# Patient Record
Sex: FEMALE | Race: ASIAN | NOT HISPANIC OR LATINO | Employment: FULL TIME | URBAN - METROPOLITAN AREA
[De-identification: names, ages, dates, MRNs, and addresses within clinical notes are randomized per-mention and may not be internally consistent; named-entity substitution may affect disease eponyms.]

---

## 2017-01-06 ENCOUNTER — PRENATAL OFFICE VISIT (OUTPATIENT)
Dept: OBGYN | Facility: CLINIC | Age: 32
End: 2017-01-06
Payer: COMMERCIAL

## 2017-01-06 ENCOUNTER — RADIANT APPOINTMENT (OUTPATIENT)
Dept: ULTRASOUND IMAGING | Facility: CLINIC | Age: 32
End: 2017-01-06
Attending: OBSTETRICS & GYNECOLOGY
Payer: COMMERCIAL

## 2017-01-06 VITALS
BODY MASS INDEX: 33.58 KG/M2 | RESPIRATION RATE: 16 BRPM | SYSTOLIC BLOOD PRESSURE: 101 MMHG | DIASTOLIC BLOOD PRESSURE: 61 MMHG | TEMPERATURE: 98.7 F | WEIGHT: 158 LBS | HEART RATE: 86 BPM

## 2017-01-06 DIAGNOSIS — O09.899 RUBELLA NON-IMMUNE STATUS, ANTEPARTUM: Primary | ICD-10-CM

## 2017-01-06 DIAGNOSIS — Z34.80 SUPERVISION OF OTHER NORMAL PREGNANCY, ANTEPARTUM: ICD-10-CM

## 2017-01-06 DIAGNOSIS — Z28.39 RUBELLA NON-IMMUNE STATUS, ANTEPARTUM: Primary | ICD-10-CM

## 2017-01-06 PROCEDURE — 76805 OB US >/= 14 WKS SNGL FETUS: CPT | Performed by: RADIOLOGY

## 2017-01-06 PROCEDURE — 99207 ZZC PRENATAL VISIT: CPT | Performed by: OBSTETRICS & GYNECOLOGY

## 2017-01-06 NOTE — NURSING NOTE
"Chief Complaint   Patient presents with     Prenatal Care     OBV 21w5d       Initial /61 mmHg  Pulse 86  Temp(Src) 98.7  F (37.1  C) (Oral)  Resp 16  Wt 158 lb (71.668 kg)  LMP 07/28/2016  Breastfeeding? No Estimated body mass index is 33.58 kg/(m^2) as calculated from the following:    Height as of 10/7/16: 4' 9.5\" (1.461 m).    Weight as of this encounter: 158 lb (71.668 kg).  BP completed using cuff size: regular Left arm  Catherinei MIKAL Watson      "

## 2017-01-06 NOTE — PATIENT INSTRUCTIONS
If you have any questions regarding your visit, Please contact your care team.     [facility] Access Services: [phone]    [facility] CLINIC HOURS TELEPHONE NUMBER   DEMOND Fontaine-    Bharti Sparks-TERESITA Rivero-Medical Assistant   Monday-[place]  8:00a.m-4:45 p.m  Wednesday-[place] 8:00a.m-4:45 p.m.  Thursday-[place]  8:00a.m-4:45 p.m.  Friday-[place]  8:00a.m-4:45 p.m. [facility]  [address]  [place], [place] [address] [phone] ask [facility]  [phone] Fax  Imaging [phone]    [facility] Labor and Delivery  [address] [place] [address] Dr.  [place], [place] [address] [phone]    [facility]  [address] [place] [address]  [phone] ask [facility]  [phone] Fax  Imaging [phone]     Urgent Care locations:    [place]        [place] Monday-Friday  5 pm - 9 pm  Saturday and Sunday   9 am - 5 pm    Monday-Friday   11 am - 9 pm  Saturday and Sunday   9 am - 5 pm   [phone]       If you need a medication refill, please contact your pharmacy. Please allow 3 business days for your refill to be completed.  As always, Thank you for trusting us with your healthcare needs!

## 2017-01-06 NOTE — MR AVS SNAPSHOT
After Visit Summary   1/6/2017    Becky Hernandez    MRN: [id number]           Patient Information     Date Of Birth          1985        Visit Information        Provider Department      1/6/2017 2:00 PM Rasheed Matias MD [facility]        Today's Diagnoses     Rubella non-immune status, antepartum    -  1     Supervision of other normal pregnancy, antepartum           Care Instructions                                                        If you have any questions regarding your visit, Please contact your care team.     [facility] Access Services: [phone]    [facility] CLINIC HOURS TELEPHONE NUMBER   DEMOND Fontaine-    Bharti Sparks-RN    Catherinei-Medical Assistant   Monday-[place]  8:00a.m-4:45 p.m  Wednesday-[place] 8:00a.m-4:45 p.m.  Thursday-[place]  8:00a.m-4:45 p.m.  Friday-[place]  8:00a.m-4:45 p.m. [facility]  [address] [place] [address]  [phone] ask for [facility]  [phone] Fax  Imaging [phone]    [facility] Labor and Delivery  [address] [place] [address] [place] [address]  [phone]    [facility]  [address] [facility] [place] [address]  [phone] ask for [facility]  [phone] Fax  Imaging [phone]     Urgent Care locations:    [facility] Monday-Friday  5 pm - 9 pm  Saturday and Sunday   9 am - 5 pm    Monday-Friday   11 am - 9 pm  Saturday and Sunday   9 am - 5 pm   [phone]       If you need a medication refill, please contact your pharmacy. Please allow 3 business days for your refill to be completed.  As always, Thank you for trusting us with your healthcare needs!          Follow-ups after your visit        Who to contact     If you have questions or need follow up information about today's clinic visit or your schedule please contact [place]  [facility] directly at [phone].  Normal or non-critical lab and imaging results will be communicated to you by MyChart, letter or phone within 4 business days after the clinic has received the results. If you do not hear from us within 7 days, please contact the clinic through [employer] or phone. If you have a critical or abnormal lab result, we will notify you by phone as soon as possible.  Submit refill requests through [employer] or call your pharmacy and they will forward the refill request to us. Please allow 3 business days for your refill to be completed.          Additional Information About Your Visit        [employer] Information     [employer] lets you send messages to your doctor, view your test results, renew your prescriptions, schedule appointments and more. To sign up, go to [email or web address] . Click on \"Log in\" on the left side of the screen, which will take you to the Welcome page. Then click on \"Sign up Now\" on the right side of the page.     You will be asked to enter the access code listed below, as well as some personal information. Please follow the directions to create your username and password.     Your access code is: [email or web address]  Expires: 2017  2:10 PM     Your access code will  in 90 days. If you need help or a new code, please call your [place] clinic or [phone].        Care EveryWhere ID     This is your Care EveryWhere ID. This could be used by other organizations to access your [place] medical records  [id number]        Your Vitals Were     Pulse Temperature Respirations Last Period Breastfeeding?       86 98.7  F (37.1  C) (Oral) 16 2016 No        Blood Pressure from Last 3 Encounters:   17 101/61   16 111/72   16 109/74    Weight from Last 3 Encounters:   17 158 lb (71.668 kg)   16 153 lb (69.4 kg)   16 152 lb (68.947 kg)              Today, you had the following     No orders found for display       Primary " Care Provider Office Phone # Fax #    Hazel Mendoza [phone]       [address] [address]        Thank you!     Thank you for choosing [facility]  for your care. Our goal is always to provide you with excellent care. Hearing back from our patients is one way we can continue to improve our services. Please take a few minutes to complete the written survey that you may receive in the mail after your visit with us. Thank you!             Your Updated Medication List - Protect others around you: Learn how to safely use, store and throw away your medicines at www.disposemymeds.org.          This list is accurate as of: 1/6/17  2:10 PM.  Always use your most recent med list.                   Brand Name Dispense Instructions for use    prenatal multivitamin  plus iron 27-0.8 MG Tabs per tablet     90 tablet    Take 1 tablet by mouth daily

## 2017-01-07 NOTE — PROGRESS NOTES
Doing well. No signif signs, symptoms or concerns. Advice as per Anticipatory Guidance/Checklist update. Discussed condition, tests and care plan including RBA. Problem list updated. 1h GTT next.  Rasheed Matias MD

## 2017-02-03 ENCOUNTER — PRENATAL OFFICE VISIT (OUTPATIENT)
Dept: OBGYN | Facility: CLINIC | Age: 32
End: 2017-02-03
Payer: COMMERCIAL

## 2017-02-03 VITALS
WEIGHT: 160 LBS | DIASTOLIC BLOOD PRESSURE: 63 MMHG | BODY MASS INDEX: 34 KG/M2 | HEART RATE: 94 BPM | SYSTOLIC BLOOD PRESSURE: 103 MMHG | TEMPERATURE: 98.2 F | RESPIRATION RATE: 16 BRPM

## 2017-02-03 DIAGNOSIS — Z34.80 SUPERVISION OF OTHER NORMAL PREGNANCY, ANTEPARTUM: Primary | ICD-10-CM

## 2017-02-03 DIAGNOSIS — Z28.39 RUBELLA NON-IMMUNE STATUS, ANTEPARTUM: ICD-10-CM

## 2017-02-03 DIAGNOSIS — O09.899 RUBELLA NON-IMMUNE STATUS, ANTEPARTUM: ICD-10-CM

## 2017-02-03 LAB
GLUCOSE 1H P 50 G GLC PO SERPL-MCNC: 154 MG/DL (ref 60–129)
HGB BLD-MCNC: 11 G/DL (ref 11.7–15.7)

## 2017-02-03 PROCEDURE — 99207 ZZC PRENATAL VISIT: CPT | Performed by: OBSTETRICS & GYNECOLOGY

## 2017-02-03 PROCEDURE — 82950 GLUCOSE TEST: CPT | Performed by: OBSTETRICS & GYNECOLOGY

## 2017-02-03 PROCEDURE — 36415 COLL VENOUS BLD VENIPUNCTURE: CPT | Performed by: OBSTETRICS & GYNECOLOGY

## 2017-02-03 PROCEDURE — 00000218 ZZHCL STATISTIC OBHBG - HEMOGLOBIN: Performed by: OBSTETRICS & GYNECOLOGY

## 2017-02-03 NOTE — NURSING NOTE
"Chief Complaint   Patient presents with     Prenatal Care     OBV 25w5d       Initial /63 mmHg  Pulse 94  Temp(Src) 98.2  F (36.8  C) (Oral)  Resp 16  Wt 160 lb (72.576 kg)  LMP 07/28/2016  Breastfeeding? No Estimated body mass index is 34 kg/(m^2) as calculated from the following:    Height as of 10/7/16: 4' 9.5\" (1.461 m).    Weight as of this encounter: 160 lb (72.576 kg).  BP completed using cuff size: regular Left arm  Catherinei Walter, MIKAL      "

## 2017-02-03 NOTE — MR AVS SNAPSHOT
After Visit Summary   2/3/2017    Becky Hernandez    MRN: [id number]           Patient Information     Date Of Birth          1985        Visit Information        Provider Department      2/3/2017 10:15 AM Rasheed Matias MD [facility]        Today's Diagnoses     Rubella non-immune status, antepartum    -  1     Supervision of other normal pregnancy, antepartum           Care Instructions                                                        If you have any questions regarding your visit, Please contact your care team.     [facility] Access Services: [phone]    [facility] CLINIC HOURS TELEPHONE NUMBER   DEMOND Fontaine-    Bharti Sparks-RN    Catherinei-Medical Assistant   Monday-[place]  8:00a.m-4:45 p.m  Wednesday-[place] 8:00a.m-4:45 p.m.  Thursday-[place]  8:00a.m-4:45 p.m.  Friday-[place]  8:00a.m-4:45 p.m. [facility]  [address] [place] [address]  [phone] ask for [facility]  [phone] Fax  Imaging [phone]    [facility] Labor and Delivery  [address] [place] [address] [place] [address]  [phone]    [facility]  [address] [facility] [place] [address]  [phone] ask for [facility]  [phone] Fax  Imaging [phone]     Urgent Care locations:    [facility] Monday-Friday  5 pm - 9 pm  Saturday and Sunday   9 am - 5 pm    Monday-Friday   11 am - 9 pm  Saturday and Sunday   9 am - 5 pm   [phone]       If you need a medication refill, please contact your pharmacy. Please allow 3 business days for your refill to be completed.  As always, Thank you for trusting us with your healthcare needs!          Follow-ups after your visit        Who to contact     If you have questions or need follow up information about today's clinic visit or your schedule please contact [place]  [facility] directly at [phone].  Normal or non-critical lab and imaging results will be communicated to you by MyChart, letter or phone within 4 business days after the clinic has received the results. If you do not hear from us within 7 days, please contact the clinic through [employer] or phone. If you have a critical or abnormal lab result, we will notify you by phone as soon as possible.  Submit refill requests through [employer] or call your pharmacy and they will forward the refill request to us. Please allow 3 business days for your refill to be completed.          Additional Information About Your Visit        [facility] Information     [employer] lets you send messages to your doctor, view your test results, renew your prescriptions, schedule appointments and more. To sign up, go to [email or web address] . Click on \"Log in\" on the left side of the screen, which will take you to the Welcome page. Then click on \"Sign up Now\" on the right side of the page.     You will be asked to enter the access code listed below, as well as some personal information. Please follow the directions to create your username and password.     Your access code is: [email or web address]  Expires: 2017  2:10 PM     Your access code will  in 90 days. If you need help or a new code, please call your [place] clinic or [phone].        Care EveryWhere ID     This is your Care EveryWhere ID. This could be used by other organizations to access your [place] medical records  [id number]        Your Vitals Were     Pulse Temperature Respirations Last Period Breastfeeding?       94 98.2  F (36.8  C) (Oral) 16 2016 No        Blood Pressure from Last 3 Encounters:   17 103/63   17 101/61   16 111/72    Weight from Last 3 Encounters:   17 160 lb (72.576 kg)   17 158 lb (71.668 kg)   16 153 lb (69.4 kg)              Today, you had the following     No orders found for display       Primary " Care Provider Office Phone # Fax #    Hazel Mendoza [phone]       [address] [address]        Thank you!     Thank you for choosing [facility]  for your care. Our goal is always to provide you with excellent care. Hearing back from our patients is one way we can continue to improve our services. Please take a few minutes to complete the written survey that you may receive in the mail after your visit with us. Thank you!             Your Updated Medication List - Protect others around you: Learn how to safely use, store and throw away your medicines at www.disposemymeds.org.          This list is accurate as of: 2/3/17 10:23 AM.  Always use your most recent med list.                   Brand Name Dispense Instructions for use    prenatal multivitamin  plus iron 27-0.8 MG Tabs per tablet     90 tablet    Take 1 tablet by mouth daily

## 2017-02-03 NOTE — PROGRESS NOTES
Doing well. No signif signs, symptoms or concerns. Advice as per Checklist update. Discussed condition, tests and care plan including RBA. Problem list updated.   Rasheed Matias MD

## 2017-02-18 DIAGNOSIS — Z34.80 SUPERVISION OF OTHER NORMAL PREGNANCY, ANTEPARTUM: ICD-10-CM

## 2017-02-18 PROCEDURE — 82952 GTT-ADDED SAMPLES: CPT | Performed by: OBSTETRICS & GYNECOLOGY

## 2017-02-18 PROCEDURE — 36415 COLL VENOUS BLD VENIPUNCTURE: CPT | Performed by: OBSTETRICS & GYNECOLOGY

## 2017-02-18 PROCEDURE — 82951 GLUCOSE TOLERANCE TEST (GTT): CPT | Performed by: OBSTETRICS & GYNECOLOGY

## 2017-02-20 LAB
GLUCOSE 1H P 100 G GLC PO SERPL-MCNC: 159 MG/DL (ref 60–179)
GLUCOSE 2H P 100 G GLC PO SERPL-MCNC: 145 MG/DL (ref 60–154)
GLUCOSE 3H P 100 G GLC PO SERPL-MCNC: 148 MG/DL (ref 60–139)
GLUCOSE P FAST SERPL-MCNC: 76 MG/DL (ref 60–94)

## 2017-03-01 ENCOUNTER — PRENATAL OFFICE VISIT (OUTPATIENT)
Dept: OBGYN | Facility: CLINIC | Age: 32
End: 2017-03-01
Payer: COMMERCIAL

## 2017-03-01 VITALS
TEMPERATURE: 97.8 F | HEART RATE: 94 BPM | DIASTOLIC BLOOD PRESSURE: 67 MMHG | WEIGHT: 165 LBS | BODY MASS INDEX: 35.09 KG/M2 | SYSTOLIC BLOOD PRESSURE: 106 MMHG

## 2017-03-01 DIAGNOSIS — Z28.39 RUBELLA NON-IMMUNE STATUS, ANTEPARTUM: ICD-10-CM

## 2017-03-01 DIAGNOSIS — Z34.80 SUPERVISION OF OTHER NORMAL PREGNANCY, ANTEPARTUM: ICD-10-CM

## 2017-03-01 DIAGNOSIS — O09.899 RUBELLA NON-IMMUNE STATUS, ANTEPARTUM: ICD-10-CM

## 2017-03-01 PROCEDURE — 99207 ZZC PRENATAL VISIT: CPT | Performed by: OBSTETRICS & GYNECOLOGY

## 2017-03-01 NOTE — NURSING NOTE
"Chief Complaint   Patient presents with     Prenatal Care     OBV 29w3d       Initial /67 (BP Location: Left arm, Patient Position: Chair, Cuff Size: Adult Regular)  Pulse 94  Temp 97.8  F (36.6  C) (Oral)  Wt 165 lb (74.8 kg)  LMP 07/28/2016  Breastfeeding? No  BMI 35.09 kg/m2 Estimated body mass index is 35.09 kg/(m^2) as calculated from the following:    Height as of 10/7/16: 4' 9.5\" (1.461 m).    Weight as of this encounter: 165 lb (74.8 kg).  Medication Reconciliation: complete   Josefa Watson CMA      "

## 2017-03-01 NOTE — MR AVS SNAPSHOT
After Visit Summary   3/1/2017    Becky Hernandez    MRN: [id number]           Patient Information     Date Of Birth          1985        Visit Information        Provider Department      3/1/2017 4:45 PM Rasheed Matias MD [facility]        Today's Diagnoses     Rubella non-immune status, antepartum        Supervision of other normal pregnancy, antepartum          Care Instructions                                                        If you have any questions regarding your visit, Please contact your care team.     [facility] Access Services: [phone]    [facility] CLINIC HOURS TELEPHONE NUMBER   Rasheed Matias M.D.      [place]-    Bharti Sparks-RN    Catherinei-Medical Assistant   Monday-[place]  8:00a.m-4:45 p.m  Wednesday-[place] 8:00a.m-4:45 p.m.  Thursday-[place]  8:00a.m-4:45 p.m.  Friday-[place]  8:00a.m-4:45 p.m. [facility]  [address] [place] [address]  [phone] ask [facility]  [phone] Fax  Imaging [phone]    [facility] Labor and Delivery  [address] [place] [address] [place] [address]  [phone]    [facility]  [address] [place] [address]  [phone] ask [facility]  [phone] Fax  Imaging [phone]     Urgent Care locations:    [facility] Monday-Friday  5 pm - 9 pm  Saturday and Sunday   9 am - 5 pm    Monday-Friday   11 am - 9 pm  Saturday and Sunday   9 am - 5 pm   [phone]       If you need a medication refill, please contact your pharmacy. Please allow 3 business days for your refill to be completed.  As always, Thank you for trusting us with your healthcare needs!          Follow-ups after your visit        Your next 10 appointments already scheduled     Mar 01, 2017  4:45 PM CST   ESTABLISHED PRENATAL with Rasheed Matias MD   [facility]  "Anastasiya Valadez ([facility])    [address] [place] [phone]              Who to contact     If you have questions or need follow up information about today's clinic visit or your schedule please contact [facility] directly at [phone].  Normal or non-critical lab and imaging results will be communicated to you by MyChart, letter or phone within 4 business days after the clinic has received the results. If you do not hear from us within 7 days, please contact the clinic through MyChart or phone. If you have a critical or abnormal lab result, we will notify you by phone as soon as possible.  Submit refill requests through [employer] or call your pharmacy and they will forward the refill request to us. Please allow 3 business days for your refill to be completed.          Additional Information About Your Visit        [employer] Information     [employer] lets you send messages to your doctor, view your test results, renew your prescriptions, schedule appointments and more. To sign up, go to [email or web address] . Click on \"Log in\" on the left side of the screen, which will take you to the Welcome page. Then click on \"Sign up Now\" on the right side of the page.     You will be asked to enter the access code listed below, as well as some personal information. Please follow the directions to create your username and password.     Your access code is: [email or web address]  Expires: 2017  2:10 PM     Your access code will  in 90 days. If you need help or a new code, please call your [facility] or [phone].        Care EveryWhere ID     This is your Care EveryWhere ID. This could be used by other organizations to access your [place] medical records  [id number]        Your Vitals Were     Pulse Temperature Last Period Breastfeeding? BMI (Body Mass Index)       94 97.8  F (36.6  C) (Oral) 2016 No 35.09 kg/m2        Blood Pressure " from Last 3 Encounters:   03/01/17 106/67   02/03/17 103/63   01/06/17 101/61    Weight from Last 3 Encounters:   03/01/17 165 lb (74.8 kg)   02/03/17 160 lb (72.6 kg)   01/06/17 158 lb (71.7 kg)              Today, you had the following     No orders found for display       Primary Care Provider Office Phone # Fax #    Hazel Mendoza [phone]       [address] [address]        Thank you!     Thank you for choosing [facility]  for your care. Our goal is always to provide you with excellent care. Hearing back from our patients is one way we can continue to improve our services. Please take a few minutes to complete the written survey that you may receive in the mail after your visit with us. Thank you!             Your Updated Medication List - Protect others around you: Learn how to safely use, store and throw away your medicines at www.disposemymeds.org.          This list is accurate as of: 3/1/17  4:33 PM.  Always use your most recent med list.                   Brand Name Dispense Instructions for use    prenatal multivitamin  plus iron 27-0.8 MG Tabs per tablet     90 tablet    Take 1 tablet by mouth daily

## 2017-03-01 NOTE — PATIENT INSTRUCTIONS
If you have any questions regarding your visit, Please contact your care team.     [facility] Access Services: [phone]    [facility] CLINIC HOURS TELEPHONE NUMBER   DEMOND Fontaine-    Bharti Sparks-TERESITA Rivero-Medical Assistant   Monday-[place]  8:00a.m-4:45 p.m  Wednesday-[place] 8:00a.m-4:45 p.m.  Thursday-[place]  8:00a.m-4:45 p.m.  Friday-[place]  8:00a.m-4:45 p.m. [facility]  [address]  [place], [place] [address] [phone] ask [facility]  [phone] Fax  Imaging [phone]    [facility] Labor and Delivery  [address] [place] [address] Dr.  [place], [place] [address] [phone]    [facility]  [address] [place] [address]  [phone] ask [facility]  [phone] Fax  Imaging [phone]     Urgent Care locations:    [place]        [place] Monday-Friday  5 pm - 9 pm  Saturday and Sunday   9 am - 5 pm    Monday-Friday   11 am - 9 pm  Saturday and Sunday   9 am - 5 pm   [phone]       If you need a medication refill, please contact your pharmacy. Please allow 3 business days for your refill to be completed.  As always, Thank you for trusting us with your healthcare needs!

## 2017-03-22 ENCOUNTER — PRENATAL OFFICE VISIT (OUTPATIENT)
Dept: OBGYN | Facility: CLINIC | Age: 32
End: 2017-03-22
Payer: COMMERCIAL

## 2017-03-22 VITALS
DIASTOLIC BLOOD PRESSURE: 68 MMHG | TEMPERATURE: 97.8 F | WEIGHT: 165 LBS | SYSTOLIC BLOOD PRESSURE: 100 MMHG | HEART RATE: 100 BPM | BODY MASS INDEX: 35.09 KG/M2

## 2017-03-22 DIAGNOSIS — Z34.80 SUPERVISION OF OTHER NORMAL PREGNANCY, ANTEPARTUM: ICD-10-CM

## 2017-03-22 DIAGNOSIS — O09.899 RUBELLA NON-IMMUNE STATUS, ANTEPARTUM: ICD-10-CM

## 2017-03-22 DIAGNOSIS — Z28.39 RUBELLA NON-IMMUNE STATUS, ANTEPARTUM: ICD-10-CM

## 2017-03-22 PROCEDURE — 99207 ZZC PRENATAL VISIT: CPT | Performed by: OBSTETRICS & GYNECOLOGY

## 2017-03-22 NOTE — PATIENT INSTRUCTIONS
If you have any questions regarding your visit, Please contact your care team.     [facility] Access Services: [phone]    [facility] CLINIC HOURS TELEPHONE NUMBER   DEMOND Fontaine-    Bharti Sparks-TERESITA Rivero-Medical Assistant   Monday-[place]  8:00a.m-4:45 p.m  Wednesday-[place] 8:00a.m-4:45 p.m.  Thursday-[place]  8:00a.m-4:45 p.m.  Friday-[place]  8:00a.m-4:45 p.m. [facility]  [address]  [place], [place] [address] [phone] ask [facility]  [phone] Fax  Imaging [id number]-[phone]    [facility] Labor and Delivery  [address] [place] [address] Dr.  [place], [place] [address] [phone]    [facility]  [address] [place] [address]  [phone] ask [facility]  [phone] Fax  Imaging [phone]     Urgent Care locations:    [place]        [place] Monday-Friday  5 pm - 9 pm  Saturday and Sunday   9 am - 5 pm    Monday-Friday   11 am - 9 pm  Saturday and Sunday   9 am - 5 pm   [phone]       If you need a medication refill, please contact your pharmacy. Please allow 3 business days for your refill to be completed.  As always, Thank you for trusting us with your healthcare needs!

## 2017-03-22 NOTE — NURSING NOTE
"Chief Complaint   Patient presents with     Prenatal Care     OBV 32w3d       Initial /68 (BP Location: Left arm, Patient Position: Chair, Cuff Size: Adult Regular)  Pulse 100  Temp 97.8  F (36.6  C) (Oral)  Wt 165 lb (74.8 kg)  LMP 07/28/2016  Breastfeeding? No  BMI 35.09 kg/m2 Estimated body mass index is 35.09 kg/(m^2) as calculated from the following:    Height as of 10/7/16: 4' 9.5\" (1.461 m).    Weight as of this encounter: 165 lb (74.8 kg).  Medication Reconciliation: complete   Josefa Watson CMA      "

## 2017-03-22 NOTE — MR AVS SNAPSHOT
After Visit Summary   3/22/2017    Becky Hernandez    MRN: [id number]           Patient Information     Date Of Birth          1985        Visit Information        Provider Department      3/22/2017 3:30 PM Rasheed Matias MD [facility]        Today's Diagnoses     Rubella non-immune status, antepartum        Supervision of other normal pregnancy, antepartum          Care Instructions                                                        If you have any questions regarding your visit, Please contact your care team.     [facility] Access Services: [phone]    [facility] CLINIC HOURS TELEPHONE NUMBER   Rasheed Matias M.D.      Terese-    Bharti Sparks-TERESITA Alexanderi-Medical Assistant   Monday-[place]  8:00a.m-4:45 p.m  Wednesday-[place] 8:00a.m-4:45 p.m.  Thursday-[place]  8:00a.m-4:45 p.m.  Friday-[place]  8:00a.m-4:45 p.m. [facility]  [address] [place], [place] [address] [address]  [phone] ask for [facility]  [phone] Fax  Imaging [phone]    [facility] Labor and Delivery  [address] [place] [address] [place] [address]  [phone]    [facility]  [address] [place] [address]  [phone] ask for [facility]  [phone] Fax  Imaging [phone]     Urgent Care locations:    [facility] Monday-Friday  5 pm - 9 pm  Saturday and Sunday   9 am - 5 pm    Monday-Friday   11 am - 9 pm  Saturday and Sunday   9 am - 5 pm   [phone]       If you need a medication refill, please contact your pharmacy. Please allow 3 business days for your refill to be completed.  As always, Thank you for trusting us with your healthcare needs!          Follow-ups after your visit        Who to contact     If you have questions or need follow up information about today's clinic visit or your schedule please contact [facility]  "[facility] directly at [phone].  Normal or non-critical lab and imaging results will be communicated to you by Absolute [employer], letter or phone within 4 business days after the clinic has received the results. If you do not hear from us within 7 days, please contact the clinic through [employer] or phone. If you have a critical or abnormal lab result, we will notify you by phone as soon as possible.  Submit refill requests through Wize or call your pharmacy and they will forward the refill request to us. Please allow 3 business days for your refill to be completed.          Additional Information About Your Visit        [employer] Information     Wize lets you send messages to your doctor, view your test results, renew your prescriptions, schedule appointments and more. To sign up, go to [email or web address] . Click on \"Log in\" on the left side of the screen, which will take you to the Welcome page. Then click on \"Sign up Now\" on the right side of the page.     You will be asked to enter the access code listed below, as well as some personal information. Please follow the directions to create your username and password.     Your access code is: [email or web address]  Expires: 2017  3:10 PM     Your access code will  in 90 days. If you need help or a new code, please call your [place] clinic or [phone].        Care EveryWhere ID     This is your Care EveryWhere ID. This could be used by other organizations to access your [place] medical records  [id number]        Your Vitals Were     Pulse Temperature Last Period Breastfeeding? BMI (Body Mass Index)       100 97.8  F (36.6  C) (Oral) 2016 No 35.09 kg/m2        Blood Pressure from Last 3 Encounters:   17 100/68   17 106/67   17 103/63    Weight from Last 3 Encounters:   17 165 lb (74.8 kg)   17 165 lb (74.8 kg)   17 160 lb (72.6 kg)              Today, you had the following     No orders found for display       " Primary Care Provider Office Phone # Fax #    Hazel Mendoza [phone]       [address]  [facility] [address]        Thank you!     Thank you for choosing [facility]  for your care. Our goal is always to provide you with excellent care. Hearing back from our patients is one way we can continue to improve our services. Please take a few minutes to complete the written survey that you may receive in the mail after your visit with us. Thank you!             Your Updated Medication List - Protect others around you: Learn how to safely use, store and throw away your medicines at www.disposemymeds.org.          This list is accurate as of: 3/22/17  3:48 PM.  Always use your most recent med list.                   Brand Name Dispense Instructions for use    prenatal multivitamin  plus iron 27-0.8 MG Tabs per tablet     90 tablet    Take 1 tablet by mouth daily

## 2017-03-23 NOTE — PROGRESS NOTES
Doing ok. No signif signs, symptoms or concerns except mild URI. Tdap declined. Advice as per Checklist update. Discussed condition, tests and care plan including RBA. Problem list updated.   Rasheed Matias MD

## 2017-04-06 ENCOUNTER — PRENATAL OFFICE VISIT (OUTPATIENT)
Dept: OBGYN | Facility: CLINIC | Age: 32
End: 2017-04-06
Payer: COMMERCIAL

## 2017-04-06 VITALS
WEIGHT: 168 LBS | DIASTOLIC BLOOD PRESSURE: 68 MMHG | TEMPERATURE: 98.6 F | HEART RATE: 101 BPM | BODY MASS INDEX: 35.73 KG/M2 | SYSTOLIC BLOOD PRESSURE: 108 MMHG

## 2017-04-06 DIAGNOSIS — Z28.39 RUBELLA NON-IMMUNE STATUS, ANTEPARTUM: ICD-10-CM

## 2017-04-06 DIAGNOSIS — Z34.81 ENCOUNTER FOR SUPERVISION OF OTHER NORMAL PREGNANCY IN FIRST TRIMESTER: ICD-10-CM

## 2017-04-06 DIAGNOSIS — O09.899 RUBELLA NON-IMMUNE STATUS, ANTEPARTUM: ICD-10-CM

## 2017-04-06 DIAGNOSIS — Z34.80 SUPERVISION OF OTHER NORMAL PREGNANCY, ANTEPARTUM: ICD-10-CM

## 2017-04-06 PROCEDURE — 99207 ZZC PRENATAL VISIT: CPT | Performed by: OBSTETRICS & GYNECOLOGY

## 2017-04-06 RX ORDER — PRENATAL VIT/IRON FUM/FOLIC AC 27MG-0.8MG
1 TABLET ORAL DAILY
Qty: 90 TABLET | Refills: 3 | Status: SHIPPED | OUTPATIENT
Start: 2017-04-06 | End: 2018-11-30

## 2017-04-06 NOTE — PATIENT INSTRUCTIONS
If you have any questions regarding your visit, Please contact your care team.     [facility] Access Services: [phone]    [facility] CLINIC HOURS TELEPHONE NUMBER   DEMOND Fontaine-    Bharti Sparks-TERESITA Rivero-Medical Assistant   Monday-[place]  8:00a.m-4:45 p.m  Wednesday-[place] 8:00a.m-4:45 p.m.  Thursday-[place]  8:00a.m-4:45 p.m.  Friday-[place]  8:00a.m-4:45 p.m. [facility]  [address]  [place], [place] [address] [phone] ask [facility]  [phone] Fax  Imaging [id number]-[phone]    [facility] Labor and Delivery  [address] [place] [address] Dr.  [place], [place] [address] [phone]    [facility]  [address] [place] [address]  [phone] ask [facility]  [phone] Fax  Imaging [phone]     Urgent Care locations:    [place]        [place] Monday-Friday  5 pm - 9 pm  Saturday and Sunday   9 am - 5 pm    Monday-Friday   11 am - 9 pm  Saturday and Sunday   9 am - 5 pm   [phone]       If you need a medication refill, please contact your pharmacy. Please allow 3 business days for your refill to be completed.  As always, Thank you for trusting us with your healthcare needs!

## 2017-04-06 NOTE — NURSING NOTE
"Chief Complaint   Patient presents with     Prenatal Care     OBV 34w4d       Initial /68 (BP Location: Left arm, Patient Position: Chair, Cuff Size: Adult Regular)  Pulse 101  Temp 98.6  F (37  C) (Oral)  Wt 168 lb (76.2 kg)  LMP 07/28/2016  Breastfeeding? No  BMI 35.73 kg/m2 Estimated body mass index is 35.73 kg/(m^2) as calculated from the following:    Height as of 10/7/16: 4' 9.5\" (1.461 m).    Weight as of this encounter: 168 lb (76.2 kg).  Medication Reconciliation: complete   Josefa Watson CMA      "

## 2017-04-06 NOTE — MR AVS SNAPSHOT
After Visit Summary   4/6/2017    Becky Hernandez    MRN: [id number]           Patient Information     Date Of Birth          1985        Visit Information        Provider Department      4/6/2017 3:00 PM Rasheed Matias MD [facility]        Today's Diagnoses     Rubella non-immune status, antepartum        Supervision of other normal pregnancy, antepartum          Care Instructions                                                        If you have any questions regarding your visit, Please contact your care team.     [facility] Access Services: [phone]    [facility] CLINIC HOURS TELEPHONE NUMBER   Rasheed Matias M.D.      Terese-    Bharti Sparks-TERESITA Alexanderi-Medical Assistant   Monday-[place]  8:00a.m-4:45 p.m  Wednesday-[place] 8:00a.m-4:45 p.m.  Thursday-[place]  8:00a.m-4:45 p.m.  Friday-[place]  8:00a.m-4:45 p.m. [facility]  [address] [place] [address] [address]  [phone] ask for [facility]  [phone] Fax  Imaging [phone]    [facility] Labor and Delivery  [address] [place] [address] [place] [address]  [phone]    [facility]  [address] [place] [address]  [phone] ask for [facility]  [phone] Fax  Imaging [phone]     Urgent Care locations:    [facility] Monday-Friday  5 pm - 9 pm  Saturday and Sunday   9 am - 5 pm    Monday-Friday   11 am - 9 pm  Saturday and Sunday   9 am - 5 pm   [phone]       If you need a medication refill, please contact your pharmacy. Please allow 3 business days for your refill to be completed.  As always, Thank you for trusting us with your healthcare needs!          Follow-ups after your visit        Who to contact     If you have questions or need follow up information about today's clinic visit or your schedule please contact [facility]  "IG[facility]ICOLE directly at [phone].  Normal or non-critical lab and imaging results will be communicated to you by BugSensehart, letter or phone within 4 business days after the clinic has received the results. If you do not hear from us within 7 days, please contact the clinic through BugSensehart or phone. If you have a critical or abnormal lab result, we will notify you by phone as soon as possible.  Submit refill requests through Ongage or call your pharmacy and they will forward the refill request to us. Please allow 3 business days for your refill to be completed.          Additional Information About Your Visit        BugSenseharContix Information     Ongage lets you send messages to your doctor, view your test results, renew your prescriptions, schedule appointments and more. To sign up, go to [email or web address] . Click on \"Log in\" on the left side of the screen, which will take you to the Welcome page. Then click on \"Sign up Now\" on the right side of the page.     You will be asked to enter the access code listed below, as well as some personal information. Please follow the directions to create your username and password.     Your access code is: [email or web address]  Expires: 2017  3:23 PM     Your access code will  in 90 days. If you need help or a new code, please call your [place] clinic or [phone].        Care EveryWhere ID     This is your Care EveryWhere ID. This could be used by other organizations to access your [place] medical records  [id number]        Your Vitals Were     Pulse Temperature Last Period Breastfeeding? BMI (Body Mass Index)       101 98.6  F (37  C) (Oral) 2016 No 35.73 kg/m2        Blood Pressure from Last 3 Encounters:   17 108/68   17 100/68   17 106/67    Weight from Last 3 Encounters:   17 168 lb (76.2 kg)   17 165 lb (74.8 kg)   17 165 lb (74.8 kg)              Today, you had the following     No orders found for display       " Primary Care Provider Office Phone # Fax #    Hazel Mendoza [phone]       [address]  [facility] [address]        Thank you!     Thank you for choosing [facility]  for your care. Our goal is always to provide you with excellent care. Hearing back from our patients is one way we can continue to improve our services. Please take a few minutes to complete the written survey that you may receive in the mail after your visit with us. Thank you!             Your Updated Medication List - Protect others around you: Learn how to safely use, store and throw away your medicines at www.disposemymeds.org.          This list is accurate as of: 4/6/17  3:23 PM.  Always use your most recent med list.                   Brand Name Dispense Instructions for use    prenatal multivitamin  plus iron 27-0.8 MG Tabs per tablet     90 tablet    Take 1 tablet by mouth daily

## 2017-04-07 NOTE — PROGRESS NOTES
Doing ok. No signif signs, symptoms or concerns except pelvic pressure and some discharge suggesting yeast- Monistat prn. Advice appropriate to gestational age reviewed including pertinent Checklist items. Discussed condition, tests and care plan including RBA. Problem list updated. GBS next.  Rasheed Matias MD

## 2017-04-20 ENCOUNTER — PRENATAL OFFICE VISIT (OUTPATIENT)
Dept: OBGYN | Facility: CLINIC | Age: 32
End: 2017-04-20
Payer: COMMERCIAL

## 2017-04-20 VITALS
TEMPERATURE: 98 F | DIASTOLIC BLOOD PRESSURE: 72 MMHG | WEIGHT: 171 LBS | HEART RATE: 90 BPM | SYSTOLIC BLOOD PRESSURE: 107 MMHG | BODY MASS INDEX: 36.36 KG/M2

## 2017-04-20 DIAGNOSIS — Z28.39 RUBELLA NON-IMMUNE STATUS, ANTEPARTUM: ICD-10-CM

## 2017-04-20 DIAGNOSIS — O09.899 RUBELLA NON-IMMUNE STATUS, ANTEPARTUM: ICD-10-CM

## 2017-04-20 DIAGNOSIS — Z34.80 SUPERVISION OF OTHER NORMAL PREGNANCY, ANTEPARTUM: Primary | ICD-10-CM

## 2017-04-20 PROCEDURE — 99207 ZZC PRENATAL VISIT: CPT | Performed by: OBSTETRICS & GYNECOLOGY

## 2017-04-20 PROCEDURE — 87653 STREP B DNA AMP PROBE: CPT | Performed by: OBSTETRICS & GYNECOLOGY

## 2017-04-20 NOTE — PATIENT INSTRUCTIONS
If you have any questions regarding your visit, Please contact your care team.     [facility] Access Services: [phone]    [facility] CLINIC HOURS TELEPHONE NUMBER   DEMOND Fontaine-    Bharti Sparks-TERESITA Rivero-Medical Assistant   Monday-[place]  8:00a.m-4:45 p.m  Wednesday-[place] 8:00a.m-4:45 p.m.  Thursday-[place]  8:00a.m-4:45 p.m.  Friday-[place]  8:00a.m-4:45 p.m. [facility]  [address]  [place], [place] [address] [phone] ask [facility]  [phone] Fax  Imaging [phone]    [facility] Labor and Delivery  [address] [place] [address] Dr.  [place], [place] [address] [phone]    [facility]  [address] [place] [address]  [phone] ask [facility]  [phone] Fax  Imaging [phone]     Urgent Care locations:    [place]        [place] Monday-Friday  5 pm - 9 pm  Saturday and Sunday   9 am - 5 pm    Monday-Friday   11 am - 9 pm  Saturday and Sunday   9 am - 5 pm   [phone]       If you need a medication refill, please contact your pharmacy. Please allow 3 business days for your refill to be completed.  As always, Thank you for trusting us with your healthcare needs!

## 2017-04-20 NOTE — MR AVS SNAPSHOT
After Visit Summary   4/20/2017    Becky Hernandez    MRN: [id number]           Patient Information     Date Of Birth          1985        Visit Information        Provider Department      4/20/2017 4:00 PM Rasheed Matias MD [facility]        Today's Diagnoses     Rubella non-immune status, antepartum        Supervision of other normal pregnancy, antepartum          Care Instructions                                                        If you have any questions regarding your visit, Please contact your care team.     [facility] Access Services: [phone]    [facility] CLINIC HOURS TELEPHONE NUMBER   Rasheed Matias M.D.      Terese-    Bharti Sparks-RN    Catherinei-Medical Assistant   Monday-[place]  8:00a.m-4:45 p.m  Wednesday-[place] 8:00a.m-4:45 p.m.  Thursday-[place]  8:00a.m-4:45 p.m.  Friday-[place]  8:00a.m-4:45 p.m. [facility]  [address] [place] [address]  [phone] ask [facility]  [phone] Fax  Imaging [id number]-[phone]    [facility] Labor and Delivery  [address] [place] [address] [place] [address]  [phone]    [facility]  [address] [place] [address]  [phone] ask [facility]  [phone] Fax  Imaging [phone]     Urgent Care locations:    [facility] Monday-Friday  5 pm - 9 pm  Saturday and Sunday   9 am - 5 pm    Monday-Friday   11 am - 9 pm  Saturday and Sunday   9 am - 5 pm   [phone]       If you need a medication refill, please contact your pharmacy. Please allow 3 business days for your refill to be completed.  As always, Thank you for trusting us with your healthcare needs!          Follow-ups after your visit        Your next 10 appointments already scheduled     Apr 26, 2017  4:00 PM CDT   ESTABLISHED PRENATAL with Rasheed Matias MD   [facility]  "[place] [facility]    [address] [facility] [address]   [phone]            May 03, 2017  3:30 PM CDT   ESTABLISHED PRENATAL with Rasheed Matias MD   [facility]    [address] [facility] [address]   [phone]            May 10, 2017  4:30 PM CDT   ESTABLISHED PRENATAL with Rasheed Matias MD   [facility]    [address] [facility] [address]   [phone]              Who to contact     If you have questions or need follow up information about today's clinic visit or your schedule please contact [facility] directly at [phone].  Normal or non-critical lab and imaging results will be communicated to you by MyChart, letter or phone within 4 business days after the clinic has received the results. If you do not hear from us within 7 days, please contact the clinic through [employer] or phone. If you have a critical or abnormal lab result, we will notify you by phone as soon as possible.  Submit refill requests through [employer] or call your pharmacy and they will forward the refill request to us. Please allow 3 business days for your refill to be completed.          Additional Information About Your Visit        [employer] Information     [employer] lets you send messages to your doctor, view your test results, renew your prescriptions, schedule appointments and more. To sign up, go to [email or web address] . Click on \"Log in\" on the left side of the screen, which will take you to the Welcome page. Then click on \"Sign up Now\" on the right side of the page.     You will be asked to enter the access code listed below, as well as some personal information. Please follow the directions to create your username and password.     Your access code is: [email or web address]  Expires: 7/5/2017  3:23 PM     Your access " code will  in 90 days. If you need help or a new code, please call your [place] clinic or [phone].        Care EveryWhere ID     This is your Care EveryWhere ID. This could be used by other organizations to access your [place] medical records  [id number]        Your Vitals Were     Pulse Temperature Last Period Breastfeeding? BMI (Body Mass Index)       90 98  F (36.7  C) (Oral) 2016 No 36.36 kg/m2        Blood Pressure from Last 3 Encounters:   17 107/72   17 108/68   17 100/68    Weight from Last 3 Encounters:   17 171 lb (77.6 kg)   17 168 lb (76.2 kg)   17 165 lb (74.8 kg)              Today, you had the following     No orders found for display       Primary Care Provider Office Phone # Fax #    Hazel Mendoza [phone]       [address] [address]        Thank you!     Thank you for choosing [facility]  for your care. Our goal is always to provide you with excellent care. Hearing back from our patients is one way we can continue to improve our services. Please take a few minutes to complete the written survey that you may receive in the mail after your visit with us. Thank you!             Your Updated Medication List - Protect others around you: Learn how to safely use, store and throw away your medicines at www.disposemymeds.org.          This list is accurate as of: 17  4:00 PM.  Always use your most recent med list.                   Brand Name Dispense Instructions for use    prenatal multivitamin  plus iron 27-0.8 MG Tabs per tablet     90 tablet    Take 1 tablet by mouth daily

## 2017-04-20 NOTE — NURSING NOTE
"Chief Complaint   Patient presents with     Prenatal Care     OBV 36w4d       Initial /72 (BP Location: Left arm, Patient Position: Chair, Cuff Size: Adult Regular)  Pulse 90  Temp 98  F (36.7  C) (Oral)  Wt 171 lb (77.6 kg)  LMP 07/28/2016  Breastfeeding? No  BMI 36.36 kg/m2 Estimated body mass index is 36.36 kg/(m^2) as calculated from the following:    Height as of 10/7/16: 4' 9.5\" (1.461 m).    Weight as of this encounter: 171 lb (77.6 kg).  Medication Reconciliation: complete   Josefa Watson CMA      "

## 2017-04-21 LAB
GP B STREP DNA SPEC QL NAA+PROBE: NORMAL
SPECIMEN SOURCE: NORMAL

## 2017-04-21 NOTE — PROGRESS NOTES
No signif signs, symptoms or concerns except tired. Limited OB u/s by me confirms Vtx and FHR. Advice appropriate to gestational age reviewed including pertinent Checklist items. Discussed condition, tests and care plan including RBA. Problem list updated.   A/P:  Becky Castrejon was seen today for prenatal care.    Diagnoses and all orders for this visit:    Supervision of other normal pregnancy, antepartum  -     Group B strep PCR    Rubella non-immune status, antepartum        Rasheed Matias MD

## 2017-04-26 ENCOUNTER — PRENATAL OFFICE VISIT (OUTPATIENT)
Dept: OBGYN | Facility: CLINIC | Age: 32
End: 2017-04-26
Payer: COMMERCIAL

## 2017-04-26 VITALS
DIASTOLIC BLOOD PRESSURE: 72 MMHG | SYSTOLIC BLOOD PRESSURE: 105 MMHG | HEART RATE: 91 BPM | WEIGHT: 169 LBS | BODY MASS INDEX: 35.94 KG/M2 | TEMPERATURE: 98.5 F

## 2017-04-26 DIAGNOSIS — Z34.80 SUPERVISION OF OTHER NORMAL PREGNANCY, ANTEPARTUM: Primary | ICD-10-CM

## 2017-04-26 DIAGNOSIS — O09.899 RUBELLA NON-IMMUNE STATUS, ANTEPARTUM: ICD-10-CM

## 2017-04-26 DIAGNOSIS — Z28.39 RUBELLA NON-IMMUNE STATUS, ANTEPARTUM: ICD-10-CM

## 2017-04-26 PROCEDURE — 99207 ZZC PRENATAL VISIT: CPT | Performed by: OBSTETRICS & GYNECOLOGY

## 2017-04-26 NOTE — MR AVS SNAPSHOT
After Visit Summary   4/26/2017    Becky Hernandez    MRN: [id number]           Patient Information     Date Of Birth          1985        Visit Information        Provider Department      4/26/2017 4:00 PM Rasheed Matias MD [facility]        Today's Diagnoses     Rubella non-immune status, antepartum        Supervision of other normal pregnancy, antepartum          Care Instructions                                                        If you have any questions regarding your visit, Please contact your care team.     [facility] Access Services: [phone]    [facility] CLINIC HOURS TELEPHONE NUMBER   Rasheed Matias M.D.      Terese-    Bharti Sparks-RN    Catherinei-Medical Assistant   Monday-[place]  8:00a.m-4:45 p.m  Wednesday-[place] 8:00a.m-4:45 p.m.  Thursday-[place]  8:00a.m-4:45 p.m.  Friday-[place]  8:00a.m-4:45 p.m. [facility]  [address] [place] [address]  [phone] ask [facility]  [phone] Fax  Imaging [email or web address]-[phone]    [facility] Labor and Delivery  [address] [place] [address] [place] [address]  [phone]    [facility]  [address] [place] [address]  [phone] ask [facility]  [phone] Fax  Imaging [phone]     Urgent Care locations:    [facility] Monday-Friday  5 pm - 9 pm  Saturday and Sunday   9 am - 5 pm    Monday-Friday   11 am - 9 pm  Saturday and Sunday   9 am - 5 pm   [phone]       If you need a medication refill, please contact your pharmacy. Please allow 3 business days for your refill to be completed.  As always, Thank you for trusting us with your healthcare needs!          Follow-ups after your visit        Your next 10 appointments already scheduled     May 03, 2017  3:30 PM CDT   ESTABLISHED PRENATAL with Rasheed Matias MD   [facility]  "Anastasiya Valadez ([facility])    [address] [facility] [address]   [phone]            May 10, 2017  4:30 PM CDT   ESTABLISHED PRENATAL with Rasheed Matias MD   [facility] ([facility])    [address] [facility] [address]   [phone]              Who to contact     If you have questions or need follow up information about today's clinic visit or your schedule please contact [facility] directly at [phone].  Normal or non-critical lab and imaging results will be communicated to you by MyChart, letter or phone within 4 business days after the clinic has received the results. If you do not hear from us within 7 days, please contact the clinic through MyChart or phone. If you have a critical or abnormal lab result, we will notify you by phone as soon as possible.  Submit refill requests through [employer] or call your pharmacy and they will forward the refill request to us. Please allow 3 business days for your refill to be completed.          Additional Information About Your Visit        FUNGO STUDI[employer]hart Information     [employer] lets you send messages to your doctor, view your test results, renew your prescriptions, schedule appointments and more. To sign up, go to [email or web address] . Click on \"Log in\" on the left side of the screen, which will take you to the Welcome page. Then click on \"Sign up Now\" on the right side of the page.     You will be asked to enter the access code listed below, as well as some personal information. Please follow the directions to create your username and password.     Your access code is: [email or web address]  Expires: 2017  3:23 PM     Your access code will  in 90 days. If you need help or a new code, please call your [place] clinic or [phone].        Care EveryWhere ID     This is your Care EveryWhere ID. This could be used by other organizations to " access your [place] medical records  [id number]        Your Vitals Were     Pulse Temperature Last Period Breastfeeding? BMI (Body Mass Index)       91 98.5  F (36.9  C) (Oral) 07/28/2016 No 35.94 kg/m2        Blood Pressure from Last 3 Encounters:   04/26/17 105/72   04/20/17 107/72   04/06/17 108/68    Weight from Last 3 Encounters:   04/26/17 169 lb (76.7 kg)   04/20/17 171 lb (77.6 kg)   04/06/17 168 lb (76.2 kg)              Today, you had the following     No orders found for display       Primary Care Provider Office Phone # Fax #    Hazel Mendoza -[phone]       [address] [address]        Thank you!     Thank you for choosing [facility]  for your care. Our goal is always to provide you with excellent care. Hearing back from our patients is one way we can continue to improve our services. Please take a few minutes to complete the written survey that you may receive in the mail after your visit with us. Thank you!             Your Updated Medication List - Protect others around you: Learn how to safely use, store and throw away your medicines at www.disposemymeds.org.          This list is accurate as of: 4/26/17  4:05 PM.  Always use your most recent med list.                   Brand Name Dispense Instructions for use    prenatal multivitamin  plus iron 27-0.8 MG Tabs per tablet     90 tablet    Take 1 tablet by mouth daily

## 2017-04-26 NOTE — NURSING NOTE
"Chief Complaint   Patient presents with     Prenatal Care     OBV 37w3d       Initial /72 (BP Location: Left arm, Patient Position: Chair, Cuff Size: Adult Regular)  Pulse 91  Temp 98.5  F (36.9  C) (Oral)  Wt 169 lb (76.7 kg)  LMP 07/28/2016  Breastfeeding? No  BMI 35.94 kg/m2 Estimated body mass index is 35.94 kg/(m^2) as calculated from the following:    Height as of 10/7/16: 4' 9.5\" (1.461 m).    Weight as of this encounter: 169 lb (76.7 kg).  Medication Reconciliation: complete   Josefa Watson CMA      "

## 2017-04-26 NOTE — PATIENT INSTRUCTIONS
If you have any questions regarding your visit, Please contact your care team.     [facility]: [phone]    [facility] CLINIC HOURS TELEPHONE NUMBER   DEMOND Fontaine-    Bharti Sparks-TERESITA Rivero-Medical Assistant   Monday-[place]  8:00a.m-4:45 p.m  Wednesday-[place] 8:00a.m-4:45 p.m.  Thursday-[place]  8:00a.m-4:45 p.m.  Friday-[place]  8:00a.m-4:45 p.m. [facility]  [address]  [place], [place] [address] [phone] ask [facility]  [phone] Fax  Imaging [phone]    [facility] Labor and Delivery  [address] [place] [address] Dr.  [place], [place] [address] [phone]    [facility]  [address] [place] [address]  [phone] ask [facility]  [phone] Fax  Imaging [phone]     Urgent Care locations:    [place]        [place] Monday-Friday  5 pm - 9 pm  Saturday and Sunday   9 am - 5 pm    Monday-Friday   11 am - 9 pm  Saturday and Sunday   9 am - 5 pm   [phone]       If you need a medication refill, please contact your pharmacy. Please allow 3 business days for your refill to be completed.  As always, Thank you for trusting us with your healthcare needs!

## 2017-04-27 NOTE — PROGRESS NOTES
No signif signs, symptoms or concerns. Advice appropriate to gestational age reviewed including pertinent Checklist items. Discussed condition, tests and care plan including RBA. Problem list updated. Check cervix next.  A/P:  Becky Castrejon was seen today for prenatal care.    Diagnoses and all orders for this visit:    Rubella non-immune status, antepartum    Supervision of other normal pregnancy, antepartum        Rasheed Matias MD

## 2017-05-02 ENCOUNTER — TELEPHONE (OUTPATIENT)
Dept: OBGYN | Facility: CLINIC | Age: 32
End: 2017-05-02

## 2017-05-02 DIAGNOSIS — Z28.39 RUBELLA NON-IMMUNE STATUS, ANTEPARTUM: ICD-10-CM

## 2017-05-02 DIAGNOSIS — Z34.80 SUPERVISION OF OTHER NORMAL PREGNANCY, ANTEPARTUM: ICD-10-CM

## 2017-05-02 DIAGNOSIS — O09.899 RUBELLA NON-IMMUNE STATUS, ANTEPARTUM: ICD-10-CM

## 2017-05-02 NOTE — TELEPHONE ENCOUNTER
Patient called stating she has been leaking fluids since yesterday morning.  She is wearing a thin pad and changed the pads 4-5 times yesterday, several times during the night and 2-3 times this morning.  Patient described the fluid as thin and clear.  She has some whitish vaginal discharge and believes she passed her mucus plug over the weekend.  Patient denied strong or painful contractions.  She does have occasional leonel zhang tightening.  Patient leaks the fluid mostly when she is standing or walking but did also leak while sleeping.  She denied vaginal bleeding and baby is active.  I advised patient to go to labor and delivery to check if she is leaking amniotic fluids/labor check.  Patient agreed to go.  She is scheduled tomorrow with Dr. Matias so I advised she keep this appointment as well.  Yamilex Cuevas RN

## 2017-05-03 ENCOUNTER — PRENATAL OFFICE VISIT (OUTPATIENT)
Dept: OBGYN | Facility: CLINIC | Age: 32
End: 2017-05-03
Payer: COMMERCIAL

## 2017-05-03 VITALS
TEMPERATURE: 97.6 F | HEART RATE: 93 BPM | WEIGHT: 169 LBS | BODY MASS INDEX: 35.94 KG/M2 | DIASTOLIC BLOOD PRESSURE: 74 MMHG | SYSTOLIC BLOOD PRESSURE: 107 MMHG

## 2017-05-03 DIAGNOSIS — O09.899 RUBELLA NON-IMMUNE STATUS, ANTEPARTUM: ICD-10-CM

## 2017-05-03 DIAGNOSIS — Z34.80 SUPERVISION OF OTHER NORMAL PREGNANCY, ANTEPARTUM: ICD-10-CM

## 2017-05-03 DIAGNOSIS — Z28.39 RUBELLA NON-IMMUNE STATUS, ANTEPARTUM: ICD-10-CM

## 2017-05-03 PROCEDURE — 99207 ZZC PRENATAL VISIT: CPT | Performed by: OBSTETRICS & GYNECOLOGY

## 2017-05-03 NOTE — MR AVS SNAPSHOT
After Visit Summary   5/3/2017    Becky Hernandez    MRN: [id number]           Patient Information     Date Of Birth          1985        Visit Information        Provider Department      5/3/2017 3:30 PM Rasheed Matias MD [facility]        Today's Diagnoses     Rubella non-immune status, antepartum        Supervision of other normal pregnancy, antepartum          Care Instructions                                                        If you have any questions regarding your visit, Please contact your care team.     [facility] Access Services: [phone]    [facility] CLINIC HOURS TELEPHONE NUMBER   Rasheed Matias M.D.      Terese-    Bharti Sparks-RN    Catherinei-Medical Assistant   Monday-[place]  8:00a.m-4:45 p.m  Wednesday-[place] 8:00a.m-4:45 p.m.  Thursday-[place]  8:00a.m-4:45 p.m.  Friday-[place]  8:00a.m-4:45 p.m. [facility]  [address] [place] [address]  [phone] ask [facility]  [phone] Fax  Imaging [phone]    [facility] Labor and Delivery  [address] [place] [address] [place] [address]  [phone]    [facility]  [address] [place] [address]  [phone] ask [facility]  [phone] Fax  Imaging [phone]     Urgent Care locations:    [facility] Monday-Friday  5 pm - 9 pm  Saturday and Sunday   9 am - 5 pm    Monday-Friday   11 am - 9 pm  Saturday and Sunday   9 am - 5 pm   [phone]       If you need a medication refill, please contact your pharmacy. Please allow 3 business days for your refill to be completed.  As always, Thank you for trusting us with your healthcare needs!          Follow-ups after your visit        Your next 10 appointments already scheduled     May 03, 2017  3:30 PM CDT   ESTABLISHED PRENATAL with Rasheed Matias MD   [facility]  "Anastasiya Valadez ([facility])    [address] [facility] [address]   [phone]            May 10, 2017  4:30 PM CDT   ESTABLISHED PRENATAL with Rasheed Matias MD   [facility]    [address] [facility] [address]   [phone]              Who to contact     If you have questions or need follow up information about today's clinic visit or your schedule please contact [facility] directly at [phone].  Normal or non-critical lab and imaging results will be communicated to you by MyChart, letter or phone within 4 business days after the clinic has received the results. If you do not hear from us within 7 days, please contact the clinic through MyChart or phone. If you have a critical or abnormal lab result, we will notify you by phone as soon as possible.  Submit refill requests through [employer] or call your pharmacy and they will forward the refill request to us. Please allow 3 business days for your refill to be completed.          Additional Information About Your Visit        [employer] Information     [employer] lets you send messages to your doctor, view your test results, renew your prescriptions, schedule appointments and more. To sign up, go to [email or web address] . Click on \"Log in\" on the left side of the screen, which will take you to the Welcome page. Then click on \"Sign up Now\" on the right side of the page.     You will be asked to enter the access code listed below, as well as some personal information. Please follow the directions to create your username and password.     Your access code is: [email or web address]  Expires: 2017  3:23 PM     Your access code will  in 90 days. If you need help or a new code, please call your [place] clinic or [phone].        Care EveryWhere ID     This is your Care EveryWhere ID. This could be used by other organizations to " access your [place] medical records  [id number]        Your Vitals Were     Pulse Temperature Last Period Breastfeeding? BMI (Body Mass Index)       93 97.6  F (36.4  C) (Oral) 07/28/2016 No 35.94 kg/m2        Blood Pressure from Last 3 Encounters:   05/03/17 107/74   04/26/17 105/72   04/20/17 107/72    Weight from Last 3 Encounters:   05/03/17 169 lb (76.7 kg)   04/26/17 169 lb (76.7 kg)   04/20/17 171 lb (77.6 kg)              Today, you had the following     No orders found for display       Primary Care Provider Office Phone # Fax #    Hazel Mendoza [phone]       [address] [address]        Thank you!     Thank you for choosing [facility]  for your care. Our goal is always to provide you with excellent care. Hearing back from our patients is one way we can continue to improve our services. Please take a few minutes to complete the written survey that you may receive in the mail after your visit with us. Thank you!             Your Updated Medication List - Protect others around you: Learn how to safely use, store and throw away your medicines at www.disposemymeds.org.          This list is accurate as of: 5/3/17  3:29 PM.  Always use your most recent med list.                   Brand Name Dispense Instructions for use    prenatal multivitamin  plus iron 27-0.8 MG Tabs per tablet     90 tablet    Take 1 tablet by mouth daily

## 2017-05-03 NOTE — NURSING NOTE
"Chief Complaint   Patient presents with     Prenatal Care     OBV 38w3d       Initial /74 (BP Location: Left arm, Patient Position: Chair, Cuff Size: Adult Regular)  Pulse 93  Temp 97.6  F (36.4  C) (Oral)  Wt 169 lb (76.7 kg)  LMP 07/28/2016  Breastfeeding? No  BMI 35.94 kg/m2 Estimated body mass index is 35.94 kg/(m^2) as calculated from the following:    Height as of 10/7/16: 4' 9.5\" (1.461 m).    Weight as of this encounter: 169 lb (76.7 kg).  Medication Reconciliation: complete   Josefa Watson CMA      "

## 2017-05-03 NOTE — PATIENT INSTRUCTIONS
If you have any questions regarding your visit, Please contact your care team.     [facility] Access [facility]: [phone]    [facility] CLINIC HOURS TELEPHONE NUMBER   DEMOND Fontaine-    Bharti Sparks-TERESITA Rivero-Medical Assistant   Monday-[place]  8:00a.m-4:45 p.m  Wednesday-[place] 8:00a.m-4:45 p.m.  Thursday-[place]  8:00a.m-4:45 p.m.  Friday-[place]  8:00a.m-4:45 p.m. [facility]  [address]  [place], [place] [address] [phone] ask [facility]  [phone] Fax  Imaging [phone]    [facility] Labor and Delivery  [address] [place] [address] Dr.  [place], [place] [address] [phone]    [facility]  [address] [place] [address]  [phone] ask [facility]  [phone] Fax  Imaging [phone]     Urgent Care locations:    [place]        [place] Monday-Friday  5 pm - 9 pm  Saturday and Sunday   9 am - 5 pm    Monday-Friday   11 am - 9 pm  Saturday and Sunday   9 am - 5 pm   [phone]       If you need a medication refill, please contact your pharmacy. Please allow 3 business days for your refill to be completed.  As always, Thank you for trusting us with your healthcare needs!

## 2017-05-04 NOTE — PROGRESS NOTES
No signif signs, symptoms or concerns except had Labor and Delivery eval yest for ?SROM and cervix was 3 cm. Plans Mirena IUD postpartum- discussed and information given. Advice appropriate to gestational age reviewed including pertinent Checklist items. Discussed condition, tests and care plan including RBA. Problem list updated.   A/P:  Becky Castrejon was seen today for prenatal care.    Diagnoses and all orders for this visit:    Rubella non-immune status, antepartum    Supervision of other normal pregnancy, antepartum        Rasheed Matias MD

## 2017-06-20 ENCOUNTER — PRENATAL OFFICE VISIT (OUTPATIENT)
Dept: OBGYN | Facility: CLINIC | Age: 32
End: 2017-06-20
Payer: COMMERCIAL

## 2017-06-20 VITALS
SYSTOLIC BLOOD PRESSURE: 116 MMHG | TEMPERATURE: 97 F | BODY MASS INDEX: 30.83 KG/M2 | DIASTOLIC BLOOD PRESSURE: 81 MMHG | HEART RATE: 76 BPM | WEIGHT: 145 LBS

## 2017-06-20 PROCEDURE — 99207 ZZC POST PARTUM EXAM: CPT | Performed by: OBSTETRICS & GYNECOLOGY

## 2017-06-20 NOTE — MR AVS SNAPSHOT
After Visit Summary   6/20/2017    Becky Hernandez    MRN: [id number]           Patient Information     Date Of Birth          1985        Visit Information        Provider Department      6/20/2017 11:15 AM Rasheed Matias MD [facility]        Care Instructions                                                        If you have any questions regarding your visit, Please contact your care team.     [facility] Access Services: [phone]    [facility] CLINIC HOURS TELEPHONE NUMBER   Rasheed Matias M.D.      Terese-    Bharti Sparks-TERESITA Rivero-Medical Assistant   Monday-[place]  8:00a.m-4:45 p.m  Wednesday-[place] 8:00a.m-4:45 p.m.  Thursday-[place]  8:00a.m-4:45 p.m.  Friday-[place]  8:00a.m-4:45 p.m. [facility]  [address] [place] [address] [place] [address]  [phone] ask for [facility]  [phone] Fax  Imaging [phone]    [facility] Labor and Delivery  [address] [place] [address] Dr.  [place], [place] [address] [phone]    [facility]  [address] [place] [address]  [phone] ask [facility]  [phone] Fax  Imaging [phone]     Urgent Care locations:    [facility] Monday-Friday  5 pm - 9 pm  Saturday and Sunday   9 am - 5 pm    Monday-Friday   11 am - 9 pm  Saturday and Sunday   9 am - 5 pm   [phone]       If you need a medication refill, please contact your pharmacy. Please allow 3 business days for your refill to be completed.  As always, Thank you for trusting us with your healthcare needs!            Follow-ups after your visit        Who to contact     If you have questions or need follow up information about today's clinic visit or your schedule please contact [facility] directly at [phone].  Normal or non-critical lab and imaging results will be communicated to you by  "MyChart, letter or phone within 4 business days after the clinic has received the results. If you do not hear from us within 7 days, please contact the clinic through [employer] or phone. If you have a critical or abnormal lab result, we will notify you by phone as soon as possible.  Submit refill requests through [employer] or call your pharmacy and they will forward the refill request to us. Please allow 3 business days for your refill to be completed.          Additional Information About Your Visit        [employer] Information     [employer] lets you send messages to your doctor, view your test results, renew your prescriptions, schedule appointments and more. To sign up, go to [email or web address] . Click on \"Log in\" on the left side of the screen, which will take you to the Welcome page. Then click on \"Sign up Now\" on the right side of the page.     You will be asked to enter the access code listed below, as well as some personal information. Please follow the directions to create your username and password.     Your access code is: [email or web address]  Expires: 2017  3:23 PM     Your access code will  in 90 days. If you need help or a new code, please call your [place] clinic or [phone].        Care EveryWhere ID     This is your Care EveryWhere ID. This could be used by other organizations to access your [place] medical records  [id number]        Your Vitals Were     Pulse Temperature Last Period Breastfeeding? BMI (Body Mass Index)       76 97  F (36.1  C) (Oral) 2016 No 30.83 kg/m2        Blood Pressure from Last 3 Encounters:   17 116/81   17 107/74   17 105/72    Weight from Last 3 Encounters:   17 145 lb (65.8 kg)   17 169 lb (76.7 kg)   17 169 lb (76.7 kg)              Today, you had the following     No orders found for display       Primary Care Provider Office Phone # Fax #    Hazel Mendoza [phone]       [facility] " [address] [facility]  [place] [address]        Thank you!     Thank you for choosing [facility]  for your care. Our goal is always to provide you with excellent care. Hearing back from our patients is one way we can continue to improve our services. Please take a few minutes to complete the written survey that you may receive in the mail after your visit with us. Thank you!             Your Updated Medication List - Protect others around you: Learn how to safely use, store and throw away your medicines at www.disposemymeds.org.          This list is accurate as of: 6/20/17 11:17 AM.  Always use your most recent med list.                   Brand Name Dispense Instructions for use    prenatal multivitamin  plus iron 27-0.8 MG Tabs per tablet     90 tablet    Take 1 tablet by mouth daily

## 2017-06-20 NOTE — NURSING NOTE
"Chief Complaint   Patient presents with     Post Partum Exam     DOD 5/5/17       Initial /81 (BP Location: Left arm, Patient Position: Chair, Cuff Size: Adult Regular)  Pulse 76  Temp 97  F (36.1  C) (Oral)  Wt 145 lb (65.8 kg)  LMP 07/28/2016  Breastfeeding? No  BMI 30.83 kg/m2 Estimated body mass index is 30.83 kg/(m^2) as calculated from the following:    Height as of 10/7/16: 4' 9.5\" (1.461 m).    Weight as of this encounter: 145 lb (65.8 kg).  Medication Reconciliation: complete   Josefa Watson CMA      "

## 2017-06-20 NOTE — PATIENT INSTRUCTIONS
If you have any questions regarding your visit, Please contact your care team.     [facility]: [phone]    [facility] CLINIC HOURS TELEPHONE NUMBER   DEMOND Fontaine-    Bharti Sparks-TERESITA Rivero-Medical Assistant   Monday-[place]  8:00a.m-4:45 p.m  Wednesday-[place] 8:00a.m-4:45 p.m.  Thursday-[place]  8:00a.m-4:45 p.m.  Friday-[place]  8:00a.m-4:45 p.m. [facility]  [address]  [place], [place] [address] [phone] ask [facility]  [phone] Fax  Imaging [id number]-[phone]    [facility] Labor and Delivery  [address] [place] [address] Dr.  [place], [place] [address] [phone]    [facility]  [address] [place] [address]  [phone] ask [facility]  [phone] Fax  Imaging [phone]     Urgent Care locations:    [place]        [place] Monday-Friday  5 pm - 9 pm  Saturday and Sunday   9 am - 5 pm    Monday-Friday   11 am - 9 pm  Saturday and Sunday   9 am - 5 pm   [phone]       If you need a medication refill, please contact your pharmacy. Please allow 3 business days for your refill to be completed.  As always, Thank you for trusting us with your healthcare needs!

## 2017-06-21 ASSESSMENT — PATIENT HEALTH QUESTIONNAIRE - PHQ9: SUM OF ALL RESPONSES TO PHQ QUESTIONS 1-9: 0

## 2017-06-24 NOTE — PROGRESS NOTES
Becky Hernandez is a 32 year old year old G 6 P 5 who is here today for a postpartum exam.    HPI:      Doing well and without signif sx or concerns. Currently bottle feeding infant. Here today alone. Infant doing well. Contraceptive method planned is Mirena IUD again. No dyspareunia since delivery and condoms used. PP depression screening as noted. See PHQ-9. Score = 0.    Past medical, obstetrical, surgical, family and social history reviewed and as noted or updated in chart.     Allergies, meds and supplements are as noted or updated in chart.      ROS:     Systems reviewed include constitutional; breast;                 cardiac; respiratory; gastrointestinal; genitourinary;                                musculoskeletal; integumentary; psychological;                                hematologic/lymphatic and endocrine.                  These systems were negative for significant symptoms except                 for the following: none and see HPI.                                Exam:  VS as noted.                    Abd was                             normal or negative except for, or in particular noting, the following                pertinent findings: none.       Assessment: Postpartum exam    Plan and Recommendations: Symptoms, problems and concerns reviewed.  Discussed pregnancy, birth, future pregnancy plans, work plans and infant care issues.  Problem list updated and Pregnancy Episode closed. See orders. RTC in 2-4 weeks for IUD insertion.  RTW 1 week planned.    Becky Castrejon was seen today for post partum exam.    Diagnoses and all orders for this visit:    Routine postpartum follow-up        Rasheed Matias MD

## 2017-07-19 ENCOUNTER — OFFICE VISIT (OUTPATIENT)
Dept: OBGYN | Facility: CLINIC | Age: 32
End: 2017-07-19
Payer: COMMERCIAL

## 2017-07-19 VITALS
HEIGHT: 58 IN | DIASTOLIC BLOOD PRESSURE: 79 MMHG | HEART RATE: 86 BPM | TEMPERATURE: 97.8 F | BODY MASS INDEX: 31.45 KG/M2 | SYSTOLIC BLOOD PRESSURE: 115 MMHG | WEIGHT: 149.8 LBS

## 2017-07-19 DIAGNOSIS — Z30.09 ENCOUNTER FOR GENERAL COUNSELING AND ADVICE ON CONTRACEPTIVE MANAGEMENT: Primary | ICD-10-CM

## 2017-07-19 PROCEDURE — 99212 OFFICE O/P EST SF 10 MIN: CPT | Performed by: OBSTETRICS & GYNECOLOGY

## 2017-07-19 ASSESSMENT — PAIN SCALES - GENERAL: PAINLEVEL: NO PAIN (0)

## 2017-07-19 NOTE — MR AVS SNAPSHOT
"              After Visit Summary   2017    Becky Hernandez    MRN: [id number]           Patient Information     Date Of Birth          1985        Visit Information        Provider Department      2017 4:00 PM Rasheed Matias MD [facility]         Follow-ups after your visit        Who to contact     If you have questions or need follow up information about today's clinic visit or your schedule please contact [facility] directly at [phone].  Normal or non-critical lab and imaging results will be communicated to you by MyChart, letter or phone within 4 business days after the clinic has received the results. If you do not hear from us within 7 days, please contact the clinic through [employer] or phone. If you have a critical or abnormal lab result, we will notify you by phone as soon as possible.  Submit refill requests through [employer] or call your pharmacy and they will forward the refill request to us. Please allow 3 business days for your refill to be completed.          Additional Information About Your Visit        MyChart Information     [employer] lets you send messages to your doctor, view your test results, renew your prescriptions, schedule appointments and more. To sign up, go to [email or web address] . Click on \"Log in\" on the left side of the screen, which will take you to the Welcome page. Then click on \"Sign up Now\" on the right side of the page.     You will be asked to enter the access code listed below, as well as some personal information. Please follow the directions to create your username and password.     Your access code is: [email or web address]  Expires: 10/17/2017  4:10 PM     Your access code will  in 90 days. If you need help or a new code, please call your [facility] or [phone].        Care EveryWhere ID     This is your Care EveryWhere ID. This could be used by other organizations to access your [place] medical " "records  [id number]        Your Vitals Were     Pulse Temperature Height Last Period Breastfeeding? BMI (Body Mass Index)    86 97.8  F (36.6  C) (Oral) 4' 10\" (1.473 m) 07/17/2017 (Approximate) No 31.31 kg/m2       Blood Pressure from Last 3 Encounters:   07/19/17 115/79   06/20/17 116/81   05/03/17 107/74    Weight from Last 3 Encounters:   07/19/17 149 lb 12.8 oz (67.9 kg)   06/20/17 145 lb (65.8 kg)   05/03/17 169 lb (76.7 kg)              Today, you had the following     No orders found for display       Primary Care Provider Office Phone # Fax #    Hazel Mendoza [phone]       [address] [address]        Equal Access to Services     [facility] ROMAN : Hadii aad ku hadasho Soomaali, waaxda luqadaha, qaybta kaalmada adeegyada, waxay idiin haylena scherer . So [facility] [phone].    ATENCIÓN: Si habla español, tiene a whittaker disposición servicios gratuitos de asistencia lingüística. Maynorame al [phone].    We comply with applicable federal civil rights laws and [place] laws. We do not discriminate on the basis of race, color, national origin, age, disability sex, sexual orientation or gender identity.            Thank you!     Thank you for choosing [facility]  for your care. Our goal is always to provide you with excellent care. Hearing back from our patients is one way we can continue to improve our services. Please take a few minutes to complete the written survey that you may receive in the mail after your visit with us. Thank you!             Your Updated Medication List - Protect others around you: Learn how to safely use, store and throw away your medicines at www.disposemymeds.org.          This list is accurate as of: 7/19/17  4:10 PM.  Always use your most recent med list.                   Brand Name Dispense Instructions for use Diagnosis    prenatal multivitamin  plus iron 27-0.8 MG Tabs per tablet     90 " tablet    Take 1 tablet by mouth daily    Encounter for supervision of other normal pregnancy in first trimester

## 2017-07-19 NOTE — PROGRESS NOTES
Becky Hernandez is a 32 year old year old who is here today for contraception advice.  See prior notes.     Significant interval changes: none.  No signif signs, symptoms or concerns.  Infant daughter is doing well.       Past medical, obstetrical, surgical, family and social history reviewed and as noted or updated in chart.      Exam: not repeated.    A/P:   Discussed contraceptive options. She was planning on another Mirena IUD and had this appt but has now decided to use Nexplanon instead. Discussed method, RBA, and she will schedule Nexplanon insertion in the near future with FP or Dr. Aquino.   Total encounter time= 10min. Direct counseling, education and care coordination time with the patient present= 10min.     Becky Castrejon was seen today for contraception.    Diagnoses and all orders for this visit:    Encounter for general counseling and advice on contraceptive management        Rasheed Matias MD

## 2017-07-19 NOTE — NURSING NOTE
"Chief Complaint   Patient presents with     Contraception       Initial /79  Pulse 86  Temp 97.8  F (36.6  C) (Oral)  Ht 4' 10\" (1.473 m)  Wt 149 lb 12.8 oz (67.9 kg)  LMP 07/17/2017 (Approximate)  Breastfeeding? No  BMI 31.31 kg/m2 Estimated body mass index is 31.31 kg/(m^2) as calculated from the following:    Height as of this encounter: 4' 10\" (1.473 m).    Weight as of this encounter: 149 lb 12.8 oz (67.9 kg)..  BP completed using cuff size: allison Sams CMA    "

## 2017-07-20 ENCOUNTER — OFFICE VISIT (OUTPATIENT)
Dept: OBGYN | Facility: CLINIC | Age: 32
End: 2017-07-20
Payer: COMMERCIAL

## 2017-07-20 VITALS
HEART RATE: 88 BPM | DIASTOLIC BLOOD PRESSURE: 72 MMHG | WEIGHT: 149 LBS | SYSTOLIC BLOOD PRESSURE: 108 MMHG | OXYGEN SATURATION: 97 % | BODY MASS INDEX: 31.14 KG/M2

## 2017-07-20 DIAGNOSIS — Z30.017 NEXPLANON INSERTION: Primary | ICD-10-CM

## 2017-07-20 LAB — BETA HCG QUAL IFA URINE: NORMAL

## 2017-07-20 PROCEDURE — 84703 CHORIONIC GONADOTROPIN ASSAY: CPT | Performed by: OBSTETRICS & GYNECOLOGY

## 2017-07-20 PROCEDURE — 11981 INSERTION DRUG DLVR IMPLANT: CPT | Performed by: OBSTETRICS & GYNECOLOGY

## 2017-07-20 NOTE — NURSING NOTE
"Chief Complaint   Patient presents with     Contraception     Wants Nexplanon       Initial /72 (BP Location: Left arm, Cuff Size: Adult Regular)  Pulse 88  Wt 149 lb (67.6 kg)  LMP 07/17/2017 (Approximate)  SpO2 97%  Breastfeeding? No  BMI 31.14 kg/m2 Estimated body mass index is 31.14 kg/(m^2) as calculated from the following:    Height as of 7/19/17: 4' 10\" (1.473 m).    Weight as of this encounter: 149 lb (67.6 kg).  Medication Reconciliation: complete   TERE Sales 7/20/2017         "

## 2017-07-20 NOTE — MR AVS SNAPSHOT
"              After Visit Summary   2017    Becky Hernandez    MRN: [id number]           Patient Information     Date Of Birth          1985        Visit Information        Provider Department      2017 8:00 AM Ulisses Edwards MD [facility]        Today's Diagnoses     Nexplanon insertion    -  1       Follow-ups after your visit        Follow-up notes from your care team     Return in about 1 year (around 2018) for Physical Exam.      Who to contact     If you have questions or need follow up information about today's clinic visit or your schedule please contact [facility] directly at [phone].  Normal or non-critical lab and imaging results will be communicated to you by MyChart, letter or phone within 4 business days after the clinic has received the results. If you do not hear from us within 7 days, please contact the clinic through MyChart or phone. If you have a critical or abnormal lab result, we will notify you by phone as soon as possible.  Submit refill requests through [employer] or call your pharmacy and they will forward the refill request to us. Please allow 3 business days for your refill to be completed.          Additional Information About Your Visit        MyChart Information     [employer] lets you send messages to your doctor, view your test results, renew your prescriptions, schedule appointments and more. To sign up, go to [email or web address] . Click on \"Log in\" on the left side of the screen, which will take you to the Welcome page. Then click on \"Sign up Now\" on the right side of the page.     You will be asked to enter the access code listed below, as well as some personal information. Please follow the directions to create your username and password.     Your access code is: [email or web address]  Expires: 10/17/2017  4:10 PM     Your access code will  in 90 days. If you need help or a new code, please call your [place] clinic " or [phone].        Care EveryWhere ID     This is your Care EveryWhere ID. This could be used by other organizations to access your [place] medical records  [id number]        Your Vitals Were     Pulse Last Period Pulse Oximetry Breastfeeding? BMI (Body Mass Index)       88 07/17/2017 (Approximate) 97% No 31.14 kg/m2        Blood Pressure from Last 3 Encounters:   07/20/17 108/72   07/19/17 115/79   06/20/17 116/81    Weight from Last 3 Encounters:   07/20/17 149 lb (67.6 kg)   07/19/17 149 lb 12.8 oz (67.9 kg)   06/20/17 145 lb (65.8 kg)              We Performed the Following     Beta HCG qual IFA urine     INSERTION NON-BIODEGRADABLE DRUG DELIVERY IMPLANT          Today's Medication Changes          These changes are accurate as of: 7/20/17 12:20 PM.  If you have any questions, ask your nurse or doctor.               Start taking these medicines.        Dose/Directions    etonogestrel 68 MG Impl   Commonly known as:  NEXPLANON   Used for:  Nexplanon insertion   Started by:  Ulisses Edwards MD        One device, subdermally, for up to 3 years.   Quantity:  1 each   Refills:  0            Where to get your medicines      Some of these will need a paper prescription and others can be bought over the counter.  Ask your nurse if you have questions.     You don't need a prescription for these medications     etonogestrel 68 MG Impl                Primary Care Provider Office Phone # Fax #    Hazel Mendoza [phone]       [place] [address]        Equal Access to Services     [facility]: Hadii aad ku hadasho Soomaali, waaxda luqadaha, qaybta kaalmada adeegyada, waxniko [facility] pollack. So [facility] [phone].    ATENCIÓN: Si habla español, tiene a whittaker disposición servicios gratuitos de asistencia lingüística. Llame al [phone].    We comply with applicable federal civil rights laws and [place] laws. We do not  discriminate on the basis of race, color, national origin, age, disability sex, sexual orientation or gender identity.            Thank you!     Thank you for choosing [facility]  for your care. Our goal is always to provide you with excellent care. Hearing back from our patients is one way we can continue to improve our services. Please take a few minutes to complete the written survey that you may receive in the mail after your visit with us. Thank you!             Your Updated Medication List - Protect others around you: Learn how to safely use, store and throw away your medicines at www.disposemymeds.org.          This list is accurate as of: 7/20/17 12:20 PM.  Always use your most recent med list.                   Brand Name Dispense Instructions for use Diagnosis    etonogestrel 68 MG Impl    NEXPLANON    1 each    One device, subdermally, for up to 3 years.    Nexplanon insertion       prenatal multivitamin  plus iron 27-0.8 MG Tabs per tablet     90 tablet    Take 1 tablet by mouth daily    Encounter for supervision of other normal pregnancy in first trimester

## 2017-07-20 NOTE — PROGRESS NOTES
PROCEDURE    Becky Hernandez is a 32 year old female  Patient's last menstrual period was 2017 (approximate).     We previously reviewed options regarding contraception, and again today reviewed the Nexplanon as a sub-dermal implant effectiveness for 3 years.     /72 (BP Location: Left arm, Cuff Size: Adult Regular)  Pulse 88  Wt 149 lb (67.6 kg)  LMP 2017 (Approximate)  SpO2 97%  Breastfeeding? No  BMI 31.14 kg/m2       We reviewed the mechanism of actions, goals and limitations of the use of the medication, as well as the contraindications.  Bleeding patterns were also reviewed with her. She voiced her understanding.  The patient and I reviewed Nexplanon removal, and should she choose to have the product removed, she needs to have someone trained for the insertion and removal.  Informed consent obtained.    Patient was placed in a supine position. Left arm was flexed at the elbow and externally rotated. Insertion site was noted at 6 cm above the elbow crease at the inner side of the upper arm overlying the grove between the biceps and the triceps. A second zohra was made 6 cm from the first to use as a guide. The area of the insertion site was prepped with Betadine. Lidocaine 1% was used along the planned insertion site. The Nexplanon was removed from its packaging.   The Nexplanon applicator was held just above the needle at the textured surface area and the transparent protection cap was removed from the needle. The implant could be seen by looking into the tip of the needle. The implant could be seen by looking into the tip of the needle.  The skin was stretched at the insertion site. The needle was inserted with beveled side up just underneath the skin. Tenting was undertaken while the insertion needle to its full length. The purple slider was unlocked. The slider was moved fully back until it stopped.  The applicator was removed from her arm.  After the insertion, the implant was  palpated by both myself and the patient. The betadine was removed from her arm. Benzoin and Steri Strips were applied.  Telfa was applied to site, along with pressure dressing and sterile gauze.  The patient was given aftercare instructions, her user card with the lot number. She tolerated the procedure well.  No apparent complications.      Lot#: [id number]  Expiration date:09/2019

## 2017-12-16 ENCOUNTER — HEALTH MAINTENANCE LETTER (OUTPATIENT)
Age: 32
End: 2017-12-16

## 2018-11-30 ENCOUNTER — OFFICE VISIT (OUTPATIENT)
Dept: FAMILY MEDICINE | Facility: CLINIC | Age: 33
End: 2018-11-30
Payer: COMMERCIAL

## 2018-11-30 VITALS
OXYGEN SATURATION: 95 % | BODY MASS INDEX: 33.8 KG/M2 | HEART RATE: 102 BPM | DIASTOLIC BLOOD PRESSURE: 74 MMHG | WEIGHT: 161 LBS | TEMPERATURE: 98.3 F | HEIGHT: 58 IN | SYSTOLIC BLOOD PRESSURE: 106 MMHG

## 2018-11-30 DIAGNOSIS — R53.83 FATIGUE, UNSPECIFIED TYPE: Primary | ICD-10-CM

## 2018-11-30 DIAGNOSIS — Z23 NEED FOR PROPHYLACTIC VACCINATION AND INOCULATION AGAINST INFLUENZA: ICD-10-CM

## 2018-11-30 DIAGNOSIS — Z13.1 SCREENING FOR DIABETES MELLITUS: ICD-10-CM

## 2018-11-30 DIAGNOSIS — Z12.4 SCREENING FOR MALIGNANT NEOPLASM OF CERVIX: ICD-10-CM

## 2018-11-30 LAB
ALBUMIN SERPL-MCNC: 4 G/DL (ref 3.4–5)
ALP SERPL-CCNC: 98 U/L (ref 40–150)
ALT SERPL W P-5'-P-CCNC: 46 U/L (ref 0–50)
ANION GAP SERPL CALCULATED.3IONS-SCNC: 9 MMOL/L (ref 3–14)
AST SERPL W P-5'-P-CCNC: 30 U/L (ref 0–45)
BASOPHILS # BLD AUTO: 0 10E9/L (ref 0–0.2)
BASOPHILS NFR BLD AUTO: 0.1 %
BILIRUB SERPL-MCNC: 0.3 MG/DL (ref 0.2–1.3)
BUN SERPL-MCNC: 14 MG/DL (ref 7–30)
CALCIUM SERPL-MCNC: 8.9 MG/DL (ref 8.5–10.1)
CHLORIDE SERPL-SCNC: 107 MMOL/L (ref 94–109)
CO2 SERPL-SCNC: 25 MMOL/L (ref 20–32)
CREAT SERPL-MCNC: 0.72 MG/DL (ref 0.52–1.04)
DIFFERENTIAL METHOD BLD: NORMAL
EOSINOPHIL # BLD AUTO: 0.2 10E9/L (ref 0–0.7)
EOSINOPHIL NFR BLD AUTO: 1.9 %
ERYTHROCYTE [DISTWIDTH] IN BLOOD BY AUTOMATED COUNT: 12.2 % (ref 10–15)
FERRITIN SERPL-MCNC: 87 NG/ML (ref 12–150)
GFR SERPL CREATININE-BSD FRML MDRD: >90 ML/MIN/1.7M2
GLUCOSE SERPL-MCNC: 208 MG/DL (ref 70–99)
HBA1C MFR BLD: 7.6 % (ref 0–5.6)
HCT VFR BLD AUTO: 40.6 % (ref 35–47)
HGB BLD-MCNC: 13.5 G/DL (ref 11.7–15.7)
LYMPHOCYTES # BLD AUTO: 2.5 10E9/L (ref 0.8–5.3)
LYMPHOCYTES NFR BLD AUTO: 31.7 %
MCH RBC QN AUTO: 30.3 PG (ref 26.5–33)
MCHC RBC AUTO-ENTMCNC: 33.3 G/DL (ref 31.5–36.5)
MCV RBC AUTO: 91 FL (ref 78–100)
MONOCYTES # BLD AUTO: 0.4 10E9/L (ref 0–1.3)
MONOCYTES NFR BLD AUTO: 5.1 %
NEUTROPHILS # BLD AUTO: 4.7 10E9/L (ref 1.6–8.3)
NEUTROPHILS NFR BLD AUTO: 61.2 %
PLATELET # BLD AUTO: 251 10E9/L (ref 150–450)
POTASSIUM SERPL-SCNC: 3.8 MMOL/L (ref 3.4–5.3)
PROT SERPL-MCNC: 7.7 G/DL (ref 6.8–8.8)
RBC # BLD AUTO: 4.46 10E12/L (ref 3.8–5.2)
SODIUM SERPL-SCNC: 141 MMOL/L (ref 133–144)
TSH SERPL DL<=0.005 MIU/L-ACNC: 1.06 MU/L (ref 0.4–4)
VIT B12 SERPL-MCNC: 304 PG/ML (ref 193–986)
WBC # BLD AUTO: 7.7 10E9/L (ref 4–11)

## 2018-11-30 PROCEDURE — 90686 IIV4 VACC NO PRSV 0.5 ML IM: CPT | Performed by: PREVENTIVE MEDICINE

## 2018-11-30 PROCEDURE — 85025 COMPLETE CBC W/AUTO DIFF WBC: CPT | Performed by: PREVENTIVE MEDICINE

## 2018-11-30 PROCEDURE — 84443 ASSAY THYROID STIM HORMONE: CPT | Performed by: PREVENTIVE MEDICINE

## 2018-11-30 PROCEDURE — 36415 COLL VENOUS BLD VENIPUNCTURE: CPT | Performed by: PREVENTIVE MEDICINE

## 2018-11-30 PROCEDURE — 82728 ASSAY OF FERRITIN: CPT | Performed by: PREVENTIVE MEDICINE

## 2018-11-30 PROCEDURE — 83036 HEMOGLOBIN GLYCOSYLATED A1C: CPT | Performed by: PREVENTIVE MEDICINE

## 2018-11-30 PROCEDURE — G0145 SCR C/V CYTO,THINLAYER,RESCR: HCPCS | Performed by: PREVENTIVE MEDICINE

## 2018-11-30 PROCEDURE — 82607 VITAMIN B-12: CPT | Performed by: PREVENTIVE MEDICINE

## 2018-11-30 PROCEDURE — 82306 VITAMIN D 25 HYDROXY: CPT | Performed by: PREVENTIVE MEDICINE

## 2018-11-30 PROCEDURE — 99214 OFFICE O/P EST MOD 30 MIN: CPT | Mod: 25 | Performed by: PREVENTIVE MEDICINE

## 2018-11-30 PROCEDURE — 80053 COMPREHEN METABOLIC PANEL: CPT | Performed by: PREVENTIVE MEDICINE

## 2018-11-30 PROCEDURE — 90471 IMMUNIZATION ADMIN: CPT | Performed by: PREVENTIVE MEDICINE

## 2018-11-30 PROCEDURE — 87624 HPV HI-RISK TYP POOLED RSLT: CPT | Performed by: PREVENTIVE MEDICINE

## 2018-11-30 ASSESSMENT — PAIN SCALES - GENERAL: PAINLEVEL: NO PAIN (0)

## 2018-11-30 NOTE — PATIENT INSTRUCTIONS
At [facility], we strive to deliver an exceptional experience to you, every time we see you.  If you receive a survey in the mail, please send us back your thoughts. We really do value your feedback.    Your care team:                            Family Medicine Internal Medicine   MD Juan Manuel Washington MD Shantel Branch-Fleming, MD Katya Georgiev PA-C Megan Hill, APRN CHICO Montenegro MD Pediatrics   Winston Monterroso, CARISA Brock, MD Bernarda Lomeli APRN CNP   MD Hanh Bartlett MD Deborah Mielke, MD Maryse Roper, APRN [facility]      Clinic hours: Monday - Thursday 7 am-7 pm; Fridays 7 am-5 pm.   Urgent care: Monday - Friday 11 am-9 pm; Saturday and Sunday 9 am-5 pm.  Pharmacy : Monday -Thursday 8 am-8 pm; Friday 8 am-6 pm; Saturday and Sunday 9 am-5 pm.     Clinic: [phone]   Pharmacy: [phone]

## 2018-11-30 NOTE — MR AVS SNAPSHOT
After Visit Summary   11/30/2018    Becky Hernandez    MRN: [id number]           Patient Information     Date Of Birth          1985        Visit Information        Provider Department      11/30/2018 3:00 PM Marcella Gottlieb MD [facility]        Today's Diagnoses     Fatigue, unspecified type    -  1    Screening for diabetes mellitus        Screening for malignant neoplasm of cervix        Need for prophylactic vaccination and inoculation against influenza          Care Instructions    At [facility], we strive to deliver an exceptional experience to you, every time we see you.  If you receive a survey in the mail, please send us back your thoughts. We really do value your feedback.    Your care team:                            Family Medicine Internal Medicine   MD Juan Manuel Washington MD Shantel Branch-Fleming, MD Katya Georgiev PA-C Megan Hill, APRGIBSON Montenegro MD Pediatrics   Winston Monterroso, CARISA Brock, MD Bernarda Lomeli APRN CNP   MD Hanh Bartlett MD Deborah Mielke, MD Kim Thein, APRR[facility]      Clinic hours: Monday - Thursday 7 am-7 pm; Fridays 7 am-5 pm.   Urgent care: Monday - Friday 11 am-9 pm; Saturday and Sunday 9 am-5 pm.  Pharmacy : Monday -Thursday 8 am-8 pm; Friday 8 am-6 pm; Saturday and Sunday 9 am-5 pm.     Clinic: [phone]   Pharmacy: [phone]                Follow-ups after your visit        Follow-up notes from your care team     Return in about 4 weeks (around 12/28/2018), or if symptoms worsen or fail to improve.      Who to contact     If you have questions or need follow up information about today's clinic visit or your schedule please contact [facility] directly at [phone].  Normal or non-critical lab and imaging results will be communicated to you by MyChart, letter or phone within 4 business days after the clinic has received  "the results. If you do not hear from us within 7 days, please contact the clinic through [employer] or phone. If you have a critical or abnormal lab result, we will notify you by phone as soon as possible.  Submit refill requests through [employer] or call your pharmacy and they will forward the refill request to us. Please allow 3 business days for your refill to be completed.          Additional Information About Your Visit        [employer] Information     [employer] lets you send messages to your doctor, view your test results, renew your prescriptions, schedule appointments and more. To sign up, go to [email or web address] . Click on \"Log in\" on the left side of the screen, which will take you to the Welcome page. Then click on \"Sign up Now\" on the right side of the page.     You will be asked to enter the access code listed below, as well as some personal information. Please follow the directions to create your username and password.     Your access code is: [email or web address]  Expires: 2019  4:44 PM     Your access code will  in 90 days. If you need help or a new code, please call your [place] clinic or [phone].        Care EveryWhere ID     This is your Care EveryWhere ID. This could be used by other organizations to access your [place] medical records  [id number]        Your Vitals Were     Pulse Temperature Height Last Period Pulse Oximetry Breastfeeding?    102 98.3  F (36.8  C) (Oral) 4' 10\" (1.473 m) 10/30/2018 (Approximate) 95% No    BMI (Body Mass Index)                   33.65 kg/m2            Blood Pressure from Last 3 Encounters:   18 106/74   17 108/72   17 115/79    Weight from Last 3 Encounters:   18 161 lb (73 kg)   17 149 lb (67.6 kg)   17 149 lb 12.8 oz (67.9 kg)              We Performed the Following     CBC with platelets differential     Comprehensive metabolic panel     Ferritin     FLU VACCINE, SPLIT VIRUS, IM (QUADRIVALENT) [38991]- >3 YRS     " Hemoglobin A1c     HPV High Risk Types DNA Cervical     Pap imaged thin layer screen with HPV - recommended age 30 - 65 years (select HPV order below)     TSH with free T4 reflex     Vaccine Administration, Initial [34657]     Vitamin B12     Vitamin D Deficiency          Today's Medication Changes          These changes are accurate as of 11/30/18  4:44 PM.  If you have any questions, ask your nurse or doctor.               Stop taking these medicines if you haven't already. Please contact your care team if you have questions.     prenatal multivitamin w/iron 27-0.8 MG tablet   Stopped by:  Marcella Gottlieb MD                    Primary Care Provider Office Phone # Fax #    Hazel Mendoza [phone] [address] [facility]  [facility] [place] [address]        Equal Access to Services     [facility] : Hadii aad ku hadasho Sotashia, waaxda luqadaha, qaybta kaalmada adeegyada, mario scherer . So [facility] [phone].    ATENCIÓN: Si habla español, tiene a whittaker disposición servicios gratuitos de asistencia lingüística. LlPaulding County Hospital [phone].    We comply with applicable federal civil rights laws and [place] laws. We do not discriminate on the basis of race, color, national origin, age, disability, sex, sexual orientation, or gender identity.            Thank you!     Thank you for choosing [facility]  for your care. Our goal is always to provide you with excellent care. Hearing back from our patients is one way we can continue to improve our services. Please take a few minutes to complete the written survey that you may receive in the mail after your visit with us. Thank you!             Your Updated Medication List - Protect others around you: Learn how to safely use, store and throw away your medicines at www.disposemymeds.org.          This list is accurate as of 11/30/18  4:44 PM.  Always use your most recent med list.                   Brand Name Dispense  Instructions for use Diagnosis    etonogestrel 68 MG Impl    NEXPLANON    1 each    One device, subdermally, for up to 3 years.    Nexplanon insertion

## 2018-11-30 NOTE — LETTER
December 6, 2018    Becky Hernandez  [address] [place] [id number]    Dear Becky Castrejon,  We are happy to inform you that your PAP smear result from 11/30/18 is normal.  We are now able to do a follow up test on PAP smears. The DNA test is for HPV (Human Papilloma Virus). Cervical cancer is closely linked with certain types of HPV. Your results showed no evidence of high risk HPV.  Therefore we recommend you return in 5 years for your next pap smear and HPV test.  You will still need to return to the clinic every year for an annual exam and other preventive tests.  If you have additional questions regarding this result, please call our registered nurse, Carol at [phone].  Sincerely,    Marcella Gottlieb MD/taylor

## 2018-11-30 NOTE — PROGRESS NOTES
SUBJECTIVE:   Becky Hernandez is a 33 year old female who presents to clinic today for the following health issues:      Sweaty and weakness      Duration: x 1 year    Intensity:  moderate    Accompanying signs and symptoms: none    History (similar episodes/previous evaluation): borderline glucose during pregnancy    Precipitating or alleviating factors: None    Therapies tried and outcome: None     No gestational diabetes, impaired+  No family history of diabetes  When feels hungry feels shaky, sweaty. Feels better when she eats  No loss of consciousness  Not able to tolerate hunger like she used to  Some nocturia  Feet feel warm  No recurring yeast infections   No regular exercise      Due for pap smear:  -No past abnormal pap smears  -Nexplanon for contraception     Problem list and histories reviewed & adjusted, as indicated.  Additional history: as documented    Patient Active Problem List   Diagnosis     CARDIOVASCULAR SCREENING; LDL GOAL LESS THAN 160     Past Surgical History:   Procedure Laterality Date     DILATION AND CURETTAGE SUCTION N/A 10/23/2014    Procedure: DILATION AND CURETTAGE SUCTION;  Surgeon: Ulisses Edwards MD;  Location: [facility]     HC INSERTION INTRAUTERINE DEVICE  2012    Mirena     HC REMOVE INTRAUTERINE DEVICE  2014       Social History   Substance Use Topics     Smoking status: Never Smoker     Smokeless tobacco: Never Used     Alcohol use No     Family History   Problem Relation Age of Onset     Asthma No family hx of      C.A.D. No family hx of      Diabetes No family hx of      Hypertension No family hx of      Cerebrovascular Disease No family hx of      Breast Cancer No family hx of      Cancer - colorectal No family hx of      Prostate Cancer No family hx of          Current Outpatient Prescriptions   Medication Sig Dispense Refill     etonogestrel (NEXPLANON) 68 MG IMPL One device, subdermally, for up to 3 years. 1 each 0     No Known Allergies  BP Readings from Last 3  "Encounters:   11/30/18 106/74   07/20/17 108/72   07/19/17 115/79    Wt Readings from Last 3 Encounters:   11/30/18 161 lb (73 kg)   07/20/17 149 lb (67.6 kg)   07/19/17 149 lb 12.8 oz (67.9 kg)                  Labs reviewed in EPIC    Reviewed and updated as needed this visit by clinical staff  Allergies  Meds       Reviewed and updated as needed this visit by Provider         ROS:  Constitutional, neuro, ENT, endocrine, pulmonary, cardiac, gastrointestinal, genitourinary, musculoskeletal, integument and psychiatric systems are negative, except as otherwise noted.    OBJECTIVE:                                                    /74  Pulse 102  Temp 98.3  F (36.8  C) (Oral)  Ht 4' 10\" (1.473 m)  Wt 161 lb (73 kg)  LMP 10/30/2018 (Approximate)  SpO2 95%  Breastfeeding? No  BMI 33.65 kg/m2  Body mass index is 33.65 kg/(m^2).  GENERAL APPEARANCE: healthy, alert and no distress  EYES: Eyes grossly normal to inspection and conjunctivae and sclerae normal  NECK: no adenopathy and trachea midline and normal to palpation  RESP: lungs clear to auscultation - no rales, rhonchi or wheezes  CV: regular rates and rhythm, normal S1 S2, no S3 or S4 and no murmur, click or rub  ABDOMEN: soft, non-tender   (female): normal cervix, adnexae, and uterus without masses or discharge  MS: extremities normal- no gross deformities noted  SKIN: acanthosis on the neck   NEURO: Normal strength and tone, mentation intact and speech normal  PSYCH: mentation appears normal    Diagnostic test results:  Diagnostic Test Results:  No results found for this or any previous visit (from the past 24 hour(s)).     ASSESSMENT/PLAN:                                                    1. Fatigue, unspecified type  -Await labs   - Vitamin D Deficiency  - Vitamin B12  - TSH with free T4 reflex  - CBC with platelets differential  - Comprehensive metabolic panel  - Ferritin    2. Screening for diabetes mellitus  - Hemoglobin A1c    3. Screening " for malignant neoplasm of cervix  -Screening guidelines reviewed   - Pap imaged thin layer screen with HPV - recommended age 30 - 65 years (select HPV order below)  - HPV High Risk Types DNA Cervical    4. Need for prophylactic vaccination and inoculation against influenza  -Done today      Follow up with Provider - if not better in 4 weeks      Marcella Gottlieb MD MPH    [facility]

## 2018-11-30 NOTE — PROGRESS NOTES

## 2018-12-03 LAB — DEPRECATED CALCIDIOL+CALCIFEROL SERPL-MC: 10 UG/L (ref 20–75)

## 2018-12-03 NOTE — PROGRESS NOTES
Please CALL patient:    Dear Becky Hernandez,    Blood test shows that she does have diabetes. Please follow up in clinic to discuss this further.     Thank you,    Marcella Gottlieb MD MPH

## 2018-12-04 LAB
COPATH REPORT: NORMAL
PAP: NORMAL

## 2018-12-05 ENCOUNTER — OFFICE VISIT (OUTPATIENT)
Dept: FAMILY MEDICINE | Facility: CLINIC | Age: 33
End: 2018-12-05
Payer: COMMERCIAL

## 2018-12-05 VITALS
WEIGHT: 161 LBS | BODY MASS INDEX: 33.8 KG/M2 | DIASTOLIC BLOOD PRESSURE: 74 MMHG | TEMPERATURE: 98.1 F | OXYGEN SATURATION: 98 % | SYSTOLIC BLOOD PRESSURE: 106 MMHG | HEART RATE: 89 BPM | HEIGHT: 58 IN

## 2018-12-05 DIAGNOSIS — E11.65 TYPE 2 DIABETES MELLITUS WITH HYPERGLYCEMIA, WITHOUT LONG-TERM CURRENT USE OF INSULIN (H): Primary | ICD-10-CM

## 2018-12-05 DIAGNOSIS — E78.5 HYPERLIPIDEMIA LDL GOAL <100: ICD-10-CM

## 2018-12-05 DIAGNOSIS — E55.9 VITAMIN D DEFICIENCY: ICD-10-CM

## 2018-12-05 LAB
FINAL DIAGNOSIS: NORMAL
HPV HR 12 DNA CVX QL NAA+PROBE: NEGATIVE
HPV16 DNA SPEC QL NAA+PROBE: NEGATIVE
HPV18 DNA SPEC QL NAA+PROBE: NEGATIVE
SPECIMEN DESCRIPTION: NORMAL
SPECIMEN SOURCE CVX/VAG CYTO: NORMAL

## 2018-12-05 PROCEDURE — 99207 C FOOT EXAM  NO CHARGE: CPT | Performed by: PREVENTIVE MEDICINE

## 2018-12-05 PROCEDURE — 99214 OFFICE O/P EST MOD 30 MIN: CPT | Performed by: PREVENTIVE MEDICINE

## 2018-12-05 RX ORDER — ERGOCALCIFEROL 1.25 MG/1
50000 CAPSULE, LIQUID FILLED ORAL
Qty: 8 CAPSULE | Refills: 0 | Status: SHIPPED | OUTPATIENT
Start: 2018-12-05 | End: 2019-06-14

## 2018-12-05 ASSESSMENT — PAIN SCALES - GENERAL: PAINLEVEL: NO PAIN (0)

## 2018-12-05 NOTE — PROGRESS NOTES
SUBJECTIVE:   Becky Hernandez is a 33 year old female who presents to clinic today for the following health issues:      Go over DM test results    Diabetes New Diagnosis       Patient is checking blood sugars: not at all    Diabetic concerns: other - new diagnosis      Symptoms of hypoglycemia (low blood sugar): none     Paresthesias (numbness or burning in feet) or sores: No     Date of last diabetic eye exam: DUE    BP Readings from Last 2 Encounters:   12/05/18 106/74   11/30/18 106/74     Hemoglobin A1C (%)   Date Value   11/30/2018 7.6 (H)       Diabetes Management Resources    Problem list and histories reviewed & adjusted, as indicated.  Additional history: as documented    Patient Active Problem List   Diagnosis     Type 2 diabetes mellitus with hyperglycemia, without long-term current use of insulin (H)     Hyperlipidemia LDL goal <100     Past Surgical History:   Procedure Laterality Date     DILATION AND CURETTAGE SUCTION N/A 10/23/2014    Procedure: DILATION AND CURETTAGE SUCTION;  Surgeon: Ulisses Edwards MD;  Location: [facility]     HC INSERTION INTRAUTERINE DEVICE  2012    Mirena     HC REMOVE INTRAUTERINE DEVICE  2014       Social History   Substance Use Topics     Smoking status: Never Smoker     Smokeless tobacco: Never Used     Alcohol use No     Family History   Problem Relation Age of Onset     Asthma No family hx of      C.A.D. No family hx of      Diabetes No family hx of      Hypertension No family hx of      Cerebrovascular Disease No family hx of      Breast Cancer No family hx of      Cancer - colorectal No family hx of      Prostate Cancer No family hx of          Current Outpatient Prescriptions   Medication Sig Dispense Refill     ASPIRIN NOT PRESCRIBED (INTENTIONAL) Please choose reason not prescribed, below 1 each 0     etonogestrel (NEXPLANON) 68 MG IMPL One device, subdermally, for up to 3 years. 1 each 0     metFORMIN (GLUCOPHAGE) 500 MG tablet Take 1 tablet (500 mg) by mouth  "2 times daily (with meals) 180 tablet 1     STATIN NOT PRESCRIBED (INTENTIONAL) Please choose reason not prescribed, below       vitamin D2 (ERGOCALCIFEROL) 93001 units (1250 mcg) capsule Take 1 capsule (50,000 Units) by mouth every 7 days for 8 doses 8 capsule 0     No Known Allergies  Recent Labs   Lab Test  11/30/18   1546   A1C  7.6*   ALT  46   CR  0.72   GFRESTIMATED  >90   GFRESTBLACK  >90   POTASSIUM  3.8   TSH  1.06      BP Readings from Last 3 Encounters:   12/05/18 106/74   11/30/18 106/74   07/20/17 108/72    Wt Readings from Last 3 Encounters:   12/05/18 161 lb (73 kg)   11/30/18 161 lb (73 kg)   07/20/17 149 lb (67.6 kg)                  Labs reviewed in EPIC    Reviewed and updated as needed this visit by clinical staff       Reviewed and updated as needed this visit by Provider         ROS:  Constitutional, neuro, ENT, endocrine, pulmonary, cardiac, gastrointestinal, genitourinary, musculoskeletal, integument and psychiatric systems are negative, except as otherwise noted.    OBJECTIVE:                                                    /74  Pulse 89  Temp 98.1  F (36.7  C) (Oral)  Ht 4' 10\" (1.473 m)  Wt 161 lb (73 kg)  LMP 11/01/2018 (Approximate)  SpO2 98%  Breastfeeding? No  BMI 33.65 kg/m2  Body mass index is 33.65 kg/(m^2).  GENERAL APPEARANCE: healthy, alert and no distress  EYES: Eyes grossly normal to inspection and conjunctivae and sclerae normal  RESP: lungs clear to auscultation - no rales, rhonchi or wheezes  CV: regular rates and rhythm, normal S1 S2, no S3 or S4 and no murmur, click or rub  ABDOMEN: soft, non-tender  MS: extremities normal- no gross deformities noted and peripheral pulses normal  SKIN: no suspicious lesions or rashes  NEURO: Normal strength and tone, mentation intact and speech normal  DIABETIC FOOT EXAM: normal DP and PT pulses, no trophic changes or ulcerative lesions and normal sensory exam  PSYCH: mentation appears normal    Diagnostic test " results:  Diagnostic Test Results:  Results for orders placed or performed in visit on 11/30/18   Pap imaged thin layer screen with HPV - recommended age 30 - 65 years (select HPV order below)   Result Value Ref Range    PAP NIL     Copath Report         Patient Name: WESLEY MOHAMUD  MR#: [id number]  Specimen #: [id number]  Collected: 11/30/2018  Received: 12/3/2018  Reported: 12/4/2018 14:14  Ordering Phy(s): KENYA ROGERS    For improved result formatting, select 'View Enhanced Report Format' under   Linked Documents section.    SPECIMEN/STAIN PROCESS:  Pap imaged thin layer prep screening (Surepath, FocalPoint with guided   screening)       Pap-Cyto x 1, HPV ordered x 1    SOURCE: Cervical, vaginal, endocervical  ----------------------------------------------------------------   Pap imaged thin layer prep screening (Surepath, FocalPoint with guided   screening)  SPECIMEN ADEQUACY:  Satisfactory for evaluation.  -Transformation zone component present.    CYTOLOGIC INTERPRETATION:    Negative for intraepithelial lesion or malignancy    Electronically signed out by:  LIAN Chang  (ASCP)    Processed and screened at [facility]    CLINICAL HISTORY:  LMP: 10/30/2018  A  previous normal pap  Date of Last Pap: 10/14/2014,    Papanicolaou Test Limitations:  Cervical cytology is a screening test with   limited sensitivity; regular  screening is critical for cancer prevention; Pap tests are primarily   effective for the diagnosis/prevention of  squamous cell carcinoma, not adenocarcinomas or other cancers.    TESTING LAB LOCATION:  [address]  [phone]    COLLECTION SITE:  Client:  [facility]  Location: [facility] (B)     HPV High Risk Types DNA Cervical   Result Value Ref Range    HPV Source SurePath     HPV 16 DNA Negative NEG^Negative    HPV 18 DNA  Negative NEG^Negative    Other HR HPV Negative NEG^Negative    Final Diagnosis This patient's sample is negative for HPV DNA.     Specimen Description Cervical Cells    Vitamin D Deficiency   Result Value Ref Range    Vitamin D Deficiency screening 10 (L) 20 - 75 ug/L   Vitamin B12   Result Value Ref Range    Vitamin B12 304 193 - 986 pg/mL   TSH with free T4 reflex   Result Value Ref Range    TSH 1.06 0.40 - 4.00 mU/L   CBC with platelets differential   Result Value Ref Range    WBC 7.7 4.0 - 11.0 10e9/L    RBC Count 4.46 3.8 - 5.2 10e12/L    Hemoglobin 13.5 11.7 - 15.7 g/dL    Hematocrit 40.6 35.0 - 47.0 %    MCV 91 78 - 100 fl    MCH 30.3 26.5 - 33.0 pg    MCHC 33.3 31.5 - 36.5 g/dL    RDW 12.2 10.0 - 15.0 %    Platelet Count 251 150 - 450 10e9/L    % Neutrophils 61.2 %    % Lymphocytes 31.7 %    % Monocytes 5.1 %    % Eosinophils 1.9 %    % Basophils 0.1 %    Absolute Neutrophil 4.7 1.6 - 8.3 10e9/L    Absolute Lymphocytes 2.5 0.8 - 5.3 10e9/L    Absolute Monocytes 0.4 0.0 - 1.3 10e9/L    Absolute Eosinophils 0.2 0.0 - 0.7 10e9/L    Absolute Basophils 0.0 0.0 - 0.2 10e9/L    Diff Method Automated Method    Comprehensive metabolic panel   Result Value Ref Range    Sodium 141 133 - 144 mmol/L    Potassium 3.8 3.4 - 5.3 mmol/L    Chloride 107 94 - 109 mmol/L    Carbon Dioxide 25 20 - 32 mmol/L    Anion Gap 9 3 - 14 mmol/L    Glucose 208 (H) 70 - 99 mg/dL    Urea Nitrogen 14 7 - 30 mg/dL    Creatinine 0.72 0.52 - 1.04 mg/dL    GFR Estimate >90 >60 mL/min/1.7m2    GFR Estimate If Black >90 >60 mL/min/1.7m2    Calcium 8.9 8.5 - 10.1 mg/dL    Bilirubin Total 0.3 0.2 - 1.3 mg/dL    Albumin 4.0 3.4 - 5.0 g/dL    Protein Total 7.7 6.8 - 8.8 g/dL    Alkaline Phosphatase 98 40 - 150 U/L    ALT 46 0 - 50 U/L    AST 30 0 - 45 U/L   Ferritin   Result Value Ref Range    Ferritin 87 12 - 150 ng/mL   Hemoglobin A1c   Result Value Ref Range    Hemoglobin A1C 7.6 (H) 0 - 5.6 %        ASSESSMENT/PLAN:                                                     1. Type 2 diabetes mellitus with hyperglycemia, without long-term current use of insulin (H)  -Labs reviewed with patient in detail  - ASPIRIN NOT PRESCRIBED (INTENTIONAL); Please choose reason not prescribed, below  Dispense: 1 each; Refill: 0  - STATIN NOT PRESCRIBED (INTENTIONAL); Please choose reason not prescribed, below  - FOOT EXAM  - OPTOMETRY REFERRAL  - metFORMIN (GLUCOPHAGE) 500 MG tablet; Take 1 tablet (500 mg) by mouth 2 times daily (with meals)  Dispense: 180 tablet; Refill: 1  - Lipid panel reflex to direct LDL Fasting; Future  - Hemoglobin A1c; Future  - Albumin Random Urine Quantitative with Creat Ratio; Future  -Information materials provided   Regular foot checks  Limit carbohydrates and sweets in diet  Update eye exam annually   Regular exercise, 150 minutes per week  Hypoglycemia precautions     2. Hyperlipidemia LDL goal <100  -will check fasting lipid panel with next labs   - Lipid panel reflex to direct LDL Fasting; Future    3. Vitamin D deficiency  -levels at 10  - vitamin D2 (ERGOCALCIFEROL) 91703 units (1250 mcg) capsule; Take 1 capsule (50,000 Units) by mouth every 7 days for 8 doses  Dispense: 8 capsule; Refill: 0  - Vitamin D Deficiency; Future      Follow up with Provider - labs in 8 weeks, fasting, scheduled   See me back in 6 months      Marcella Gottlieb MD MPH    [facility]

## 2018-12-05 NOTE — PATIENT INSTRUCTIONS
At [facility], we strive to deliver an exceptional experience to you, every time we see you.  If you receive a survey in the mail, please send us back your thoughts. We really do value your feedback.    Your care team:                            Family Medicine Internal Medicine   MD Juan Manuel Washington MD Shantel Branch-Fleming, MD Katya Georgiev PA-C Megan Hill, APRN CNP    Salvador Montenegro, MD Pediatrics   Winston Monterroso, CARISA Brock, MD Bernarda Lomeli APRN CNP   MD Hanh Bartlett MD Deborah Mielke, MD Maryse Roper, APRN [facility]      Clinic hours: Monday - Thursday 7 am-7 pm; Fridays 7 am-5 pm.   Urgent care: Monday - Friday 11 am-9 pm; Saturday and Sunday 9 am-5 pm.  Pharmacy : Monday -Thursday 8 am-8 pm; Friday 8 am-6 pm; Saturday and Sunday 9 am-5 pm.     Clinic: [phone]   Pharmacy: [phone]        Diet: Diabetes  Food is an important tool that you can use to control diabetes and stay healthy. Eating well-balanced meals in the correct amounts will help you control your blood glucose levels and prevent low blood sugar reactions. It will also help you reduce the health risks of diabetes. There is no one specific diet that is right for everyone with diabetes. But there are general guidelines to follow. A registered dietitian (RD) will create a tailored diet approach that s just right for you. He or she will also help you plan healthy meals and snacks. If you have any questions, call your dietitian for advice.     Guidelines for success  Talk with your healthcare provider before starting a diabetes diet or weight loss program. If you haven't talked with a dietitian yet, ask your provider for a referral. The following guidelines can help you succeed:    Select foods from the 6 food groups below. Your dietitian will help you find food choices within each group. He or she will also show you serving sizes and how many servings you can  have at each meal.  ? Grains, beans, and starchy vegetables  ? Vegetables  ? Fruit  ? Milk or yogurt  ? Meat, poultry, fish, or tofu  ? Healthy fats    Check your blood sugar levels as directed by your provider. Take any medicine as prescribed by your provider.    Learn to read food labels and pick the right portion sizes.    Eat only the amount of food in your meal plan. Eat about the same amount of food at regular times each day. Don t skip meals. Eat meals 4 to 5 hours apart, with snacks in between.    Limit alcohol. It raises blood sugar levels. Drink water or calorie-free diet drinks that use safe sweeteners.    Eat less fat to help lower your risk of heart disease. Use nonfat or low-fat dairy products and lean meats. Avoid fried foods. Use cooking oils that are unsaturated, such as olive, canola, or peanut oil.    Talk with your dietitian about safe sugar substitutes.    Avoid added salt. It can contribute to high blood pressure, which can cause heart disease. People with diabetes already have a risk of high blood pressure and heart disease.    Stay at a healthy weight. If you need to lose weight, cut down on your portion sizes. But don t skip meals. Exercise is an important part of any weight management program. Talk with your provider about an exercise program that s right for you.    For more information about the best diet plan for you, talk with a registered dietitian (RD). To find an RD in your area, contact:  ? Academy of Nutrition and Dietetics www.eatright.org  ? The American Diabetes Association [phone] www.diabetes.org  Date Last Reviewed: 8/1/2016 2000-2018 [employer]. [address] [place] [address], [place] [address]. All rights reserved. This information is not intended as a substitute for professional medical care. Always follow your healthcare professional's instructions.        Do You Have Diabetes?    Diabetes is a condition in which your body has trouble using a sugar called  glucose for energy. As a result, the sugar level in your blood becomes too high. Diabetes is a chronic (lifelong) condition. Left untreated, it can result in major health problems (complications) or serious life-threatening conditions such as ketoacidosis.   Signs of diabetes  Do any of the following questions apply to you? If so, see your healthcare provider.     Do you feel tired all the time?    Do you urinate often?    Do you feel thirsty or hungry all the time?    Are you losing weight for no reason?    Do cuts and bruises heal slowly?    Do you have numbness or tingling in your fingers or toes?    Do you have blurry vision?   What puts you at risk?  People of all backgrounds can get diabetes. More often, though, it affects  Americans, Native Americans, Hispanics,  Americans, and Pacific Islanders. Other factors that increase risk include:    A family history of diabetes    Being overweight    Being over age 40    Having had gestational diabetes (diabetes during pregnancy)    Not enough physical activity    If you take certain medicines   Why worry about diabetes?  Reasons include the following:     Diabetes keeps your body from turning food into energy.    Diabetes can cause problems with your eyes, kidneys, nerves, and feet. It can also hurt your heart and blood vessels.    Once you get diabetes, it won t go away.  See your healthcare provider for a checkup if you have any of the signs or risks listed above.   Date Last Reviewed: 6/1/2016 2000-2018 [employer]. [address] [place] [address]. All rights reserved. This information is not intended as a substitute for professional medical care. Always follow your healthcare professional's instructions.        Before You Start Your Diabetes Exercise Plan    Fitness plays a special role for people who have diabetes. Being fit means becoming healthier by adding activity to your day. Talk to your healthcare provider before  getting started. He or she may want you to have a checkup before you become more active. Also, having certain tests first helps you and your healthcare provider learn how you will respond to a fitness program.  Your checkup  A medical checkup helps ensure that your fitness plan will bring you the most benefit. It also keeps at a minimum the risks that may come with physical activity. As part of your checkup:    You may have a test called a hemoglobin A1C. The A1C test measures your average glucose (sugar) level over 2 to 3 months. A1C is usually given as a percentage. But it is now also given as a number representing estimated Average Glucose (eAG). Unlike the A1C percentage, eAG gives you a number similar to the numbers listed on your daily glucose monitor.    Your healthcare provider may check the health of your heart with a resting electrocardiogram. Diabetes can cause heart problems. But a fitness program can help these problems get better. Being fit can also help improve your cholesterol and blood pressure levels.    Your healthcare provider may check the health of your feet. People who have diabetes can have problems with their feet. Your healthcare provider can check your feet before you become more active. Take time to find shoes that will support your feet well while you exercise.   If you have an exercise stress test  An exercise stress test can show how your heart responds to activity and what level of exercise is right for you. You will have small electrodes placed on your chest. You will then walk on a treadmill or ride an exercise bike while your heart rate is monitored. If you have this test, your healthcare provider will give you more details.     Date Last Reviewed: 7/1/2016 2000-2018 [employer]. [address] [place] [address], [place] [address]. All rights reserved. This information is not intended as a substitute for professional medical care. Always follow your healthcare professional's  instructions.

## 2018-12-05 NOTE — MR AVS SNAPSHOT
After Visit Summary   12/5/2018    Becky Hernandez    MRN: [id number]           Patient Information     Date Of Birth          1985        Visit Information        Provider Department      12/5/2018 4:40 PM Marcella Gottlieb MD [facility]        Today's Diagnoses     Type 2 diabetes mellitus with hyperglycemia, without long-term current use of insulin (H)    -  1    Hyperlipidemia LDL goal <100        Vitamin D deficiency          Care Instructions    At [facility], we strive to deliver an exceptional experience to you, every time we see you.  If you receive a survey in the mail, please send us back your thoughts. We really do value your feedback.    Your care team:                            Family Medicine Internal Medicine   MD Juan Manuel Washington MD Shantel Branch-Fleming, MD Katya Georgiev PA-C Megan Hill, APRGIBSON Montenegro MD Pediatrics   CARISA Ortiz, MD Bernarda Lomeli APRN CNP   MD Hanh Bartlett MD Deborah Mielke, MD Kim Thein, APRN [facility]      Clinic hours: Monday - Thursday 7 am-7 pm; Fridays 7 am-5 pm.   Urgent care: Monday - Friday 11 am-9 pm; Saturday and Sunday 9 am-5 pm.  Pharmacy : Monday -Thursday 8 am-8 pm; Friday 8 am-6 pm; Saturday and Sunday 9 am-5 pm.     Clinic: [phone]   Pharmacy: [phone]        Diet: Diabetes  Food is an important tool that you can use to control diabetes and stay healthy. Eating well-balanced meals in the correct amounts will help you control your blood glucose levels and prevent low blood sugar reactions. It will also help you reduce the health risks of diabetes. There is no one specific diet that is right for everyone with diabetes. But there are general guidelines to follow. A registered dietitian (RD) will create a tailored diet approach that s just right for you. He or she will also help you plan healthy meals and  snacks. If you have any questions, call your dietitian for advice.     Guidelines for success  Talk with your healthcare provider before starting a diabetes diet or weight loss program. If you haven't talked with a dietitian yet, ask your provider for a referral. The following guidelines can help you succeed:    Select foods from the 6 food groups below. Your dietitian will help you find food choices within each group. He or she will also show you serving sizes and how many servings you can have at each meal.  ? Grains, beans, and starchy vegetables  ? Vegetables  ? Fruit  ? Milk or yogurt  ? Meat, poultry, fish, or tofu  ? Healthy fats    Check your blood sugar levels as directed by your provider. Take any medicine as prescribed by your provider.    Learn to read food labels and pick the right portion sizes.    Eat only the amount of food in your meal plan. Eat about the same amount of food at regular times each day. Don t skip meals. Eat meals 4 to 5 hours apart, with snacks in between.    Limit alcohol. It raises blood sugar levels. Drink water or calorie-free diet drinks that use safe sweeteners.    Eat less fat to help lower your risk of heart disease. Use nonfat or low-fat dairy products and lean meats. Avoid fried foods. Use cooking oils that are unsaturated, such as olive, canola, or peanut oil.    Talk with your dietitian about safe sugar substitutes.    Avoid added salt. It can contribute to high blood pressure, which can cause heart disease. People with diabetes already have a risk of high blood pressure and heart disease.    Stay at a healthy weight. If you need to lose weight, cut down on your portion sizes. But don t skip meals. Exercise is an important part of any weight management program. Talk with your provider about an exercise program that s right for you.    For more information about the best diet plan for you, talk with a registered dietitian (RD). To find an RD in your area,  contact:  ? Academy of Nutrition and Dietetics www.eatright.org  ? The American Diabetes Association 003-086-7117 www.diabetes.org  Date Last Reviewed: 8/1/2016 2000-2018 [employer]. [address] [place] [address], [place] [address]. All rights reserved. This information is not intended as a substitute for professional medical care. Always follow your healthcare professional's instructions.        Do You Have Diabetes?    Diabetes is a condition in which your body has trouble using a sugar called glucose for energy. As a result, the sugar level in your blood becomes too high. Diabetes is a chronic (lifelong) condition. Left untreated, it can result in major health problems (complications) or serious life-threatening conditions such as ketoacidosis.   Signs of diabetes  Do any of the following questions apply to you? If so, see your healthcare provider.     Do you feel tired all the time?    Do you urinate often?    Do you feel thirsty or hungry all the time?    Are you losing weight for no reason?    Do cuts and bruises heal slowly?    Do you have numbness or tingling in your fingers or toes?    Do you have blurry vision?   What puts you at risk?  People of all backgrounds can get diabetes. More often, though, it affects  Americans, Native Americans, Hispanics,  Americans, and Pacific Islanders. Other factors that increase risk include:    A family history of diabetes    Being overweight    Being over age 40    Having had gestational diabetes (diabetes during pregnancy)    Not enough physical activity    If you take certain medicines   Why worry about diabetes?  Reasons include the following:     Diabetes keeps your body from turning food into energy.    Diabetes can cause problems with your eyes, kidneys, nerves, and feet. It can also hurt your heart and blood vessels.    Once you get diabetes, it won t go away.  See your healthcare provider for a checkup if you have any of the signs or risks  listed above.   Date Last Reviewed: 6/1/2016 2000-2018 The [employer]. [address] [place] [address], [place] [address]. All rights reserved. This information is not intended as a substitute for professional medical care. Always follow your healthcare professional's instructions.        Before You Start Your Diabetes Exercise Plan    Fitness plays a special role for people who have diabetes. Being fit means becoming healthier by adding activity to your day. Talk to your healthcare provider before getting started. He or she may want you to have a checkup before you become more active. Also, having certain tests first helps you and your healthcare provider learn how you will respond to a fitness program.  Your checkup  A medical checkup helps ensure that your fitness plan will bring you the most benefit. It also keeps at a minimum the risks that may come with physical activity. As part of your checkup:    You may have a test called a hemoglobin A1C. The A1C test measures your average glucose (sugar) level over 2 to 3 months. A1C is usually given as a percentage. But it is now also given as a number representing estimated Average Glucose (eAG). Unlike the A1C percentage, eAG gives you a number similar to the numbers listed on your daily glucose monitor.    Your healthcare provider may check the health of your heart with a resting electrocardiogram. Diabetes can cause heart problems. But a fitness program can help these problems get better. Being fit can also help improve your cholesterol and blood pressure levels.    Your healthcare provider may check the health of your feet. People who have diabetes can have problems with their feet. Your healthcare provider can check your feet before you become more active. Take time to find shoes that will support your feet well while you exercise.   If you have an exercise stress test  An exercise stress test can show how your heart responds to activity and what level of  exercise is right for you. You will have small electrodes placed on your chest. You will then walk on a treadmill or ride an exercise bike while your heart rate is monitored. If you have this test, your healthcare provider will give you more details.     Date Last Reviewed: 7/1/2016 2000-2018 The [employer]. [address] [place] [address]. All rights reserved. This information is not intended as a substitute for professional medical care. Always follow your healthcare professional's instructions.                Follow-ups after your visit        Additional Services     OPTOMETRY REFERRAL       Your provider has referred you to: [facility]: [facility] [phone]    [email or web address]    Please be aware that coverage of these services is subject to the terms and limitations of your health insurance plan.  Call member services at your health plan with any benefit or coverage questions.      Please bring the following with you to your appointment:    (1) Any X-Rays, CTs or MRIs which have been performed.  Contact the facility where they were done to arrange for  prior to your scheduled appointment.    (2) List of current medications  (3) This referral request   (4) Any documents/labs given to you for this referral                  Future tests that were ordered for you today     Open Future Orders        Priority Expected Expires Ordered    Lipid panel reflex to direct LDL Fasting Routine  8/5/2019 12/5/2018    Hemoglobin A1c Routine  8/5/2019 12/5/2018    Albumin Random Urine Quantitative with Creat Ratio Routine  8/5/2019 12/5/2018    Vitamin D Deficiency Routine  8/5/2019 12/5/2018            Who to contact     If you have questions or need follow up information about today's clinic visit or your schedule please contact [facility] directly at [phone].  Normal or non-critical lab and imaging results will  "be communicated to you by MyChart, letter or phone within 4 business days after the clinic has received the results. If you do not hear from us within 7 days, please contact the clinic through "[employer] or phone. If you have a critical or abnormal lab result, we will notify you by phone as soon as possible.  Submit refill requests through "[employer] or call your pharmacy and they will forward the refill request to us. Please allow 3 business days for your refill to be completed.          Additional Information About Your Visit        "[employer] Information     "[employer] lets you send messages to your doctor, view your test results, renew your prescriptions, schedule appointments and more. To sign up, go to [email or web address]/"[employer] . Click on \"Log in\" on the left side of the screen, which will take you to the Welcome page. Then click on \"Sign up Now\" on the right side of the page.     You will be asked to enter the access code listed below, as well as some personal information. Please follow the directions to create your username and password.     Your access code is: [email or web address]  Expires: 2019  4:44 PM     Your access code will  in 90 days. If you need help or a new code, please call your [facility] clinic or [phone].        Care EveryWhere ID     This is your Care EveryWhere ID. This could be used by other organizations to access your [facility] medical records  [id number]        Your Vitals Were     Pulse Temperature Height Last Period Pulse Oximetry Breastfeeding?    89 98.1  F (36.7  C) (Oral) 4' 10\" (1.473 m) 2018 (Approximate) 98% No    BMI (Body Mass Index)                   33.65 kg/m2            Blood Pressure from Last 3 Encounters:   18 106/74   18 106/74   17 108/72    Weight from Last 3 Encounters:   18 161 lb (73 kg)   18 161 lb (73 kg)   17 149 lb (67.6 kg)              We Performed the Following     FOOT EXAM     OPTOMETRY REFERRAL          Today's " Medication Changes          These changes are accurate as of 12/5/18  5:14 PM.  If you have any questions, ask your nurse or doctor.               Start taking these medicines.        Dose/Directions    ASPIRIN NOT PRESCRIBED   Commonly known as:  INTENTIONAL   Used for:  Type 2 diabetes mellitus with hyperglycemia, without long-term current use of insulin (H)   Started by:  Marcella Gottlieb MD        Please choose reason not prescribed, below   Quantity:  1 each   Refills:  0       metFORMIN 500 MG tablet   Commonly known as:  GLUCOPHAGE   Used for:  Type 2 diabetes mellitus with hyperglycemia, without long-term current use of insulin (H)   Started by:  Marcella Gottlieb MD        Dose:  500 mg   Take 1 tablet (500 mg) by mouth 2 times daily (with meals)   Quantity:  180 tablet   Refills:  1       STATIN NOT PRESCRIBED   Commonly known as:  INTENTIONAL   Used for:  Type 2 diabetes mellitus with hyperglycemia, without long-term current use of insulin (H)   Started by:  Marcella Gottlieb MD        Please choose reason not prescribed, below   Refills:  0       vitamin D2 58237 units (1250 mcg) capsule   Commonly known as:  ERGOCALCIFEROL   Used for:  Vitamin D deficiency   Started by:  Marcella Gottlieb MD        Dose:  90657 Units   Take 1 capsule (50,000 Units) by mouth every 7 days for 8 doses   Quantity:  8 capsule   Refills:  0            Where to get your medicines      These medications were sent to [facility] [address] [facility] [address] [facility] [address] [facility]  [address] [facility] [address]     Phone:  [phone]     metFORMIN 500 MG tablet    vitamin D2 41505 units (1250 mcg) capsule         Some of these will need a paper prescription and others can be bought over the counter.  Ask your nurse if you have questions.     You don't need a prescription for these medications     ASPIRIN NOT PRESCRIBED    STATIN NOT PRESCRIBED                Primary Care  Provider Office Phone # Fax #    Hazel Mendoza [phone]       [address] [facility]  [place] [address]        Equal Access to Services     MYRAJOHNSON ROMAN : Noam annelise andrade margaret Sotashia, waaxda luqadaha, qaybta kaalmada adehemalathada, mario griffin laWilliamlena ranjeet. So [facility] [phone].    ATENCIÓN: Si habla español, tiene a whittaker disposición servicios gratuitos de asistencia lingüística. Llame al [phone].    We comply with applicable federal civil rights laws and [place] laws. We do not discriminate on the basis of race, color, national origin, age, disability, sex, sexual orientation, or gender identity.            Thank you!     Thank you for choosing [facility]  for your care. Our goal is always to provide you with excellent care. Hearing back from our patients is one way we can continue to improve our services. Please take a few minutes to complete the written survey that you may receive in the mail after your visit with us. Thank you!             Your Updated Medication List - Protect others around you: Learn how to safely use, store and throw away your medicines at www.disposemymeds.org.          This list is accurate as of 12/5/18  5:14 PM.  Always use your most recent med list.                   Brand Name Dispense Instructions for use Diagnosis    ASPIRIN NOT PRESCRIBED    INTENTIONAL    1 each    Please choose reason not prescribed, below    Type 2 diabetes mellitus with hyperglycemia, without long-term current use of insulin (H)       etonogestrel 68 MG Impl    NEXPLANON    1 each    One device, subdermally, for up to 3 years.    Nexplanon insertion       metFORMIN 500 MG tablet    GLUCOPHAGE    180 tablet    Take 1 tablet (500 mg) by mouth 2 times daily (with meals)    Type 2 diabetes mellitus with hyperglycemia, without long-term current use of insulin (H)       STATIN NOT PRESCRIBED    INTENTIONAL     Please choose reason not prescribed, below     Type 2 diabetes mellitus with hyperglycemia, without long-term current use of insulin (H)       vitamin D2 52930 units (1250 mcg) capsule    ERGOCALCIFEROL    8 capsule    Take 1 capsule (50,000 Units) by mouth every 7 days for 8 doses    Vitamin D deficiency

## 2019-02-11 DIAGNOSIS — E78.5 HYPERLIPIDEMIA LDL GOAL <100: ICD-10-CM

## 2019-02-11 DIAGNOSIS — E55.9 VITAMIN D DEFICIENCY: ICD-10-CM

## 2019-02-11 DIAGNOSIS — E11.65 TYPE 2 DIABETES MELLITUS WITH HYPERGLYCEMIA, WITHOUT LONG-TERM CURRENT USE OF INSULIN (H): ICD-10-CM

## 2019-02-11 LAB
CHOLEST SERPL-MCNC: 157 MG/DL
CREAT UR-MCNC: 316 MG/DL
HBA1C MFR BLD: 6.6 % (ref 0–5.6)
HDLC SERPL-MCNC: 40 MG/DL
LDLC SERPL CALC-MCNC: 74 MG/DL
MICROALBUMIN UR-MCNC: 31 MG/L
MICROALBUMIN/CREAT UR: 9.75 MG/G CR (ref 0–25)
NONHDLC SERPL-MCNC: 117 MG/DL
TRIGL SERPL-MCNC: 216 MG/DL

## 2019-02-11 PROCEDURE — 83036 HEMOGLOBIN GLYCOSYLATED A1C: CPT | Performed by: PREVENTIVE MEDICINE

## 2019-02-11 PROCEDURE — 82306 VITAMIN D 25 HYDROXY: CPT | Performed by: PREVENTIVE MEDICINE

## 2019-02-11 PROCEDURE — 36415 COLL VENOUS BLD VENIPUNCTURE: CPT | Performed by: PREVENTIVE MEDICINE

## 2019-02-11 PROCEDURE — 82043 UR ALBUMIN QUANTITATIVE: CPT | Performed by: PREVENTIVE MEDICINE

## 2019-02-11 PROCEDURE — 80061 LIPID PANEL: CPT | Performed by: PREVENTIVE MEDICINE

## 2019-02-11 NOTE — LETTER
February 12, 2019      Becky Hernandez  [address] [place] [id number]          Dear Becky Hernandez    Here are your cholesterol results:    Your LDL is: Lab Results       Component                Value               Date                       LDL                      74                  02/11/2019          Your LDL goal is to be less than 100  Your HDL is: Lab Results       Component                Value               Date                       HDL                      40                  02/11/2019           Goal HDL is Greater than 40 (for men) or 50 (for women).  Your Triglycerides are: Lab Results       Component                Value               Date                       TRIG                     216                 02/11/2019         Goal TRIGLYCERIDES are less than 150.       Here are some ways to improve your cholesterol without medication:    Try to get at least 45 minutes of aerobic exercise 5-6 days a week  Maintain a healthy body weight  Eat less saturated fats  Buy lean cuts of meat, reduce your portions of red meat or substitute poultry or fish  Avoid fried or fast foods that are high in fat  Eat more fruits and vegetables    Urine sample did not show any abnormal protein.  Vitamin D levels are improved from 2 months ago.  Three month glucose value is at goal at 6.6 and improved from last check.  Plan of care and follow up as discussed in clinic.  Please let me know if you have any questions and thank you for choosing [facility].      Regards,    Marcella Gottlieb MD MPH/ag        Results for orders placed or performed in visit on 02/11/19   Hemoglobin A1c   Result Value Ref Range    Hemoglobin A1C 6.6 (H) 0 - 5.6 %   Lipid panel reflex to direct LDL Fasting   Result Value Ref Range    Cholesterol 157 <200 mg/dL    Triglycerides 216 (H) <150 mg/dL    HDL Cholesterol 40 (L) >49 mg/dL    LDL Cholesterol Calculated 74 <100 mg/dL    Non HDL Cholesterol 117 <130 mg/dL   Vitamin D  Deficiency   Result Value Ref Range    Vitamin D Deficiency screening 22 20 - 75 ug/L   Albumin Random Urine Quantitative with Creat Ratio   Result Value Ref Range    Creatinine Urine 316 mg/dL    Albumin Urine mg/L 31 mg/L    Albumin Urine mg/g Cr 9.75 0 - 25 mg/g Cr

## 2019-02-12 LAB — DEPRECATED CALCIDIOL+CALCIFEROL SERPL-MC: 22 UG/L (ref 20–75)

## 2019-02-28 ENCOUNTER — TELEPHONE (OUTPATIENT)
Dept: FAMILY MEDICINE | Facility: CLINIC | Age: 34
End: 2019-02-28

## 2019-02-28 NOTE — LETTER
March 1, 2019      Becky Hernandez  [address] [place] [id number]    Dear Becky Hernandez    Here are your cholesterol results:    Your LDL is: Lab Results       Component                Value               Date                       LDL                      74                  02/11/2019          Your LDL goal is to be less than 100  Your HDL is: Lab Results       Component                Value               Date                       HDL                      40                  02/11/2019           Goal HDL is Greater than 40 (for men) or 50 (for women).  Your Triglycerides are: Lab Results       Component                Value               Date                       TRIG                     216                 02/11/2019         Goal TRIGLYCERIDES are less than 150.       Here are some ways to improve your cholesterol without medication:    Try to get at least 45 minutes of aerobic exercise 5-6 days a week  Maintain a healthy body weight  Eat less saturated fats  Buy lean cuts of meat, reduce your portions of red meat or substitute poultry or fish  Avoid fried or fast foods that are high in fat  Eat more fruits and vegetables    Urine sample did not show any abnormal protein.  Vitamin D levels are improved from 2 months ago.  Three month glucose value is at goal at 6.6 and improved from last check.  Plan of care and follow up as discussed in clinic.  Please let me know if you have any questions and thank you for choosing [facility].      Regards,    Marcella Gottlieb MD MPH/smj    Results for orders placed or performed in visit on 02/11/19   Hemoglobin A1c   Result Value Ref Range    Hemoglobin A1C 6.6 (H) 0 - 5.6 %   Lipid panel reflex to direct LDL Fasting   Result Value Ref Range    Cholesterol 157 <200 mg/dL    Triglycerides 216 (H) <150 mg/dL    HDL Cholesterol 40 (L) >49 mg/dL    LDL Cholesterol Calculated 74 <100 mg/dL    Non HDL Cholesterol 117 <130 mg/dL   Vitamin D Deficiency   Result  Value Ref Range    Vitamin D Deficiency screening 22 20 - 75 ug/L   Albumin Random Urine Quantitative with Creat Ratio   Result Value Ref Range    Creatinine Urine 316 mg/dL    Albumin Urine mg/L 31 mg/L    Albumin Urine mg/g Cr 9.75 0 - 25 mg/g Cr                        No

## 2019-02-28 NOTE — TELEPHONE ENCOUNTER
Reason for Call:  Other call back    Detailed comments: Had labs done on 02/11. Never received letter or phone call. Asking for a call back with those result. Phone and address on file are correct and  have been verified.  Thank you     Phone Number Patient can be reached at: Home number on file [phone] (home)    Best Time: Any    Can we leave a detailed message on this number? YES    Call taken on 2/28/2019 at 4:57 PM by Vidya Davis

## 2019-03-01 NOTE — TELEPHONE ENCOUNTER
Patient notified of results, plan and mailed results again as never received verified address was correct.  Amrita CARRANZA

## 2019-04-29 ENCOUNTER — OFFICE VISIT (OUTPATIENT)
Dept: URGENT CARE | Facility: URGENT CARE | Age: 34
End: 2019-04-29
Payer: COMMERCIAL

## 2019-04-29 VITALS
TEMPERATURE: 98.4 F | WEIGHT: 161.2 LBS | HEIGHT: 58 IN | DIASTOLIC BLOOD PRESSURE: 74 MMHG | SYSTOLIC BLOOD PRESSURE: 113 MMHG | RESPIRATION RATE: 20 BRPM | OXYGEN SATURATION: 97 % | BODY MASS INDEX: 33.84 KG/M2 | HEART RATE: 94 BPM

## 2019-04-29 DIAGNOSIS — J30.2 SEASONAL ALLERGIC RHINITIS, UNSPECIFIED TRIGGER: Primary | ICD-10-CM

## 2019-04-29 DIAGNOSIS — E11.65 TYPE 2 DIABETES MELLITUS WITH HYPERGLYCEMIA, WITHOUT LONG-TERM CURRENT USE OF INSULIN (H): ICD-10-CM

## 2019-04-29 DIAGNOSIS — H10.13 ALLERGIC CONJUNCTIVITIS, BILATERAL: ICD-10-CM

## 2019-04-29 PROCEDURE — 99214 OFFICE O/P EST MOD 30 MIN: CPT | Performed by: PHYSICIAN ASSISTANT

## 2019-04-29 RX ORDER — FLUTICASONE PROPIONATE 50 MCG
2 SPRAY, SUSPENSION (ML) NASAL DAILY
Qty: 15.8 ML | Refills: 1 | Status: SHIPPED | OUTPATIENT
Start: 2019-04-29 | End: 2019-07-02

## 2019-04-29 RX ORDER — OLOPATADINE HYDROCHLORIDE 2 MG/ML
1 SOLUTION/ DROPS OPHTHALMIC DAILY
Qty: 1 BOTTLE | Refills: 0 | Status: SHIPPED | OUTPATIENT
Start: 2019-04-29 | End: 2019-11-05

## 2019-04-29 ASSESSMENT — ENCOUNTER SYMPTOMS
JOINT SWELLING: 0
RESPIRATORY NEGATIVE: 1
DIZZINESS: 0
BRUISES/BLEEDS EASILY: 0
WOUND: 0
ARTHRALGIAS: 0
HEMATOLOGIC/LYMPHATIC NEGATIVE: 1
COUGH: 0
FEVER: 0
CARDIOVASCULAR NEGATIVE: 1
SORE THROAT: 0
PALPITATIONS: 0
NECK PAIN: 0
MYALGIAS: 0
VOMITING: 0
NECK STIFFNESS: 0
NAUSEA: 0
CHILLS: 0
HEADACHES: 0
BACK PAIN: 0
EYE ITCHING: 1
DIARRHEA: 0
MUSCULOSKELETAL NEGATIVE: 1
RHINORRHEA: 1
WEAKNESS: 0
ENDOCRINE NEGATIVE: 1
ALLERGIC/IMMUNOLOGIC NEGATIVE: 1
SHORTNESS OF BREATH: 0
LIGHT-HEADEDNESS: 0

## 2019-04-29 ASSESSMENT — MIFFLIN-ST. JEOR: SCORE: 1324.27

## 2019-04-29 NOTE — PROGRESS NOTES
Chief Complaint:     Chief Complaint   Patient presents with     Allergies     Runny nose, sneezing & itchy eyes started last week- getting worse. Tried OTC medication but it was not helpful       HPI: Becky Hernandez is an 34 year old female who presents with nasal congestion, post nasal drainage, clear rhinorrhea and itchy eyes. It began  3 day(s) ago and has unchanged.  She has been using OTC allergy medications with some relief.    Recent travel?  no.      ROS:     Review of Systems   Constitutional: Negative for chills and fever.   HENT: Positive for congestion and rhinorrhea. Negative for ear pain and sore throat.    Eyes: Positive for itching.   Respiratory: Negative.  Negative for cough and shortness of breath.    Cardiovascular: Negative.  Negative for chest pain and palpitations.   Gastrointestinal: Negative for diarrhea, nausea and vomiting.   Endocrine: Negative.    Genitourinary: Negative.    Musculoskeletal: Negative.  Negative for arthralgias, back pain, joint swelling, myalgias, neck pain and neck stiffness.   Skin: Negative.  Negative for rash and wound.   Allergic/Immunologic: Negative.  Negative for immunocompromised state.   Neurological: Negative for dizziness, weakness, light-headedness and headaches.   Hematological: Negative.  Does not bruise/bleed easily.        Respiratory History  occasional episodes of bronchitis       Family History   Family History   Problem Relation Age of Onset     Asthma No family hx of      C.A.D. No family hx of      Diabetes No family hx of      Hypertension No family hx of      Cerebrovascular Disease No family hx of      Breast Cancer No family hx of      Cancer - colorectal No family hx of      Prostate Cancer No family hx of         Problem history  Patient Active Problem List   Diagnosis     Type 2 diabetes mellitus with hyperglycemia, without long-term current use of insulin (H)     Hyperlipidemia LDL goal <100        Allergies  No Known Allergies     Social  History  Social History     Socioeconomic History     Marital status: Single     Spouse name: Deep     Number of children: 5     Years of education: Not on file     Highest education level: Not on file   Occupational History     Occupation:    Social Needs     Financial resource strain: Not on file     Food insecurity:     Worry: Not on file     Inability: Not on file     Transportation needs:     Medical: Not on file     Non-medical: Not on file   Tobacco Use     Smoking status: Never Smoker     Smokeless tobacco: Never Used   Substance and Sexual Activity     Alcohol use: No     Drug use: No     Sexual activity: Yes     Partners: Male   Lifestyle     Physical activity:     Days per week: Not on file     Minutes per session: Not on file     Stress: Not on file   Relationships     Social connections:     Talks on phone: Not on file     Gets together: Not on file     Attends [employer]ism service: Not on file     Active member of club or organization: Not on file     Attends meetings of clubs or organizations: Not on file     Relationship status: Not on file     Intimate partner violence:     Fear of current or ex partner: Not on file     Emotionally abused: Not on file     Physically abused: Not on file     Forced sexual activity: Not on file   Other Topics Concern     Parent/sibling w/ CABG, MI or angioplasty before 65F 55M? Not Asked      Service No     Blood Transfusions No     Caffeine Concern No     Occupational Exposure No     Hobby Hazards No     Sleep Concern No     Stress Concern No     Weight Concern No     Special Diet No     Back Care No     Exercise No     Bike Helmet Not Asked     Comment: she doesn't ride a bike     Seat Belt Yes     Self-Exams No   Social History Narrative    From [place] to [place] in [place].    [place].         Current Meds    Current Outpatient Medications:      etonogestrel (NEXPLANON) 68 MG IMPL, One device, subdermally, for up to 3 years., Disp: 1 each, Rfl: 0      "fluticasone (FLONASE) 50 MCG/ACT nasal spray, Spray 2 sprays into both nostrils daily, Disp: 15.8 mL, Rfl: 1     metFORMIN (GLUCOPHAGE) 500 MG tablet, Take 1 tablet (500 mg) by mouth 2 times daily (with meals), Disp: 180 tablet, Rfl: 1     olopatadine (PATADAY) 0.2 % ophthalmic solution, Place 1 drop into both eyes daily, Disp: 1 Bottle, Rfl: 0     ASPIRIN NOT PRESCRIBED (INTENTIONAL), Please choose reason not prescribed, below, Disp: 1 each, Rfl: 0     STATIN NOT PRESCRIBED (INTENTIONAL), Please choose reason not prescribed, below, Disp: , Rfl:         OBJECTIVE     Vital signs reviewed by Josh Perez  /74   Pulse 94   Temp 98.4  F (36.9  C) (Oral)   Resp 20   Ht 1.479 m (4' 10.21\")   Wt 73.1 kg (161 lb 3.2 oz)   SpO2 97%   BMI 33.45 kg/m       Physical Exam   Constitutional: She is oriented to person, place, and time. She appears well-developed and well-nourished. She is cooperative.  Non-toxic appearance. She does not have a sickly appearance. She does not appear ill. No distress.   HENT:   Head: Normocephalic and atraumatic.   Right Ear: Hearing, tympanic membrane, external ear and ear canal normal. Tympanic membrane is not perforated, not erythematous, not retracted and not bulging.   Left Ear: Hearing, tympanic membrane, external ear and ear canal normal. Tympanic membrane is not perforated, not erythematous, not retracted and not bulging.   Nose: Mucosal edema and rhinorrhea present. Right sinus exhibits no maxillary sinus tenderness and no frontal sinus tenderness. Left sinus exhibits no maxillary sinus tenderness and no frontal sinus tenderness.   Mouth/Throat: Mucous membranes are normal. No oropharyngeal exudate, posterior oropharyngeal edema, posterior oropharyngeal erythema or tonsillar abscesses. Tonsils are 0 on the right. Tonsils are 0 on the left. No tonsillar exudate.   Eyes: Pupils are equal, round, and reactive to light. EOM are normal. Right eye exhibits no discharge. Left eye " exhibits no discharge.   Neck: Normal range of motion. Neck supple.   Cardiovascular: Normal rate, regular rhythm, normal heart sounds and intact distal pulses. Exam reveals no gallop and no friction rub.   No murmur heard.  Pulmonary/Chest: Effort normal and breath sounds normal. No respiratory distress. She has no decreased breath sounds. She has no wheezes. She has no rhonchi. She has no rales. She exhibits no tenderness.   Abdominal: Soft. Bowel sounds are normal. She exhibits no distension and no mass. There is no tenderness. There is no guarding.   Lymphadenopathy:     She has no cervical adenopathy.   Neurological: She is alert and oriented to person, place, and time. She has normal reflexes. No cranial nerve deficit.   Skin: Skin is warm and dry. She is not diaphoretic.   Psychiatric: She has a normal mood and affect. Her behavior is normal. Judgment and thought content normal.   Nursing note and vitals reviewed.        Labs:       Medical Decision Making:    Differential Diagnosis:  URI Adult/Peds:  Bronchitis-viral and seasonal allergies        ASSESSMENT    1. Seasonal allergic rhinitis, unspecified trigger    2. Allergic conjunctivitis, bilateral    3. Type 2 diabetes mellitus with hyperglycemia, without long-term current use of insulin (H)        PLAN    Patient presents with 3 days of nasal congestion, clear rhinorrhea and itchy eyes.  Patient is in no acute distress.  Temp is 98.4 in clinic today.  Lung sounds were clear and O2 sats at 97% on RA.  Imaging to rule out pneumonia is not indicated at this time.  Rx for Flonase, and Pataday eye drops.  Patient instructed to continue to monitor blood sugars closely.  Last A1C was 6.6 on 2/11/2019  Rest, Push fluids, vaporizer, elevation of head of bed.  Ibuprofen and or Tylenol for any fever or body aches.  Over the counter cough suppressant- PRN- as discussed.   If symptoms worsen, recheck immediately otherwise follow up with your PCP in 1 week if symptoms  are not improving.  Worrisome symptoms discussed with instructions to go to the ED.  Patient verbalized understanding and agreed with this plan.         Josh Perez  4/29/2019, 11:30 AM

## 2019-04-29 NOTE — PATIENT INSTRUCTIONS
Patient Education     Controlling Allergens: In the Home  Even a clean home can be full of allergens, so take a moment to see what you can do to cut down on allergens in each room of your home. Try to avoid things like cigarette smoke and perfume. They can irritate your eyes, nose, throat, and lungs and make your allergies worse.    Buy an air purifier with a HEPA filter. Look in consumer magazines for recommendations. Avoid vaporizers and humidifiers, since they encourage mold and dust-mite growth.    Use shades or vertical blinds instead of horizontal blinds, which collect dust. Replace drapes with curtains that can be washed regularly.    Enclose mattresses, box springs, and pillows in allergy-proof casings. Use washable blankets and quilts. Avoid feather pillows, down comforters, and wool blankets.    Avoid dust-catching clutter. Have enclosed places to keep books, toys, and clothes. Keep closet doors closed.    Use washable throw rugs wherever possible, or have bare floors.    Put filters over forced-air heating vents. Change the filters regularly.    Keep your car clean. Vacuum the seats and carpets regularly. If you have air conditioning, use it instead of opening the windows.    Keep rain gutters clean. Remove leaves and debris that can grow mold.    Check stored food for spoilage and mold growth. Clean up spills right away.    Don't let wet clothing sit and grow mold. And don't hang clothes outside to dry where they can collect airborne pollen. Dry clothing immediately in a clothes dryer that's vented to the outside.    Install a fan to keep the bathroom well ventilated.        Avoid yard work and pulling weeds. These and other outdoor activities increase your exposure to pollen. If that s not possible, wear a filter mask. When you re done, bathe, wash your hair, and change your clothes.   Date Last Reviewed: 9/1/2016 2000-2018 [employer]. [address] [facility], [place] [address]. All  rights reserved. This information is not intended as a substitute for professional medical care. Always follow your healthcare professional's instructions.

## 2019-05-01 ENCOUNTER — TELEPHONE (OUTPATIENT)
Dept: URGENT CARE | Facility: URGENT CARE | Age: 34
End: 2019-05-01

## 2019-05-01 DIAGNOSIS — H10.13 ALLERGIC CONJUNCTIVITIS, BILATERAL: Primary | ICD-10-CM

## 2019-05-01 RX ORDER — AZELASTINE 1 MG/ML
1 SPRAY, METERED NASAL 2 TIMES DAILY
Qty: 30 ML | Refills: 0 | Status: SHIPPED | OUTPATIENT
Start: 2019-05-01 | End: 2019-06-14

## 2019-05-01 NOTE — TELEPHONE ENCOUNTER
Caller: Unspecified (Today,  9:11 AM)             I have talked to patient and Azelastin has been send to pharmacy.   Matilda Blanca  [facility]   Family Nurse Practitoner

## 2019-05-01 NOTE — TELEPHONE ENCOUNTER
Provider to address the non covered medication.    Kym Kimble RN, [facility] Triage

## 2019-05-01 NOTE — TELEPHONE ENCOUNTER
Reason for call:  Medication   If this is a refill request, has the caller requested the refill from the pharmacy already? N/A  Will the patient be using a [facility]? No  Name of the pharmacy and phone number for the current request: [facility] on [address] [facility] [phone]    Name of the medication requested: eye drops    Other request: the eye drops were not covered and too expensive   Can you call something else for her    Phone number to reach patient:  Home number on file [phone] (home)    Best Time:  any    Can we leave a detailed message on this number?  YES

## 2019-05-03 ENCOUNTER — TELEPHONE (OUTPATIENT)
Dept: URGENT CARE | Facility: URGENT CARE | Age: 34
End: 2019-05-03

## 2019-05-03 NOTE — TELEPHONE ENCOUNTER
.Reason for Call:   prescription    Detailed comments: originally got Rx for an eye drop that was not covered by insurance, called and left message for [facility] and asked for an alternative, when she went to pharmacy, it was another nasal spray/ Patient is asking for an eye drop that is covered; *eye drops were discussed at   office visit;     [facility] [phone]    Phone Number Patient can be reached at: Home number on file [phone] (home)    Best Time: anytime    Can we leave a detailed message on this number? YES    Call taken on 5/3/2019 at 10:12 AM by Amanda Doan

## 2019-05-03 NOTE — TELEPHONE ENCOUNTER
I talked to patient and informed her the message from provider. Patient said she will try the OTC eye drop.      Danial Hernandez

## 2019-05-03 NOTE — TELEPHONE ENCOUNTER
Pataday eye drops are often not covered by insurance. There really are no other prescription alternatives. There is an Over the Counter allergy eye drop called Ketotifen that works well. She can purchase and try this one.  Emily Christian PA-C

## 2019-05-03 NOTE — TELEPHONE ENCOUNTER
"Talked to patient regarding eye drop not coverage by insurance. I already Informed patient with the below message from provider. Please see copy/paste below message.    Danial Hernandez    \"Pataday eye drops are often not covered by insurance. There really are no other prescription alternatives. There is an Over the Counter allergy eye drop called Ketotifen that works well. She can purchase and try this one.\"     Emily Christian PA-C  "

## 2019-05-03 NOTE — TELEPHONE ENCOUNTER
[facility] provider(s) to please advise on need for alternative eye drops - it appears a nasal spray was sent in?     Emily Cardoza BSN, RN

## 2019-06-05 DIAGNOSIS — E11.65 TYPE 2 DIABETES MELLITUS WITH HYPERGLYCEMIA, WITHOUT LONG-TERM CURRENT USE OF INSULIN (H): ICD-10-CM

## 2019-06-05 NOTE — TELEPHONE ENCOUNTER
"Requested Prescriptions   Pending Prescriptions Disp Refills     metFORMIN (GLUCOPHAGE) 500 MG tablet [Pharmacy Med Name: METFORMIN 500MG TABLETS]  Last Written Prescription Date:  12/05/18  Last Fill Quantity: 180,  # refills: 1   Last Office Visit with G, DANA or [facility] prescribing provider:  12/05/18Jhoan   Future Office Visit:      180 tablet 0     Sig: TAKE 1 TABLET(500 MG) BY MOUTH TWICE DAILY WITH MEALS       Biguanide Agents Failed - 6/5/2019  1:30 PM        Failed - Recent (6 mo) or future (30 days) visit within the authorizing provider's specialty     Patient had office visit in the last 6 months or has a visit in the next 30 days with authorizing provider or within the authorizing provider's specialty.  See \"Patient Info\" tab in inbasket, or \"Choose Columns\" in Meds & Orders section of the refill encounter.            Passed - Blood pressure less than 140/90 in past 6 months     BP Readings from Last 3 Encounters:   04/29/19 113/74   12/05/18 106/74   11/30/18 106/74                 Passed - Patient has documented LDL within the past 12 mos.     Recent Labs   Lab Test 02/11/19  0818   LDL 74             Passed - Patient has had a Microalbumin in the past 15 mos.     Recent Labs   Lab Test 02/11/19  0823   MICROL 31   UMALCR 9.75             Passed - Patient is age 10 or older        Passed - Patient has documented A1c within the specified period of time.     If HgbA1C is 8 or greater, it needs to be on file within the past 3 months.  If less than 8, must be on file within the past 6 months.     Recent Labs   Lab Test 02/11/19  0818   A1C 6.6*             Passed - Patient's CR is NOT>1.4 OR Patient's EGFR is NOT<45 within past 12 mos.     Recent Labs   Lab Test 11/30/18  1546   GFRESTIMATED >90   GFRESTBLACK >90       Recent Labs   Lab Test 11/30/18  1546   CR 0.72             Passed - Patient does NOT have a diagnosis of CHF.        Passed - Medication is active on med list        Passed - Patient is " not pregnant        Passed - Patient has not had a positive pregnancy test within the past 12 mos.

## 2019-06-10 NOTE — TELEPHONE ENCOUNTER
Team please contact patient and assist with scheduling diabetes visit.    Medication is being filled for 1 time refill only due to:   Due for diabetes office visit      Johnna Triplett RN

## 2019-06-11 NOTE — TELEPHONE ENCOUNTER
Scheduled patient for an appt with Dr Gottlieb on 6/14/19.  Charity Flores MA/  For Teams [employer] and [facility]

## 2019-06-14 ENCOUNTER — OFFICE VISIT (OUTPATIENT)
Dept: FAMILY MEDICINE | Facility: CLINIC | Age: 34
End: 2019-06-14
Payer: COMMERCIAL

## 2019-06-14 VITALS
HEART RATE: 87 BPM | DIASTOLIC BLOOD PRESSURE: 83 MMHG | SYSTOLIC BLOOD PRESSURE: 120 MMHG | BODY MASS INDEX: 32.86 KG/M2 | WEIGHT: 163 LBS | HEIGHT: 59 IN | TEMPERATURE: 98.1 F | OXYGEN SATURATION: 98 %

## 2019-06-14 DIAGNOSIS — E11.9 TYPE 2 DIABETES MELLITUS WITHOUT COMPLICATION, WITHOUT LONG-TERM CURRENT USE OF INSULIN (H): Primary | ICD-10-CM

## 2019-06-14 DIAGNOSIS — Z23 ENCOUNTER FOR IMMUNIZATION: ICD-10-CM

## 2019-06-14 PROCEDURE — 90471 IMMUNIZATION ADMIN: CPT | Performed by: PREVENTIVE MEDICINE

## 2019-06-14 PROCEDURE — 90732 PPSV23 VACC 2 YRS+ SUBQ/IM: CPT | Performed by: PREVENTIVE MEDICINE

## 2019-06-14 PROCEDURE — 99213 OFFICE O/P EST LOW 20 MIN: CPT | Mod: 25 | Performed by: PREVENTIVE MEDICINE

## 2019-06-14 ASSESSMENT — PAIN SCALES - GENERAL: PAINLEVEL: NO PAIN (0)

## 2019-06-14 ASSESSMENT — MIFFLIN-ST. JEOR: SCORE: 1337.05

## 2019-06-14 NOTE — NURSING NOTE
Screening Questionnaire for Adult Immunization    Are you sick today?   No   Do you have allergies to medications, food, a vaccine component or latex?   No   Have you ever had a serious reaction after receiving a vaccination?   No   Do you have a long-term health problem with heart disease, lung disease, asthma, kidney disease, metabolic disease (e.g. diabetes), anemia, or other blood disorder?   No   Do you have cancer, leukemia, HIV/AIDS, or any other immune system problem?   No   In the past 3 months, have you taken medications that affect  your immune system, such as prednisone, other steroids, or anticancer drugs; drugs for the treatment of rheumatoid arthritis, Crohn s disease, or psoriasis; or have you had radiation treatments?   No   Have you had a seizure, or a brain or other nervous system problem?   No   During the past year, have you received a transfusion of blood or blood     products, or been given immune (gamma) globulin or antiviral drug?   No   For women: Are you pregnant or is there a chance you could become        pregnant during the next month?   No   Have you received any vaccinations in the past 4 weeks?   No     Immunization questionnaire answers were all negative.        Per orders of Dr. Gottlieb, injection of PSV 23 given by Jayla Castellano. Patient instructed to remain in clinic for 15 minutes afterwards, and to report any adverse reaction to me immediately.       Screening performed by Jayla Castellano on 6/14/2019 at 5:02 PM.

## 2019-06-14 NOTE — PATIENT INSTRUCTIONS
At [facility], we strive to deliver an exceptional experience to you, every time we see you.  If you receive a survey in the mail, please send us back your thoughts. We really do value your feedback.    Based on your medical history, these are the current health maintenance/preventive care services that you are due for (some may have been done at this visit.)  Health Maintenance Due   Topic Date Due     EYE EXAM  1985     PREVENTIVE CARE VISIT  03/11/2012     PHQ-2  01/01/2019         Suggested websites for health information:  Www.[facility].org : Up to date and easily searchable information on multiple topics.  Www.[employer].gov : medication info, interactive tutorials, watch real surgeries online  Www.familydoctor.org : good info from the Academy of Family Physicians  Www.cdc.gov : public health info, travel advisories, epidemics (H1N1)  Www.aap.org : children's health info, normal development, vaccinations  Www.health.[facility].[place].us : [place] dept of health, public health issues in [place], N1N1    Your care team:                            Family Medicine Internal Medicine   MD Juan Manuel Washington MD Shantel Branch-Fleming, MD Katya Georgiev PA-C Nam Ho, MD Pediatrics   CARISA Ortiz, CHICO MONTERO CNP   MD Hanh Bartlett MD Deborah Mielke, MD Kim Thein, APRN CNP      Clinic hours: Monday - Thursday 7 am-7 pm; Fridays 7 am-5 pm.   Urgent care: Monday - Friday 11 am-9 pm; Saturday and Sunday 9 am-5 pm.  Pharmacy : Monday -Thursday 8 am-8 pm; Friday 8 am-6 pm; Saturday and Sunday 9 am-5 pm.     Clinic: [phone]   Pharmacy: [phone]

## 2019-06-14 NOTE — PROGRESS NOTES
SUBJECTIVE:   Becky Hernandez is a 34 year old female who presents to clinic today for the following   health issues:      Diabetes Follow-up      How often are you checking your blood sugar? Not at all    What time of day are you checking your blood sugars (select all that apply)? Not at all    Have you had any blood sugars above 200?  No    Have you had any blood sugars below 70?  No    What symptoms do you notice when your blood sugar is low?  None    What concerns do you have today about your diabetes? None     Do you have any of these symptoms? (Select all that apply)  Weight gain and No numbness or tingling in feet.  No redness, sores or blisters on feet.  No complaints of excessive thirst.  No reports of blurry vision.  No significant changes to weight.     Have you had a diabetic eye exam in the last 12 months? No    BP Readings from Last 2 Encounters:   06/14/19 120/83   04/29/19 113/74     Hemoglobin A1C (%)   Date Value   02/11/2019 6.6 (H)   11/30/2018 7.6 (H)     LDL Cholesterol Calculated (mg/dL)   Date Value   02/11/2019 74       Diabetes Management Resources    Amount of exercise or physical activity: None    Problems taking medications regularly: No    Medication side effects: fatigue after meals    Diet: diabetic      Additional history: as documented    Reviewed  and updated as needed this visit by clinical staff  Tobacco  Allergies  Meds  Problems  Med Hx  Surg Hx  Fam Hx  Soc Hx          Reviewed and updated as needed this visit by Provider  Tobacco  Allergies  Meds  Problems  Med Hx  Surg Hx  Fam Hx         Patient Active Problem List   Diagnosis     Type 2 diabetes mellitus with hyperglycemia, without long-term current use of insulin (H)     Hyperlipidemia LDL goal <100     Past Surgical History:   Procedure Laterality Date     DILATION AND CURETTAGE SUCTION N/A 10/23/2014    Procedure: DILATION AND CURETTAGE SUCTION;  Surgeon: Ulisses Edwards MD;  Location:  OR       INSERTION INTRAUTERINE DEVICE  2012    Mirena     HC REMOVE INTRAUTERINE DEVICE  2014       Social History     Tobacco Use     Smoking status: Never Smoker     Smokeless tobacco: Never Used   Substance Use Topics     Alcohol use: No     Family History   Problem Relation Age of Onset     Asthma No family hx of      C.A.D. No family hx of      Diabetes No family hx of      Hypertension No family hx of      Cerebrovascular Disease No family hx of      Breast Cancer No family hx of      Cancer - colorectal No family hx of      Prostate Cancer No family hx of          Current Outpatient Medications   Medication Sig Dispense Refill     etonogestrel (NEXPLANON) 68 MG IMPL One device, subdermally, for up to 3 years. 1 each 0     metFORMIN (GLUCOPHAGE) 500 MG tablet TAKE 1 TABLET(500 MG) BY MOUTH TWICE DAILY WITH MEALS 180 tablet 1     ASPIRIN NOT PRESCRIBED (INTENTIONAL) Please choose reason not prescribed, below (Patient not taking: Reported on 6/14/2019) 1 each 0     fluticasone (FLONASE) 50 MCG/ACT nasal spray Spray 2 sprays into both nostrils daily 15.8 mL 1     olopatadine (PATADAY) 0.2 % ophthalmic solution Place 1 drop into both eyes daily (Patient not taking: Reported on 6/14/2019) 1 Bottle 0     STATIN NOT PRESCRIBED (INTENTIONAL) Please choose reason not prescribed, below (Patient not taking: Reported on 6/14/2019)       No Known Allergies  Recent Labs   Lab Test 02/11/19  0818 11/30/18  1546   A1C 6.6* 7.6*   LDL 74  --    HDL 40*  --    TRIG 216*  --    ALT  --  46   CR  --  0.72   GFRESTIMATED  --  >90   GFRESTBLACK  --  >90   POTASSIUM  --  3.8   TSH  --  1.06      BP Readings from Last 3 Encounters:   06/14/19 120/83   04/29/19 113/74   12/05/18 106/74    Wt Readings from Last 3 Encounters:   06/14/19 73.9 kg (163 lb)   04/29/19 73.1 kg (161 lb 3.2 oz)   12/05/18 73 kg (161 lb)                    ROS:  Constitutional, HEENT, cardiovascular, pulmonary, gi and gu systems are negative, except as otherwise  "noted.    OBJECTIVE:                                                    /83 (BP Location: Left arm, Patient Position: Chair, Cuff Size: Adult Regular)   Pulse 87   Temp 98.1  F (36.7  C) (Oral)   Ht 1.486 m (4' 10.5\")   Wt 73.9 kg (163 lb)   SpO2 98%   BMI 33.49 kg/m    Body mass index is 33.49 kg/m .      GENERAL APPEARANCE: healthy, alert and no distress  EYES: Eyes grossly normal to inspection and conjunctivae and sclerae normal  NECK: no adenopathy and trachea midline and normal to palpation  RESP: lungs clear to auscultation - no rales, rhonchi or wheezes  CV: regular rates and rhythm, normal S1 S2, no S3 or S4 and no murmur, click or rub  ABDOMEN: soft, non-tender and no rebound or guarding   MS: extremities normal- no gross deformities noted and peripheral pulses normal  SKIN: no suspicious lesions or rashes  NEURO: Normal strength and tone, mentation intact and speech normal  PSYCH: mentation appears normal    Diagnostic test results:  Diagnostic Test Results:  Labs reviewed in Epic  Results for orders placed or performed in visit on 02/11/19   Hemoglobin A1c   Result Value Ref Range    Hemoglobin A1C 6.6 (H) 0 - 5.6 %   Lipid panel reflex to direct LDL Fasting   Result Value Ref Range    Cholesterol 157 <200 mg/dL    Triglycerides 216 (H) <150 mg/dL    HDL Cholesterol 40 (L) >49 mg/dL    LDL Cholesterol Calculated 74 <100 mg/dL    Non HDL Cholesterol 117 <130 mg/dL   Vitamin D Deficiency   Result Value Ref Range    Vitamin D Deficiency screening 22 20 - 75 ug/L   Albumin Random Urine Quantitative with Creat Ratio   Result Value Ref Range    Creatinine Urine 316 mg/dL    Albumin Urine mg/L 31 mg/L    Albumin Urine mg/g Cr 9.75 0 - 25 mg/g Cr        ASSESSMENT/PLAN:                                                    1. Type 2 diabetes mellitus with hyperglycemia, without long-term current use of insulin (H)  -HbA1C is at goal at 6.6, improved from 7.6  - metFORMIN (GLUCOPHAGE) 500 MG tablet; TAKE " 1 TABLET(500 MG) BY MOUTH TWICE DAILY WITH MEALS  Dispense: 180 tablet; Refill: 1  - ADMIN: Vaccine, Initial (37298)  -due for eye exam     Continue medications the same.    Periodic blood sugar testing.  Regular foot checks  Limit carbohydrates and sweets in diet  Update eye exam annually   Regular exercise, 150 minutes per week  Hypoglycemia precautions       2. Encounter for immunization  -Pneumococcal vaccination today   - ADMIN: Vaccine, Initial (03246)  - ADMIN 1st VACCINE      Follow up with Provider - 6 months      Marcella Gottlieb MD MPH    [facility]

## 2019-07-02 DIAGNOSIS — J30.2 SEASONAL ALLERGIC RHINITIS, UNSPECIFIED TRIGGER: ICD-10-CM

## 2019-07-02 RX ORDER — FLUTICASONE PROPIONATE 50 MCG
2 SPRAY, SUSPENSION (ML) NASAL DAILY
Qty: 15.8 ML | Refills: 3 | Status: SHIPPED | OUTPATIENT
Start: 2019-07-02 | End: 2021-01-12

## 2019-07-02 NOTE — TELEPHONE ENCOUNTER
Routing refill request to provider for review/approval because:  Last filled by Urgent Care provider. Sent to last family practice seen to see if willing to fill.    Kym Kimble RN, [facility]

## 2019-07-02 NOTE — TELEPHONE ENCOUNTER
"Requested Prescriptions   Pending Prescriptions Disp Refills     fluticasone (FLONASE) 50 MCG/ACT nasal spray 15.8 mL 1     Sig: Spray 2 sprays into both nostrils daily   Last Written Prescription Date:  4-29-19  Last Fill Quantity: 15.8ml,  # refills: 1   Last office visit: 6/14/2019 with prescribing provider:  6-14-19   Future Office Visit:        Inhaled Steroids Protocol Passed - 7/2/2019 10:53 AM        Passed - Patient is age 12 or older        Passed - Recent (12 mo) or future (30 days) visit within the authorizing provider's specialty     Patient had office visit in the last 12 months or has a visit in the next 30 days with authorizing provider or within the authorizing provider's specialty.  See \"Patient Info\" tab in inbasket, or \"Choose Columns\" in Meds & Orders section of the refill encounter.              Passed - Medication is active on med list          "

## 2019-07-03 NOTE — TELEPHONE ENCOUNTER
This writer attempted to contact patient on 07/03/19      Reason for call refills and left message.      If patient calls back:   Relay message refilled medication, flonase, (read verbatim), document that pt called and close encounter        Vernell Queen MA

## 2019-11-05 ENCOUNTER — OFFICE VISIT (OUTPATIENT)
Dept: OBGYN | Facility: CLINIC | Age: 34
End: 2019-11-05
Payer: COMMERCIAL

## 2019-11-05 VITALS
OXYGEN SATURATION: 96 % | WEIGHT: 163.6 LBS | BODY MASS INDEX: 33.61 KG/M2 | DIASTOLIC BLOOD PRESSURE: 72 MMHG | HEART RATE: 74 BPM | SYSTOLIC BLOOD PRESSURE: 102 MMHG

## 2019-11-05 DIAGNOSIS — Z30.46 ENCOUNTER FOR NEXPLANON REMOVAL: ICD-10-CM

## 2019-11-05 DIAGNOSIS — Z30.011 OCP (ORAL CONTRACEPTIVE PILLS) INITIATION: Primary | ICD-10-CM

## 2019-11-05 PROCEDURE — 99213 OFFICE O/P EST LOW 20 MIN: CPT | Mod: 25 | Performed by: OBSTETRICS & GYNECOLOGY

## 2019-11-05 PROCEDURE — 11982 REMOVE DRUG IMPLANT DEVICE: CPT | Performed by: OBSTETRICS & GYNECOLOGY

## 2019-11-05 NOTE — PROGRESS NOTES
Becky Castrejon is a 34 year old female  who presents today for consultation regarding contraceptive options.  She has been on the Nexplanon for the past 2 years and she has not had any bleeding with it.  She has had some cramping, but without bleeding.  She desires to have regular cycles and she would like to have an option that might give her that.    Together, we reviewed the contraceptive options available.  We reviewed the success rates and the pregnancy rates of the options.  These include but are not limited to the male and female sterilization procedures, barrier methods and spermicides. Hormonal methods were reviewed: Nexplanon, Mirena IUD (as well as the ParaGard IUD, although not hormonal), Injections, Rings, Oral contraceptives. Natural family planning also discussed.  We reviewed the R/B/A for abstinence, OCP, Patch, Nuva Ring, Nexplanon, Depo-Provera, IUD, condoms, and permanent sterilization.  I reviewed with her, the potential side effects of hormonal contraception including but not limited to thromboembolic events, hypertension, breakthrough bleeding, GI upset, and headaches.  Proper usage was also reviewed. We also reviewed need for back-up contraception for the first month of hormonal methods.   Questions seemed to be answered.      Past Medical History:   Diagnosis Date     NO ACTIVE PROBLEMS      Past Surgical History:   Procedure Laterality Date     DILATION AND CURETTAGE SUCTION N/A 10/23/2014    Procedure: DILATION AND CURETTAGE SUCTION;  Surgeon: Ulisses Edwards MD;  Location: [facility] OR     HC INSERTION INTRAUTERINE DEVICE      Mirena     HC REMOVE INTRAUTERINE DEVICE       OB History    Para Term  AB Living   6 5 5 0 1 5   SAB TAB Ectopic Multiple Live Births   0 0 0 0 5      # Outcome Date GA Lbr Rikki/2nd Weight Sex Delivery Anes PTL Lv   6 Term 17 38w5d 03:00 / 00:10 3.402 kg (7 lb 8 oz) F  None  BELEM      Name: Sierra      Apgar1: 8  Apgar5: 9   5 Term  10/02/15 40w0d  3.629 kg (8 lb) M    BELEM      Name: Winston Whyte AB 10/23/14 9w0d    AB, MISSED         Birth Comments: D&C   3 Term 07    M   N BELEM   2 Term 02/15/04    M   Y BELEM   1 Term 00    M   N BELEM        No Known Allergies       Current Outpatient Medications   Medication Sig Dispense Refill     etonogestrel (NEXPLANON) 68 MG IMPL One device, subdermally, for up to 3 years. 1 each 0     metFORMIN (GLUCOPHAGE) 500 MG tablet TAKE 1 TABLET(500 MG) BY MOUTH TWICE DAILY WITH MEALS 180 tablet 1     ASPIRIN NOT PRESCRIBED (INTENTIONAL) Please choose reason not prescribed, below (Patient not taking: Reported on 2019) 1 each 0     fluticasone (FLONASE) 50 MCG/ACT nasal spray Spray 2 sprays into both nostrils daily (Patient not taking: Reported on 2019) 15.8 mL 3     STATIN NOT PRESCRIBED (INTENTIONAL) Please choose reason not prescribed, below (Patient not taking: Reported on 2019)         Social History     Socioeconomic History     Marital status: Single     Spouse name: Deep     Number of children: 5     Years of education: Not on file     Highest education level: Not on file   Occupational History     Occupation:    Social Needs     Financial resource strain: Not on file     Food insecurity:     Worry: Not on file     Inability: Not on file     Transportation needs:     Medical: Not on file     Non-medical: Not on file   Tobacco Use     Smoking status: Never Smoker     Smokeless tobacco: Never Used   Substance and Sexual Activity     Alcohol use: No     Drug use: No     Sexual activity: Yes     Partners: Male   Lifestyle     Physical activity:     Days per week: Not on file     Minutes per session: Not on file     Stress: Not on file   Relationships     Social connections:     Talks on phone: Not on file     Gets together: Not on file     Attends Druze service: Not on file     Active member of club or organization: Not on file     Attends meetings of  clubs or organizations: Not on file     Relationship status: Not on file     Intimate partner violence:     Fear of current or ex partner: Not on file     Emotionally abused: Not on file     Physically abused: Not on file     Forced sexual activity: Not on file   Other Topics Concern     Parent/sibling w/ CABG, MI or angioplasty before 65F 55M? Not Asked      Service No     Blood Transfusions No     Caffeine Concern No     Occupational Exposure No     Hobby Hazards No     Sleep Concern No     Stress Concern No     Weight Concern No     Special Diet No     Back Care No     Exercise No     Bike Helmet Not Asked     Comment: she doesn't ride a bike     Seat Belt Yes     Self-Exams No   Social History Narrative    From [place] [facility] to [place] in [place].    [place].      Family History   Problem Relation Age of Onset     Asthma No family hx of      C.A.D. No family hx of      Diabetes No family hx of      Hypertension No family hx of      Cerebrovascular Disease No family hx of      Breast Cancer No family hx of      Cancer - colorectal No family hx of      Prostate Cancer No family hx of        Review of Systems:  10 point ROS of systems including Constitutional, Eyes, Respiratory, Cardiovascular, Gastroenterology, Genitourinary, Integumentary, Muscularskeletal, Psychiatric were all negative except for pertinent positives noted in my HPI and in the PMH.      EXAM:  /72 (BP Location: Right arm, Cuff Size: Adult Large)   Pulse 74   Wt 74.2 kg (163 lb 9.6 oz)   LMP  (LMP Unknown)   SpO2 96%   Breastfeeding? No   BMI 33.61 kg/m    Body mass index is 33.61 kg/m .  General:  WNWD female, NAD  Alert  Oriented x 3  Gait:  Normal  Skin:  Normal skin turgor  HEENT:  NC/AT, EOMI  Neck:  Symmetrical, no masses  Lungs:  Good respiratory effort   Abdomen:  Non-tender, non-distended.  Pelvic exam:  Declined   Extremities:  No clubbing, cyanosis or edema      ASSESSMENT:  Contraceptive management/Family  Planning.      PLAN:  She does not like NOT having her menses.    She has diabetes so this complicates her options.  She desires to use the OCPs after the discussion.  She needs to make sure her glucoses are well controlled.   Prescription for Lo/Ovral is written.    Schedule for annual exam and OCP check.     TT  20 min face to face, in addition to the procedure   CT  Greater than 80% as noted in the HPI and in the plan.     Ulisses Edwards MD              PROCEDURE NOTE:  Becky Castrejon was counseled about removal. She voiced her understanding and informed consent was obtained.  Patient was placed in a supine position. Left arm was flexed at the elbow and externally rotated. The implant was located by palpation and marked at the end closest to the elbow with a sterile marker. The implant was palpated by both myself and the patient.    The area was cleaned and antiseptic applied. I injected sufficient anesthetic, 1cc of 1% Lidocaine just underneath the end of the implant closest to the elbow.  I pressed down on the end of the implant closest to the axilla and made a 2 mm incision in the arm at the tip of the implant closest to the elbow. I gently pushed the implant toward the incision until the tip was visible and grasped the implant with sterile mosquito forceps and gently removed it.   The incision was closed with a butterfly closure and an adhesive bandage applied. A sterile gauze with pressure bandage was also applied.  Aseptic conditions were maintained throughout the procedure. The patient tolerated the procedure well.  No apparent complications.     Ulisses Edwards MD

## 2019-11-06 ENCOUNTER — HEALTH MAINTENANCE LETTER (OUTPATIENT)
Age: 34
End: 2019-11-06

## 2019-12-30 DIAGNOSIS — E11.9 TYPE 2 DIABETES MELLITUS WITHOUT COMPLICATION, WITHOUT LONG-TERM CURRENT USE OF INSULIN (H): ICD-10-CM

## 2019-12-30 NOTE — TELEPHONE ENCOUNTER
"Requested Prescriptions   Pending Prescriptions Disp Refills     metFORMIN (GLUCOPHAGE) 500 MG tablet [Pharmacy Med Name: METFORMIN 500MG TABLETS]  Last Written Prescription Date:  06/14/19  Last Fill Quantity: 180,  # refills: 1   Last Office Visit with LUDMILA, DANA or [facility] prescribing provider:  06/14/19Jhoan   Future Office Visit:    180 tablet 1     Sig: TAKE 1 TABLET(500 MG) BY MOUTH TWICE DAILY WITH MEALS       Biguanide Agents Failed - 12/30/2019  1:16 PM        Failed - Patient has documented A1c within the specified period of time.     If HgbA1C is 8 or greater, it needs to be on file within the past 3 months.  If less than 8, must be on file within the past 6 months.     Recent Labs   Lab Test 02/11/19  0818   A1C 6.6*             Failed - Patient's CR is NOT>1.4 OR Patient's EGFR is NOT<45 within past 12 mos.     Recent Labs   Lab Test 11/30/18  1546   GFRESTIMATED >90   GFRESTBLACK >90       Recent Labs   Lab Test 11/30/18  1546   CR 0.72             Failed - Recent (6 mo) or future (30 days) visit within the authorizing provider's specialty     Patient had office visit in the last 6 months or has a visit in the next 30 days with authorizing provider or within the authorizing provider's specialty.  See \"Patient Info\" tab in inbasket, or \"Choose Columns\" in Meds & Orders section of the refill encounter.            Passed - Blood pressure less than 140/90 in past 6 months     BP Readings from Last 3 Encounters:   11/05/19 102/72   06/14/19 120/83   04/29/19 113/74                 Passed - Patient has documented LDL within the past 12 mos.     Recent Labs   Lab Test 02/11/19  0818   LDL 74             Passed - Patient has had a Microalbumin in the past 15 mos.     Recent Labs   Lab Test 02/11/19  0823   MICROL 31   UMALCR 9.75             Passed - Patient is age 10 or older        Passed - Patient does NOT have a diagnosis of CHF.        Passed - Medication is active on med list        Passed - Patient is " not pregnant        Passed - Patient has not had a positive pregnancy test within the past 12 mos.

## 2019-12-30 NOTE — LETTER
[address] [facility] [place] [address]          01/03/20      Dear Becky Hernandez        At [facility] we care about your health and are committed to providing quality patient care. Regular appointments are a vital part of the care and management of your health and can help prevent many of the complications that can occur.      It has come to our attention that you are due for an office visit to address the refill request. Please call [facility] at [phone] soon to schedule your appointment.    If you have transferred care to another clinic please call to inform us so that we do not continue to send you reminder letters.      Sincerely,      [facility] Care Team

## 2020-01-03 NOTE — TELEPHONE ENCOUNTER
Routing refill request to provider for review/approval because:  Labs not current:  A1c 6.6 on 02/11/2019, EGFR drawn on 11/30/2018    Niru Shea RN

## 2020-01-03 NOTE — TELEPHONE ENCOUNTER
This writer attempted to contact Patient on 01/03/20      Reason for call needs an office visit with labs and called Home # rings and rings, called mobile # spouse # and did not leave a message, sent a letter.      If patient calls back:   Schedule Office Visit appointment within 1 week with primary care, document that pt called and close encounter         Charity Flores MA

## 2020-01-14 ENCOUNTER — OFFICE VISIT (OUTPATIENT)
Dept: URGENT CARE | Facility: URGENT CARE | Age: 35
End: 2020-01-14
Payer: COMMERCIAL

## 2020-01-14 VITALS
SYSTOLIC BLOOD PRESSURE: 121 MMHG | DIASTOLIC BLOOD PRESSURE: 82 MMHG | BODY MASS INDEX: 33.86 KG/M2 | OXYGEN SATURATION: 97 % | HEART RATE: 82 BPM | TEMPERATURE: 98.3 F | RESPIRATION RATE: 16 BRPM | WEIGHT: 164.8 LBS

## 2020-01-14 DIAGNOSIS — N30.01 ACUTE CYSTITIS WITH HEMATURIA: Primary | ICD-10-CM

## 2020-01-14 DIAGNOSIS — R82.90 NONSPECIFIC FINDING ON EXAMINATION OF URINE: ICD-10-CM

## 2020-01-14 DIAGNOSIS — R30.0 DYSURIA: ICD-10-CM

## 2020-01-14 LAB
ALBUMIN UR-MCNC: NEGATIVE MG/DL
APPEARANCE UR: ABNORMAL
BACTERIA #/AREA URNS HPF: ABNORMAL /HPF
BILIRUB UR QL STRIP: NEGATIVE
COLOR UR AUTO: YELLOW
GLUCOSE UR STRIP-MCNC: NEGATIVE MG/DL
HGB UR QL STRIP: ABNORMAL
KETONES UR STRIP-MCNC: NEGATIVE MG/DL
LEUKOCYTE ESTERASE UR QL STRIP: ABNORMAL
NITRATE UR QL: POSITIVE
NON-SQ EPI CELLS #/AREA URNS LPF: ABNORMAL /LPF
PH UR STRIP: 6 PH (ref 5–7)
RBC #/AREA URNS AUTO: ABNORMAL /HPF
SOURCE: ABNORMAL
SP GR UR STRIP: 1.02 (ref 1–1.03)
UROBILINOGEN UR STRIP-ACNC: 0.2 EU/DL (ref 0.2–1)
WBC #/AREA URNS AUTO: >100 /HPF

## 2020-01-14 PROCEDURE — 81001 URINALYSIS AUTO W/SCOPE: CPT | Performed by: PHYSICIAN ASSISTANT

## 2020-01-14 PROCEDURE — 87186 SC STD MICRODIL/AGAR DIL: CPT | Performed by: PHYSICIAN ASSISTANT

## 2020-01-14 PROCEDURE — 99213 OFFICE O/P EST LOW 20 MIN: CPT | Performed by: PHYSICIAN ASSISTANT

## 2020-01-14 PROCEDURE — 87086 URINE CULTURE/COLONY COUNT: CPT | Performed by: PHYSICIAN ASSISTANT

## 2020-01-14 PROCEDURE — 87088 URINE BACTERIA CULTURE: CPT | Performed by: PHYSICIAN ASSISTANT

## 2020-01-14 RX ORDER — NITROFURANTOIN 25; 75 MG/1; MG/1
100 CAPSULE ORAL 2 TIMES DAILY
Qty: 10 CAPSULE | Refills: 0 | Status: SHIPPED | OUTPATIENT
Start: 2020-01-14 | End: 2020-07-16

## 2020-01-14 ASSESSMENT — ENCOUNTER SYMPTOMS
DYSURIA: 1
FEVER: 0
BACK PAIN: 0
CONSTITUTIONAL NEGATIVE: 1
GASTROINTESTINAL NEGATIVE: 1
MUSCULOSKELETAL NEGATIVE: 1

## 2020-01-14 NOTE — PROGRESS NOTES
SUBJECTIVE:   Becky Hernandez is a 34 year old female presenting with a chief complaint of   Chief Complaint   Patient presents with     UTI     Dysuria x1 week       She is an established patient of [facility].    UTI    Onset of symptoms was 7day(s).  Course of illness is waxing and waning  Severity moderate  Current and associated symptoms dysuria, frequency, urgency and burning  Treatment and measures tried Cranberry juice  Predisposing factors include none  Patient denies rigors, flank pain, temperature > 101 degrees F., vomiting, taking Coumadin, GFR less than 30 within the last year and vaginal discharge            Review of Systems   Constitutional: Negative.  Negative for fever.   Gastrointestinal: Negative.    Genitourinary: Positive for dysuria.   Musculoskeletal: Negative.  Negative for back pain.   All other systems reviewed and are negative.      Past Medical History:   Diagnosis Date     NO ACTIVE PROBLEMS      Family History   Problem Relation Age of Onset     Asthma No family hx of      C.A.D. No family hx of      Diabetes No family hx of      Hypertension No family hx of      Cerebrovascular Disease No family hx of      Breast Cancer No family hx of      Cancer - colorectal No family hx of      Prostate Cancer No family hx of      Current Outpatient Medications   Medication Sig Dispense Refill     nitroFURantoin macrocrystal-monohydrate (MACROBID) 100 MG capsule Take 1 capsule (100 mg) by mouth 2 times daily for 5 days 10 capsule 0     ASPIRIN NOT PRESCRIBED (INTENTIONAL) Please choose reason not prescribed, below (Patient not taking: Reported on 6/14/2019) 1 each 0     etonogestrel (NEXPLANON) 68 MG IMPL One device, subdermally, for up to 3 years. (Patient not taking: Reported on 1/14/2020) 1 each 0     fluticasone (FLONASE) 50 MCG/ACT nasal spray Spray 2 sprays into both nostrils daily (Patient not taking: Reported on 11/5/2019) 15.8 mL 3     metFORMIN (GLUCOPHAGE) 500 MG tablet TAKE 1 TABLET(500  MG) BY MOUTH TWICE DAILY WITH MEALS (Patient not taking: Reported on 1/14/2020) 180 tablet 1     norgestrel-ethinyl estradiol (LO/OVRAL) 0.3-30 MG-MCG tablet Take 1 tablet by mouth daily (Patient not taking: Reported on 1/14/2020) 84 tablet 0     STATIN NOT PRESCRIBED (INTENTIONAL) Please choose reason not prescribed, below (Patient not taking: Reported on 6/14/2019)       Social History     Tobacco Use     Smoking status: Never Smoker     Smokeless tobacco: Never Used   Substance Use Topics     Alcohol use: No       OBJECTIVE  /82   Pulse 82   Temp 98.3  F (36.8  C) (Oral)   Resp 16   Wt 74.8 kg (164 lb 12.8 oz)   SpO2 97%   BMI 33.86 kg/m      Physical Exam  Constitutional:       Appearance: Normal appearance. She is obese.   Eyes:      Extraocular Movements: Extraocular movements intact.      Conjunctiva/sclera: Conjunctivae normal.   Cardiovascular:      Rate and Rhythm: Normal rate and regular rhythm.      Pulses: Normal pulses.      Heart sounds: Normal heart sounds.   Pulmonary:      Effort: Pulmonary effort is normal.      Breath sounds: Normal breath sounds.   Abdominal:      Tenderness: There is no right CVA tenderness or left CVA tenderness.   Skin:     Capillary Refill: Capillary refill takes less than 2 seconds.   Neurological:      General: No focal deficit present.      Mental Status: She is alert.   Psychiatric:         Mood and Affect: Mood normal.         Labs:  Results for orders placed or performed in visit on 01/14/20 (from the past 24 hour(s))   UA reflex to Microscopic and Culture   Result Value Ref Range    Color Urine Yellow     Appearance Urine Cloudy     Glucose Urine Negative NEG^Negative mg/dL    Bilirubin Urine Negative NEG^Negative    Ketones Urine Negative NEG^Negative mg/dL    Specific Gravity Urine 1.025 1.003 - 1.035    Blood Urine Trace (A) NEG^Negative    pH Urine 6.0 5.0 - 7.0 pH    Protein Albumin Urine Negative NEG^Negative mg/dL    Urobilinogen Urine 0.2 0.2 - 1.0  EU/dL    Nitrite Urine Positive (A) NEG^Negative    Leukocyte Esterase Urine Large (A) NEG^Negative    Source Midstream Urine    Urine Microscopic   Result Value Ref Range    WBC Urine >100 (A) OTO5^0 - 5 /HPF    RBC Urine 5-10 (A) OTO2^O - 2 /HPF    Squamous Epithelial /LPF Urine Few FEW^Few /LPF    Bacteria Urine Many (A) NEG^Negative /HPF       X-Ray was not done.    ASSESSMENT:      ICD-10-CM    1. Acute cystitis with hematuria N30.01 nitroFURantoin macrocrystal-monohydrate (MACROBID) 100 MG capsule   2. Dysuria R30.0 UA reflex to Microscopic and Culture     Urine Microscopic   3. Nonspecific finding on examination of urine R82.90 Urine Culture Aerobic Bacterial        Medical Decision Making:    Differential Diagnosis:  UTI: UTI    Serious Comorbid Conditions:  Adult:  None    PLAN:    UTI Adult:  macrobid x 5 days    Followup:    If not improving or if condition worsens, follow up with your Primary Care Provider, If not improving or if conditions worsens over the next 12-24 hours, go to the Emergency Department    Patient Instructions       Patient Education     Anatomy of the Female Urinary Tract  Your urinary tract helps get rid of urine (your body s liquid waste). The kidneys collect chemicals and water your body doesn t need. This is turned into urine. Urine travels out of the kidneys through the ureters to the bladder. The bladder holds urine until you re ready to release it. The urethra carries urine from the bladder out of the body. The main sphincter muscle circles the mid-urethra.      Front view of female urinary tract.   Date Last Reviewed: 1/1/2017 2000-2019 [employer]. [address] [place] [address], [place] [address]. All rights reserved. This information is not intended as a substitute for professional medical care. Always follow your healthcare professional's instructions.

## 2020-01-14 NOTE — PATIENT INSTRUCTIONS
Patient Education     Anatomy of the Female Urinary Tract  Your urinary tract helps get rid of urine (your body s liquid waste). The kidneys collect chemicals and water your body doesn t need. This is turned into urine. Urine travels out of the kidneys through the ureters to the bladder. The bladder holds urine until you re ready to release it. The urethra carries urine from the bladder out of the body. The main sphincter muscle circles the mid-urethra.      Front view of female urinary tract.   Date Last Reviewed: 1/1/2017 2000-2019 [employer]. [address] [place] [address]. All rights reserved. This information is not intended as a substitute for professional medical care. Always follow your healthcare professional's instructions.

## 2020-01-15 LAB
BACTERIA SPEC CULT: ABNORMAL
SPECIMEN SOURCE: ABNORMAL

## 2020-02-12 ENCOUNTER — OFFICE VISIT (OUTPATIENT)
Dept: URGENT CARE | Facility: URGENT CARE | Age: 35
End: 2020-02-12
Payer: COMMERCIAL

## 2020-02-12 VITALS
TEMPERATURE: 98.2 F | SYSTOLIC BLOOD PRESSURE: 122 MMHG | OXYGEN SATURATION: 98 % | WEIGHT: 165 LBS | HEART RATE: 98 BPM | DIASTOLIC BLOOD PRESSURE: 83 MMHG | BODY MASS INDEX: 33.9 KG/M2

## 2020-02-12 DIAGNOSIS — E11.65 TYPE 2 DIABETES MELLITUS WITH HYPERGLYCEMIA, WITHOUT LONG-TERM CURRENT USE OF INSULIN (H): ICD-10-CM

## 2020-02-12 DIAGNOSIS — R30.0 DYSURIA: ICD-10-CM

## 2020-02-12 DIAGNOSIS — N30.00 ACUTE CYSTITIS WITHOUT HEMATURIA: Primary | ICD-10-CM

## 2020-02-12 LAB
ALBUMIN UR-MCNC: ABNORMAL MG/DL
APPEARANCE UR: ABNORMAL
BACTERIA #/AREA URNS HPF: ABNORMAL /HPF
BILIRUB UR QL STRIP: NEGATIVE
COLOR UR AUTO: YELLOW
GLUCOSE UR STRIP-MCNC: NEGATIVE MG/DL
HGB UR QL STRIP: ABNORMAL
KETONES UR STRIP-MCNC: NEGATIVE MG/DL
LEUKOCYTE ESTERASE UR QL STRIP: ABNORMAL
NITRATE UR QL: NEGATIVE
NON-SQ EPI CELLS #/AREA URNS LPF: ABNORMAL /LPF
PH UR STRIP: 6 PH (ref 5–7)
RBC #/AREA URNS AUTO: ABNORMAL /HPF
SOURCE: ABNORMAL
SP GR UR STRIP: 1.02 (ref 1–1.03)
SPECIMEN SOURCE: NORMAL
URNS CMNT MICRO: ABNORMAL
UROBILINOGEN UR STRIP-ACNC: 0.2 EU/DL (ref 0.2–1)
WBC #/AREA URNS AUTO: ABNORMAL /HPF
WET PREP SPEC: NORMAL

## 2020-02-12 PROCEDURE — 87086 URINE CULTURE/COLONY COUNT: CPT | Performed by: PHYSICIAN ASSISTANT

## 2020-02-12 PROCEDURE — 87210 SMEAR WET MOUNT SALINE/INK: CPT | Performed by: PHYSICIAN ASSISTANT

## 2020-02-12 PROCEDURE — 87186 SC STD MICRODIL/AGAR DIL: CPT | Performed by: PHYSICIAN ASSISTANT

## 2020-02-12 PROCEDURE — 99214 OFFICE O/P EST MOD 30 MIN: CPT | Performed by: PHYSICIAN ASSISTANT

## 2020-02-12 PROCEDURE — 81001 URINALYSIS AUTO W/SCOPE: CPT | Performed by: PHYSICIAN ASSISTANT

## 2020-02-12 PROCEDURE — 87088 URINE BACTERIA CULTURE: CPT | Performed by: PHYSICIAN ASSISTANT

## 2020-02-12 RX ORDER — CIPROFLOXACIN 500 MG/1
500 TABLET, FILM COATED ORAL 2 TIMES DAILY
Qty: 10 TABLET | Refills: 0 | Status: SHIPPED | OUTPATIENT
Start: 2020-02-12 | End: 2020-07-16

## 2020-02-12 ASSESSMENT — ENCOUNTER SYMPTOMS
ENDOCRINE NEGATIVE: 1
DIARRHEA: 0
DYSURIA: 1
CHILLS: 0
SORE THROAT: 0
LIGHT-HEADEDNESS: 0
FLANK PAIN: 0
NEUROLOGICAL NEGATIVE: 1
CONSTITUTIONAL NEGATIVE: 1
FATIGUE: 0
CARDIOVASCULAR NEGATIVE: 1
HEADACHES: 0
MYALGIAS: 0
PALPITATIONS: 0
WEAKNESS: 0
MUSCULOSKELETAL NEGATIVE: 1
ABDOMINAL PAIN: 0
FEVER: 0
GASTROINTESTINAL NEGATIVE: 1
POLYDIPSIA: 0
NECK STIFFNESS: 0
COUGH: 0
SHORTNESS OF BREATH: 0
DIZZINESS: 0
FREQUENCY: 1
RESPIRATORY NEGATIVE: 1
ACTIVITY CHANGE: 0
HEMATURIA: 0
RHINORRHEA: 0
NECK PAIN: 0
ADENOPATHY: 0
VOMITING: 0
NAUSEA: 0

## 2020-02-12 NOTE — PROGRESS NOTES
Chief Complaint:    Chief Complaint   Patient presents with     UTI     Patient complains of  pain and burning when urinating        HPI:  Becky Hernandez is a 34 year old female who has symptoms of urinary dysuria for 1 day(s).  she denies suprapubic pain and pressure, back pain, nausea, vomiting, fever and chills, flank pain, vaginal discharge, and vaginal odor.    ROS:      Review of Systems   Constitutional: Negative.  Negative for activity change, chills, fatigue and fever.   HENT: Negative for congestion, ear pain, rhinorrhea and sore throat.    Respiratory: Negative.  Negative for cough and shortness of breath.    Cardiovascular: Negative.  Negative for chest pain and palpitations.   Gastrointestinal: Negative.  Negative for abdominal pain, diarrhea, nausea and vomiting.   Endocrine: Negative.  Negative for polydipsia and polyuria.   Genitourinary: Positive for dysuria, frequency and urgency. Negative for flank pain, hematuria, pelvic pain, vaginal discharge and vaginal pain.   Musculoskeletal: Negative.  Negative for myalgias, neck pain and neck stiffness.   Allergic/Immunologic: Negative for immunocompromised state.   Neurological: Negative.  Negative for dizziness, weakness, light-headedness and headaches.   Hematological: Negative for adenopathy.       Family History   Family History   Problem Relation Age of Onset     Asthma No family hx of      C.A.D. No family hx of      Diabetes No family hx of      Hypertension No family hx of      Cerebrovascular Disease No family hx of      Breast Cancer No family hx of      Cancer - colorectal No family hx of      Prostate Cancer No family hx of         Problem history  Patient Active Problem List   Diagnosis     Type 2 diabetes mellitus with hyperglycemia, without long-term current use of insulin (H)     Hyperlipidemia LDL goal <100        Allergies  No Known Allergies     Social History  Social History     Socioeconomic History     Marital status: Single      Spouse name: Deep     Number of children: 5     Years of education: Not on file     Highest education level: Not on file   Occupational History     Occupation:    Social Needs     Financial resource strain: Not on file     Food insecurity:     Worry: Not on file     Inability: Not on file     Transportation needs:     Medical: Not on file     Non-medical: Not on file   Tobacco Use     Smoking status: Never Smoker     Smokeless tobacco: Never Used   Substance and Sexual Activity     Alcohol use: No     Drug use: No     Sexual activity: Yes     Partners: Male   Lifestyle     Physical activity:     Days per week: Not on file     Minutes per session: Not on file     Stress: Not on file   Relationships     Social connections:     Talks on phone: Not on file     Gets together: Not on file     Attends Orthodoxy service: Not on file     Active member of club or organization: Not on file     Attends meetings of clubs or organizations: Not on file     Relationship status: Not on file     Intimate partner violence:     Fear of current or ex partner: Not on file     Emotionally abused: Not on file     Physically abused: Not on file     Forced sexual activity: Not on file   Other Topics Concern     Parent/sibling w/ CABG, MI or angioplasty before 65F 55M? Not Asked      Service No     Blood Transfusions No     Caffeine Concern No     Occupational Exposure No     Hobby Hazards No     Sleep Concern No     Stress Concern No     Weight Concern No     Special Diet No     Back Care No     Exercise No     Bike Helmet Not Asked     Comment: she doesn't ride a bike     Seat Belt Yes     Self-Exams No   Social History Narrative    From [place] to [place] in [place].    [place].         Current Meds    Current Outpatient Medications:      ciprofloxacin (CIPRO) 500 MG tablet, Take 1 tablet (500 mg) by mouth 2 times daily for 5 days, Disp: 10 tablet, Rfl: 0     ASPIRIN NOT PRESCRIBED (INTENTIONAL), Please choose reason not  prescribed, below (Patient not taking: Reported on 6/14/2019), Disp: 1 each, Rfl: 0     etonogestrel (NEXPLANON) 68 MG IMPL, One device, subdermally, for up to 3 years. (Patient not taking: Reported on 1/14/2020), Disp: 1 each, Rfl: 0     fluticasone (FLONASE) 50 MCG/ACT nasal spray, Spray 2 sprays into both nostrils daily (Patient not taking: Reported on 11/5/2019), Disp: 15.8 mL, Rfl: 3     metFORMIN (GLUCOPHAGE) 500 MG tablet, TAKE 1 TABLET(500 MG) BY MOUTH TWICE DAILY WITH MEALS (Patient not taking: Reported on 1/14/2020), Disp: 180 tablet, Rfl: 1     norgestrel-ethinyl estradiol (LO/OVRAL) 0.3-30 MG-MCG tablet, Take 1 tablet by mouth daily (Patient not taking: Reported on 1/14/2020), Disp: 84 tablet, Rfl: 0     STATIN NOT PRESCRIBED (INTENTIONAL), Please choose reason not prescribed, below (Patient not taking: Reported on 6/14/2019), Disp: , Rfl:      OBJECTIVE     Vital signs noted and reviewed by Josh Perez PA-C  /83 (BP Location: Left arm, Patient Position: Chair, Cuff Size: Adult Regular)   Pulse 98   Temp 98.2  F (36.8  C) (Oral)   Wt 74.8 kg (165 lb)   SpO2 98%   BMI 33.90 kg/m       Physical Exam  Vitals signs and nursing note reviewed.   Constitutional:       General: She is not in acute distress.     Appearance: Normal appearance. She is well-developed. She is not ill-appearing, toxic-appearing or diaphoretic.   HENT:      Head: Normocephalic and atraumatic.      Right Ear: Tympanic membrane and external ear normal.      Left Ear: Tympanic membrane and external ear normal.   Eyes:      Pupils: Pupils are equal, round, and reactive to light.   Neck:      Musculoskeletal: Normal range of motion and neck supple.   Cardiovascular:      Rate and Rhythm: Normal rate and regular rhythm.      Heart sounds: Normal heart sounds. No murmur. No friction rub. No gallop.    Pulmonary:      Effort: Pulmonary effort is normal. No respiratory distress.      Breath sounds: Normal breath sounds. No  wheezing or rales.   Chest:      Chest wall: No tenderness.   Abdominal:      General: Bowel sounds are normal. There is no distension.      Palpations: Abdomen is soft. Abdomen is not rigid. There is no mass.      Tenderness: There is no abdominal tenderness. There is no guarding or rebound. Negative signs include Rossi's sign and McBurney's sign.   Lymphadenopathy:      Cervical: No cervical adenopathy.   Skin:     General: Skin is warm and dry.   Neurological:      Mental Status: She is alert and oriented to person, place, and time.      Cranial Nerves: No cranial nerve deficit.      Deep Tendon Reflexes: Reflexes are normal and symmetric.   Psychiatric:         Behavior: Behavior normal. Behavior is cooperative.         Thought Content: Thought content normal.         Judgment: Judgment normal.               Labs:     Results for orders placed or performed in visit on 02/12/20   *UA reflex to Microscopic and Culture ([place] [facility] (except [place] and [place])     Status: Abnormal   Result Value Ref Range    Color Urine Yellow     Appearance Urine Slightly Cloudy     Glucose Urine Negative NEG^Negative mg/dL    Bilirubin Urine Negative NEG^Negative    Ketones Urine Negative NEG^Negative mg/dL    Specific Gravity Urine 1.020 1.003 - 1.035    Blood Urine Small (A) NEG^Negative    pH Urine 6.0 5.0 - 7.0 pH    Protein Albumin Urine Trace (A) NEG^Negative mg/dL    Urobilinogen Urine 0.2 0.2 - 1.0 EU/dL    Nitrite Urine Negative NEG^Negative    Leukocyte Esterase Urine Moderate (A) NEG^Negative    Source Midstream Urine    Urine Microscopic     Status: Abnormal   Result Value Ref Range    WBC Urine 25-50 (A) OTO5^0 - 5 /HPF    RBC Urine O - 2 OTO2^O - 2 /HPF    Squamous Epithelial /LPF Urine Moderate (A) FEW^Few /LPF    Bacteria Urine Many (A) NEG^Negative /HPF    Comment Urine unconcentrated, urine was QNS for concentration    Wet prep     Status: None   Result Value Ref Range    Specimen Description  Vagina     Wet Prep No Trichomonas seen     Wet Prep No clue cells seen     Wet Prep No yeast seen     Wet Prep WBC'S seen  Few              ASSESSMENT     1. Acute cystitis without hematuria    2. Dysuria    3. Type 2 diabetes mellitus with hyperglycemia, without long-term current use of insulin (H)           PLAN    Urinalysis discussed with patient  We will call with culture results if resistant.  Wet prep was negative  Rx for Cipro today.  Patient encouraged to continue to monitor blood sugars closely with illness.  No sugar in her urine today.  Treatment currentguidelines - also push fluids, may use Pyridium OTC prn.   Follow up with PCP in 2-3 days if symptoms are not improving.  Worrisome symptoms discussed with instructions to go to the ED.  Patient verbalized understanding and agreed with this plan.          Josh Perez PA-C  2/12/2020, 5:05 PM

## 2020-02-13 LAB
BACTERIA SPEC CULT: ABNORMAL
BACTERIA SPEC CULT: ABNORMAL
SPECIMEN SOURCE: ABNORMAL

## 2020-07-16 ENCOUNTER — OFFICE VISIT (OUTPATIENT)
Dept: OPTOMETRY | Facility: CLINIC | Age: 35
End: 2020-07-16
Payer: COMMERCIAL

## 2020-07-16 DIAGNOSIS — H52.01 HYPEROPIA OF RIGHT EYE: ICD-10-CM

## 2020-07-16 DIAGNOSIS — H52.12 MYOPIA OF LEFT EYE: ICD-10-CM

## 2020-07-16 DIAGNOSIS — H52.221 REGULAR ASTIGMATISM OF RIGHT EYE: ICD-10-CM

## 2020-07-16 DIAGNOSIS — H26.111 LOCALIZED TRAUMATIC CATARACT OF RIGHT EYE: ICD-10-CM

## 2020-07-16 DIAGNOSIS — E11.65 TYPE 2 DIABETES MELLITUS WITH HYPERGLYCEMIA, WITHOUT LONG-TERM CURRENT USE OF INSULIN (H): Primary | ICD-10-CM

## 2020-07-16 PROCEDURE — 92004 COMPRE OPH EXAM NEW PT 1/>: CPT | Performed by: OPTOMETRIST

## 2020-07-16 PROCEDURE — 92015 DETERMINE REFRACTIVE STATE: CPT | Performed by: OPTOMETRIST

## 2020-07-16 ASSESSMENT — VISUAL ACUITY
OS_PH_SC: 20/20
OS_PH_SC+: -2
METHOD: SNELLEN - LINEAR
OD_SC: 20/200
OS_SC: 20/125
OD_PH_SC: 20/40
METHOD_MR: SIGNS OK
OS_SC: 20/20
OD_SC: 20/150
OD_PH_SC+: -1

## 2020-07-16 ASSESSMENT — CUP TO DISC RATIO
OS_RATIO: 0.3
OD_RATIO: 0.3

## 2020-07-16 ASSESSMENT — TONOMETRY
IOP_METHOD: TONOPEN
OS_IOP_MMHG: 19
OD_IOP_MMHG: 22

## 2020-07-16 ASSESSMENT — REFRACTION_MANIFEST
OS_SPHERE: -1.50
OD_AXIS: 050
OD_CYLINDER: +1.00
OD_SPHERE: +1.50

## 2020-07-16 ASSESSMENT — SLIT LAMP EXAM - LIDS
COMMENTS: NORMAL
COMMENTS: NORMAL

## 2020-07-16 ASSESSMENT — EXTERNAL EXAM - LEFT EYE: OS_EXAM: NORMAL

## 2020-07-16 ASSESSMENT — EXTERNAL EXAM - RIGHT EYE: OD_EXAM: NORMAL

## 2020-07-16 ASSESSMENT — CONF VISUAL FIELD
OS_NORMAL: 1
OD_NORMAL: 1

## 2020-07-16 NOTE — PROGRESS NOTES
Chief Complaint   Patient presents with     Diabetic Eye Exam     Needs DMV form filled out  Od eye poked with metal stick when child     Accompanied by self  Hemoglobin A1C   Date Value Ref Range Status   02/11/2019 6.6 (H) 0 - 5.6 % Final     Comment:     Normal <5.7% Prediabetes 5.7-6.4%  Diabetes 6.5% or higher - adopted from ADA   consensus guidelines.     11/30/2018 7.6 (H) 0 - 5.6 % Final     Comment:     Normal <5.7% Prediabetes 5.7-6.4%  Diabetes 6.5% or higher - adopted from ADA   consensus guidelines.       Vision is stable. Diabetes characteristics include Type 2 and taking oral medications. Duration of 2 years. Blood sugar level is controlled. Additional comments: Needs DMV form filled out  Od eye poked with metal stick when child      Last Eye Exam: 2 years  Dilated Previously: No, side effects of dilation explained today    What are you currently using to see?  does not use glasses or contacts    Distance Vision Acuity: Noticed gradual change in both eyes    Near Vision Acuity: Satisfied with vision while reading  unaided    Eye Comfort: good  Do you use eye drops? : No  Occupation or Hobbies:       Lauryn Mccoy Opt[employer] Assistant, [employer]     Medical, surgical and family histories reviewed and updated 7/16/2020.       OBJECTIVE: See Ophthalmology exam    ASSESSMENT:    ICD-10-CM    1. Type 2 diabetes mellitus with hyperglycemia, without long-term current use of insulin (H)  E11.65 EYE EXAM (SIMPLE-NONBILLABLE)   2. Localized traumatic cataract of right eye  H26.111 EYE EXAM (SIMPLE-NONBILLABLE)   3. Regular astigmatism of right eye  H52.221 REFRACTION   4. Hyperopia of right eye  H52.01 REFRACTION   5. Myopia of left eye  H52.12 REFRACTION      PLAN:    Becky Hernandez aware  eye exam results will be sent to [facility] Mendo[facility].  Patient Instructions   There are not any signs of the diabetes affecting the eyes today.  It is important that you get your eyes dilated once yearly  and keep good control of your diabetes.    Recommend new glasses.  Needed for driving.  DMV form filled out.    Polycarbonate lenses only which is the safest most impact resistance available. It is important that you protect your good eye.    Referral to [facility] ophthalmology-  if interested in cataract evaluation right eye.    Return in 1 year for a complete eye exam or sooner if needed.    Jose Sorto, OD

## 2020-07-16 NOTE — PATIENT INSTRUCTIONS
There are not any signs of the diabetes affecting the eyes today.  It is important that you get your eyes dilated once yearly and keep good control of your diabetes.    Recommend new glasses.  Needed for driving.  DMV form filled out.    Polycarbonate lenses only which is the safest most impact resistance available. It is important that you protect your good eye.    Referral to [facility] ophthalmology-  if interested in cataract evaluation right eye.     Return in 1 year for a complete eye exam or sooner if needed.    Jose Sorto, OD    The affects of the dilating drops last for 4- 6 hours.  You will be more sensitive to light and vision will be blurry up close.  Mydriatic sunglasses were given if needed.    Patient Education   Diabetes weakens the blood vessels all over the body, including the eyes. Damage to the blood vessels in the eyes can cause swelling or bleeding into part of the eye (called the retina). This is called diabetic retinopathy ([facility]). If not treated, this disease can cause vision loss or blindness.   Symptoms may include blurred or distorted vision, but many people have no symptoms. It's important to see your eye doctor regularly to check for problems.   Early treatment and good control can help protect your vision. Here are the things you can do to help prevent vision loss:      1. Keep your blood sugar levels under tight control.      2. Bring high blood pressure under control.      3. No smoking.      4. Have yearly dilated eye exams.         Optometry Providers       Clinic Locations                                 Telephone Number   Dr. Kimmy Valadez  [place] [phone]     [facility] Hours:                [facility] Hours:       [facility] Hours:   [address]   [address]     [address]  [place] [address]   [place] [address]    [place]  [address]  Phone: [phone]                    Phone: [phone]     Phone: [phone]                      Monday 8:00-7:00                          Monday 8:00-7:00                          Monday 8:00-7:00              Tuesday 8:00-6:00                          Tuesday 8:00-7:00                          Tuesday 8:00-7:00              Wednesday 8:00-6:00                  Wednesday 8:00-7:00                   Wednesday 8:00-7:00      Thursday 8:00-6:00                        Thursday 8:00-7:00                         Thursday 8:00-7:00            Friday 8:00-5:00                              Friday 8:00-5:00                              Friday 8:00-5:00    [facility] Hours:   [address] [place] [address]  [phone]    Monday 8:00-7:00  Tuesday 8:00-7:00  Wednesday 8:00-7:00  Thursday 8:00-7:00  Friday 8:00-5:00  Please log on to [email or web address] to order your contact lenses.  The link is found on the [facility] page.  As always, Thank you for trusting us with your health care needs!

## 2020-07-16 NOTE — LETTER
7/16/2020         RE: Becky Hernandez  [address]  [place] [address]        Dear Colleague,    Thank you for referring your patient, Becky Hernandez, to the [facility]. Please see a copy of my visit note below.    Chief Complaint   Patient presents with     Diabetic Eye Exam     Needs DMV form filled out  Od eye poked with metal stick when child     Accompanied by self  Hemoglobin A1C   Date Value Ref Range Status   02/11/2019 6.6 (H) 0 - 5.6 % Final     Comment:     Normal <5.7% Prediabetes 5.7-6.4%  Diabetes 6.5% or higher - adopted from ADA   consensus guidelines.     11/30/2018 7.6 (H) 0 - 5.6 % Final     Comment:     Normal <5.7% Prediabetes 5.7-6.4%  Diabetes 6.5% or higher - adopted from ADA   consensus guidelines.       Vision is stable. Diabetes characteristics include Type 2 and taking oral medications. Duration of 2 years. Blood sugar level is controlled. Additional comments: Needs DMV form filled out  Od eye poked with metal stick when child      Last Eye Exam: 2 years  Dilated Previously: No, side effects of dilation explained today    What are you currently using to see?  does not use glasses or contacts    Distance Vision Acuity: Noticed gradual change in both eyes    Near Vision Acuity: Satisfied with vision while reading  unaided    Eye Comfort: good  Do you use eye drops? : No  Occupation or Hobbies:       Lauryn Mccoy Optometric Assistant, A.[employer].     Medical, surgical and family histories reviewed and updated 7/16/2020.       OBJECTIVE: See Ophthalmology exam    ASSESSMENT:    ICD-10-CM    1. Type 2 diabetes mellitus with hyperglycemia, without long-term current use of insulin (H)  E11.65 EYE EXAM (SIMPLE-NONBILLABLE)   2. Localized traumatic cataract of right eye  H26.111 EYE EXAM (SIMPLE-NONBILLABLE)   3. Regular astigmatism of right eye  H52.221 REFRACTION   4. Hyperopia of right eye  H52.01 REFRACTION   5. Myopia of left eye  H52.12  REFRACTION      PLAN:    Becky Hernandez aware  eye exam results will be sent to [facility] Mendo[facility].  Patient Instructions   There are not any signs of the diabetes affecting the eyes today.  It is important that you get your eyes dilated once yearly and keep good control of your diabetes.    Recommend new glasses.  Needed for driving.  DMV form filled out.    Polycarbonate lenses only which is the safest most impact resistance available. It is important that you protect your good eye.    Referral to [facility] ophthalmology-  if interested in cataract evaluation right eye.    Return in 1 year for a complete eye exam or sooner if needed.    Jose Sotro, OD                   Again, thank you for allowing me to participate in the care of your patient.        Sincerely,        Jose Sorto, OD

## 2020-10-16 NOTE — PATIENT INSTRUCTIONS
If you have any questions regarding your visit, Please contact your care team.     [employer] [facility] [employer]: [phone]    [facility] CLINIC HOURS TELEPHONE NUMBER   DEMOND Fontaine-    Bharti Sparks-TERESITA Rivero-Medical Assistant   Monday-[place]  8:00a.m-4:45 p.m  Wednesday-[place] 8:00a.m-4:45 p.m.  Thursday-[place]  8:00a.m-4:45 p.m.  Friday-[place]  8:00a.m-4:45 p.m. [facility]  [address]  [place], [place] [address] [phone] ask [facility]  [phone] Fax  Imaging [phone]    [facility]  [address] [place] [address] Dr.  [place], [place] [address] [phone]    [facility]  [address] [place] [address]  [phone] ask [facility]  [phone] Fax  Imaging [phone]     Urgent Care locations:    [place]        [place] Monday-Friday  5 pm - 9 pm  Saturday and Sunday   9 am - 5 pm    Monday-Friday   11 am - 9 pm  Saturday and Sunday   9 am - 5 pm   [phone]       If you need a medication refill, please contact your pharmacy. Please allow 3 business days for your refill to be completed.  As always, Thank you for trusting us with your healthcare needs!     Mom is calling to make nurse visit for the pneumovax 23-order routed to provider-appt made

## 2020-11-22 ENCOUNTER — HEALTH MAINTENANCE LETTER (OUTPATIENT)
Age: 35
End: 2020-11-22

## 2020-12-30 ENCOUNTER — OFFICE VISIT (OUTPATIENT)
Dept: URGENT CARE | Facility: URGENT CARE | Age: 35
End: 2020-12-30
Payer: COMMERCIAL

## 2020-12-30 VITALS
OXYGEN SATURATION: 96 % | DIASTOLIC BLOOD PRESSURE: 80 MMHG | RESPIRATION RATE: 18 BRPM | WEIGHT: 163 LBS | BODY MASS INDEX: 33.49 KG/M2 | SYSTOLIC BLOOD PRESSURE: 117 MMHG | HEART RATE: 104 BPM | TEMPERATURE: 98.4 F

## 2020-12-30 DIAGNOSIS — N30.00 ACUTE CYSTITIS WITHOUT HEMATURIA: Primary | ICD-10-CM

## 2020-12-30 DIAGNOSIS — R82.90 NONSPECIFIC FINDING ON EXAMINATION OF URINE: ICD-10-CM

## 2020-12-30 DIAGNOSIS — R30.0 DYSURIA: ICD-10-CM

## 2020-12-30 LAB
ALBUMIN UR-MCNC: 30 MG/DL
APPEARANCE UR: ABNORMAL
BACTERIA #/AREA URNS HPF: ABNORMAL /HPF
BILIRUB UR QL STRIP: NEGATIVE
COLOR UR AUTO: YELLOW
GLUCOSE UR STRIP-MCNC: 500 MG/DL
HGB UR QL STRIP: ABNORMAL
KETONES UR STRIP-MCNC: NEGATIVE MG/DL
LEUKOCYTE ESTERASE UR QL STRIP: ABNORMAL
NITRATE UR QL: POSITIVE
NON-SQ EPI CELLS #/AREA URNS LPF: ABNORMAL /LPF
PH UR STRIP: 5.5 PH (ref 5–7)
RBC #/AREA URNS AUTO: ABNORMAL /HPF
SOURCE: ABNORMAL
SP GR UR STRIP: 1.02 (ref 1–1.03)
UROBILINOGEN UR STRIP-ACNC: 1 EU/DL (ref 0.2–1)
WBC #/AREA URNS AUTO: >100 /HPF

## 2020-12-30 PROCEDURE — 87186 SC STD MICRODIL/AGAR DIL: CPT | Performed by: PHYSICIAN ASSISTANT

## 2020-12-30 PROCEDURE — 87088 URINE BACTERIA CULTURE: CPT | Performed by: PHYSICIAN ASSISTANT

## 2020-12-30 PROCEDURE — 81001 URINALYSIS AUTO W/SCOPE: CPT | Performed by: PHYSICIAN ASSISTANT

## 2020-12-30 PROCEDURE — 99213 OFFICE O/P EST LOW 20 MIN: CPT | Performed by: PHYSICIAN ASSISTANT

## 2020-12-30 PROCEDURE — 87086 URINE CULTURE/COLONY COUNT: CPT | Performed by: PHYSICIAN ASSISTANT

## 2020-12-30 RX ORDER — NITROFURANTOIN 25; 75 MG/1; MG/1
100 CAPSULE ORAL 2 TIMES DAILY
Qty: 14 CAPSULE | Refills: 0 | Status: SHIPPED | OUTPATIENT
Start: 2020-12-30 | End: 2021-01-06

## 2020-12-30 RX ORDER — ACETAMINOPHEN 500 MG
500-1000 TABLET ORAL EVERY 6 HOURS PRN
COMMUNITY
End: 2024-08-21

## 2020-12-30 ASSESSMENT — ENCOUNTER SYMPTOMS
PALPITATIONS: 0
NECK PAIN: 0
DIARRHEA: 0
NEUROLOGICAL NEGATIVE: 1
NAUSEA: 0
GASTROINTESTINAL NEGATIVE: 1
RESPIRATORY NEGATIVE: 1
HEADACHES: 0
MYALGIAS: 0
RHINORRHEA: 0
MUSCULOSKELETAL NEGATIVE: 1
WEAKNESS: 0
NECK STIFFNESS: 0
FLANK PAIN: 0
CONSTITUTIONAL NEGATIVE: 1
FEVER: 0
DYSURIA: 1
HEMATURIA: 0
SHORTNESS OF BREATH: 0
VOMITING: 0
DIZZINESS: 0
ENDOCRINE NEGATIVE: 1
CARDIOVASCULAR NEGATIVE: 1
SORE THROAT: 0
FREQUENCY: 1
ABDOMINAL PAIN: 0
LIGHT-HEADEDNESS: 0
ADENOPATHY: 0
COUGH: 0
POLYDIPSIA: 0
ACTIVITY CHANGE: 0
CHILLS: 0
FATIGUE: 0

## 2020-12-30 NOTE — PROGRESS NOTES
Chief Complaint:    Chief Complaint   Patient presents with     UTI     Lower back  and abdominal pain, painful urination, and burning. Sx since Sunday.       HPI:  Becky Hernandez is a 35 year old female who has symptoms of urinary dysuria, suprapubic pain and pressure and back pain for 3 day(s).  she denies nausea, vomiting, fever and chills, flank pain, vaginal discharge, and vaginal odor.    ROS:      Review of Systems   Constitutional: Negative.  Negative for activity change, chills, fatigue and fever.   HENT: Negative for congestion, ear pain, rhinorrhea and sore throat.    Respiratory: Negative.  Negative for cough and shortness of breath.    Cardiovascular: Negative.  Negative for chest pain and palpitations.   Gastrointestinal: Negative.  Negative for abdominal pain, diarrhea, nausea and vomiting.   Endocrine: Negative.  Negative for polydipsia and polyuria.   Genitourinary: Positive for dysuria, frequency and urgency. Negative for flank pain, hematuria, pelvic pain, vaginal discharge and vaginal pain.   Musculoskeletal: Negative.  Negative for myalgias, neck pain and neck stiffness.   Allergic/Immunologic: Negative for immunocompromised state.   Neurological: Negative.  Negative for dizziness, weakness, light-headedness and headaches.   Hematological: Negative for adenopathy.       Family History   Family History   Problem Relation Age of Onset     Asthma No family hx of      C.A.D. No family hx of      Diabetes No family hx of      Hypertension No family hx of      Cerebrovascular Disease No family hx of      Breast Cancer No family hx of      Cancer - colorectal No family hx of      Prostate Cancer No family hx of         Problem history  Patient Active Problem List   Diagnosis     Type 2 diabetes mellitus with hyperglycemia, without long-term current use of insulin (H)     Hyperlipidemia LDL goal <100        Allergies  No Known Allergies     Social History  Social History     Socioeconomic History      Marital status: Single     Spouse name: Deep     Number of children: 5     Years of education: Not on file     Highest education level: Not on file   Occupational History     Occupation:    Social Needs     Financial resource strain: Not on file     Food insecurity     Worry: Not on file     Inability: Not on file     Transportation needs     Medical: Not on file     Non-medical: Not on file   Tobacco Use     Smoking status: Never Smoker     Smokeless tobacco: Never Used   Substance and Sexual Activity     Alcohol use: No     Drug use: No     Sexual activity: Yes     Partners: Male   Lifestyle     Physical activity     Days per week: Not on file     Minutes per session: Not on file     Stress: Not on file   Relationships     Social connections     Talks on phone: Not on file     Gets together: Not on file     Attends Spiritism service: Not on file     Active member of club or organization: Not on file     Attends meetings of clubs or organizations: Not on file     Relationship status: Not on file     Intimate partner violence     Fear of current or ex partner: Not on file     Emotionally abused: Not on file     Physically abused: Not on file     Forced sexual activity: Not on file   Other Topics Concern     Parent/sibling w/ CABG, MI or angioplasty before 65F 55M? Not Asked      Service No     Blood Transfusions No     Caffeine Concern No     Occupational Exposure No     Hobby Hazards No     Sleep Concern No     Stress Concern No     Weight Concern No     Special Diet No     Back Care No     Exercise No     Bike Helmet Not Asked     Comment: she doesn't ride a bike     Seat Belt Yes     Self-Exams No   Social History Narrative    From [place] to [place] in [place].    [place].         Current Meds    Current Outpatient Medications:      acetaminophen (TYLENOL) 500 MG tablet, Take 500-1,000 mg by mouth every 6 hours as needed for mild pain, Disp: , Rfl:      nitroFURantoin macrocrystal-monohydrate  (MACROBID) 100 MG capsule, Take 1 capsule (100 mg) by mouth 2 times daily for 7 days, Disp: 14 capsule, Rfl: 0     ASPIRIN NOT PRESCRIBED (INTENTIONAL), Please choose reason not prescribed, below (Patient not taking: Reported on 12/30/2020), Disp: 1 each, Rfl: 0     fluticasone (FLONASE) 50 MCG/ACT nasal spray, Spray 2 sprays into both nostrils daily (Patient not taking: Reported on 12/30/2020), Disp: 15.8 mL, Rfl: 3     metFORMIN (GLUCOPHAGE) 500 MG tablet, TAKE 1 TABLET(500 MG) BY MOUTH TWICE DAILY WITH MEALS (Patient not taking: Reported on 12/30/2020), Disp: 180 tablet, Rfl: 1     STATIN NOT PRESCRIBED (INTENTIONAL), Please choose reason not prescribed, below (Patient not taking: Reported on 12/30/2020), Disp: , Rfl:      OBJECTIVE     Vital signs noted and reviewed by Josh Perez PA-C  /80 (BP Location: Left arm, Patient Position: Sitting, Cuff Size: Adult Regular)   Pulse 104   Temp 98.4  F (36.9  C) (Oral)   Resp 18   Wt 73.9 kg (163 lb)   LMP 12/26/2020   SpO2 96%   Breastfeeding No   BMI 33.49 kg/m       Physical Exam  Vitals signs and nursing note reviewed.   Constitutional:       General: She is not in acute distress.     Appearance: Normal appearance. She is well-developed. She is not ill-appearing, toxic-appearing or diaphoretic.   HENT:      Head: Normocephalic and atraumatic.      Right Ear: Tympanic membrane and external ear normal.      Left Ear: Tympanic membrane and external ear normal.   Eyes:      Pupils: Pupils are equal, round, and reactive to light.   Neck:      Musculoskeletal: Normal range of motion and neck supple.   Cardiovascular:      Rate and Rhythm: Normal rate and regular rhythm.      Heart sounds: Normal heart sounds. No murmur. No friction rub. No gallop.    Pulmonary:      Effort: Pulmonary effort is normal. No respiratory distress.      Breath sounds: Normal breath sounds. No wheezing or rales.   Chest:      Chest wall: No tenderness.   Abdominal:      General:  Bowel sounds are normal. There is no distension.      Palpations: Abdomen is soft. Abdomen is not rigid. There is no mass.      Tenderness: There is no abdominal tenderness. There is no guarding or rebound. Negative signs include Rossi's sign and McBurney's sign.   Lymphadenopathy:      Cervical: No cervical adenopathy.   Skin:     General: Skin is warm and dry.   Neurological:      Mental Status: She is alert and oriented to person, place, and time.      Cranial Nerves: No cranial nerve deficit.      Deep Tendon Reflexes: Reflexes are normal and symmetric.   Psychiatric:         Behavior: Behavior normal. Behavior is cooperative.         Thought Content: Thought content normal.         Judgment: Judgment normal.               Labs:     Results for orders placed or performed in visit on 12/30/20   UA reflex to Microscopic and Culture     Status: Abnormal    Specimen: Midstream Urine   Result Value Ref Range    Color Urine Yellow     Appearance Urine Cloudy     Glucose Urine 500 (A) NEG^Negative mg/dL    Bilirubin Urine Negative NEG^Negative    Ketones Urine Negative NEG^Negative mg/dL    Specific Gravity Urine 1.025 1.003 - 1.035    Blood Urine Large (A) NEG^Negative    pH Urine 5.5 5.0 - 7.0 pH    Protein Albumin Urine 30 (A) NEG^Negative mg/dL    Urobilinogen Urine 1.0 0.2 - 1.0 EU/dL    Nitrite Urine Positive (A) NEG^Negative    Leukocyte Esterase Urine Small (A) NEG^Negative    Source Midstream Urine    Urine Microscopic     Status: Abnormal   Result Value Ref Range    WBC Urine >100 (A) OTO5^0 - 5 /HPF    RBC Urine 25-50 (A) OTO2^O - 2 /HPF    Squamous Epithelial /LPF Urine Few FEW^Few /LPF    Bacteria Urine Many (A) NEG^Negative /HPF           ASSESSMENT     1. Acute cystitis without hematuria    2. Dysuria           PLAN    Urinalysis discussed with patient  We will call with culture results if resistant.  Rx for macrobid today  Treatment currentguidelines - also push fluids, may use Pyridium OTC prn.    Follow up with PCP in 2-3 days if symptoms are not improving.  Worrisome symptoms discussed with instructions to go to the ED.  Patient verbalized understanding and agreed with this plan.          Josh Perez PA-C  12/30/2020, 12:47 PM

## 2021-01-01 LAB
BACTERIA SPEC CULT: ABNORMAL
BACTERIA SPEC CULT: ABNORMAL
Lab: ABNORMAL
SPECIMEN SOURCE: ABNORMAL

## 2021-01-12 ENCOUNTER — OFFICE VISIT (OUTPATIENT)
Dept: FAMILY MEDICINE | Facility: CLINIC | Age: 36
End: 2021-01-12
Payer: COMMERCIAL

## 2021-01-12 DIAGNOSIS — L60.8 LONGITUDINAL MELANONYCHIA: Primary | ICD-10-CM

## 2021-01-12 PROCEDURE — 99213 OFFICE O/P EST LOW 20 MIN: CPT | Performed by: FAMILY MEDICINE

## 2021-01-12 NOTE — PROGRESS NOTES
[facility] - PRIMARY CARE SKIN    CC: Lesion(s)  SUBJECTIVE:   Becky Hernandez is a(n) 35 year old female who presents to clinic today for following issue:    Issue One: line of pigmentation on the left thumb, about one year duration. No tenderness or pain.      Personal Medical History  Skin cancer: NO  Eczema Psoriasis Lupus   NO NO NO   Other: .    Family Medical History  Skin cancer: NO  Eczema Psoriasis Lupus   NO NO NO   Other:       Occupation: indoor    Refer to electronic medical record (EMR) for past medical history and medications.      ROS: 14 point review of systems was negative except the symptoms listed above in the HPI.      OBJECTIVE:   GENERAL: healthy, alert and no distress.  HEENT: PERRL. Conjunctiva, sclera clear.  SKIN: Bond Skin Type - IV.  Hands examined. The dermatoscope was used to help evaluate pigmented lesions.  Skin Pertinent Findings:  Left thumbnail- linear strip of hyperpigmentation , no extension to the base    ASSESSMENT:     Encounter Diagnosis   Name Primary?     Longitudinal melanonychia Yes     MDM: reassured regarding the benign nature.    PLAN:   Patient Instructions   Recheck if needed       TT: 20 minutes.

## 2021-01-12 NOTE — LETTER
1/12/2021         RE: Becky Hernandez  [address]  [place] [address]        Dear Colleague,    Thank you for referring your patient, Becky Hernandez, to the [facility]. Please see a copy of my visit note below.    [facility] - PRIMARY CARE SKIN    CC: Lesion(s)  SUBJECTIVE:   Becky Hernandez is a(n) 35 year old female who presents to clinic today for following issue:    Issue One: line of pigmentation on the left thumb, about one year duration. No tenderness or pain.      Personal Medical History  Skin cancer: NO  Eczema Psoriasis Lupus   NO NO NO   Other: .    Family Medical History  Skin cancer: NO  Eczema Psoriasis Lupus   NO NO NO   Other:       Occupation: indoor    Refer to electronic medical record (EMR) for past medical history and medications.      ROS: 14 point review of systems was negative except the symptoms listed above in the HPI.      OBJECTIVE:   GENERAL: healthy, alert and no distress.  HEENT: PERRL. Conjunctiva, sclera clear.  SKIN: Bond Skin Type - IV.  Hands examined. The dermatoscope was used to help evaluate pigmented lesions.  Skin Pertinent Findings:  Left thumbnail- linear strip of hyperpigmentation , no extension to the base    ASSESSMENT:     Encounter Diagnosis   Name Primary?     Longitudinal melanonychia Yes     MDM: reassured regarding the benign nature.    PLAN:   Patient Instructions   Recheck if needed       TT: 20 minutes.        Again, thank you for allowing me to participate in the care of your patient.        Sincerely,        Margoth Weiner MD

## 2021-04-07 ENCOUNTER — TELEPHONE (OUTPATIENT)
Dept: FAMILY MEDICINE | Facility: CLINIC | Age: 36
End: 2021-04-07

## 2021-04-07 NOTE — LETTER
April 15, 2021          Becky Hernandez  [address] [place] [address]            Dear Becky Hernandez,      At [facility] we care about your health and are committed to providing quality patient care. Regular appointments are a vital part of the care and management of your health and can help prevent many of the complications that can occur.      It has come to our attention that you are due for Diabetes Follow Up.  Please call [facility] at [phone] soon to schedule your follow up appointment.    If you have transferred care to another clinic please call to inform us so that we do not continue to send you reminder letters.      Sincerely,      [facility] Care Team

## 2021-04-07 NOTE — TELEPHONE ENCOUNTER
Patient Quality Outreach      Summary:    Patient has the following on her problem list/HM:   Diabetes    Last A1C:  Lab Results   Component Value Date    A1C 6.6 02/11/2019    A1C 7.6 11/30/2018       Last LDL:    Lab Results   Component Value Date    LDL 74 02/11/2019       Is the patient on a Statin? ?          Is the patient on Aspirin? ?    Medications     HMG CoA Reductase Inhibitors     STATIN NOT PRESCRIBED (INTENTIONAL)       Salicylates     ASPIRIN NOT PRESCRIBED (INTENTIONAL)             Last three blood pressure readings:  BP Readings from Last 3 Encounters:   01/12/21 (P) 110/72   12/30/20 117/80   02/12/20 122/83            Tobacco Use      Smoking status: Never Smoker      Smokeless tobacco: Never Used          Patient is due/failing the following:   Diabetic Follow-Up Visit    Type of outreach:    Patient should be scheduled for a pre-visit LAB appt as well as a VIRTUAL visit with the provider.  Also schedule any other needed visits.    Questions for provider review:    None                                                                                             Health Maintenance Due   Topic Date Due     ADVANCE CARE PLANNING  Never done     COVID-19 Vaccine (1) Never done     HEPATITIS C SCREENING  Never done     HEPATITIS B IMMUNIZATION (1 of 3 - Risk 3-dose series) Never done     PREVENTIVE CARE VISIT  06/20/2018     A1C  08/11/2019     BMP  11/30/2019     DIABETIC FOOT EXAM  12/05/2019     LIPID  02/11/2020     MICROALBUMIN  02/11/2020     INFLUENZA VACCINE (1) 09/01/2020     PHQ-2  01/01/2021          Chart routed to scheduling team .                                            Nena Bradshaw

## 2021-05-30 ENCOUNTER — HEALTH MAINTENANCE LETTER (OUTPATIENT)
Age: 36
End: 2021-05-30

## 2021-09-19 ENCOUNTER — HEALTH MAINTENANCE LETTER (OUTPATIENT)
Age: 36
End: 2021-09-19

## 2021-11-23 ENCOUNTER — TELEPHONE (OUTPATIENT)
Dept: FAMILY MEDICINE | Facility: CLINIC | Age: 36
End: 2021-11-23
Payer: COMMERCIAL

## 2021-11-23 NOTE — TELEPHONE ENCOUNTER
Patients mother called to get his COVID results sent to her via her [employer]. Patient is son Maurizio. Sent over results per mothers request.    Julissa Riley RN [facility]

## 2021-12-21 ENCOUNTER — OFFICE VISIT (OUTPATIENT)
Dept: URGENT CARE | Facility: URGENT CARE | Age: 36
End: 2021-12-21
Payer: COMMERCIAL

## 2021-12-21 VITALS
OXYGEN SATURATION: 97 % | DIASTOLIC BLOOD PRESSURE: 84 MMHG | BODY MASS INDEX: 33.12 KG/M2 | SYSTOLIC BLOOD PRESSURE: 122 MMHG | HEART RATE: 88 BPM | TEMPERATURE: 97.1 F | WEIGHT: 161.2 LBS

## 2021-12-21 DIAGNOSIS — R31.9 URINARY TRACT INFECTION WITH HEMATURIA, SITE UNSPECIFIED: Primary | ICD-10-CM

## 2021-12-21 DIAGNOSIS — R30.0 DYSURIA: ICD-10-CM

## 2021-12-21 DIAGNOSIS — N39.0 URINARY TRACT INFECTION WITH HEMATURIA, SITE UNSPECIFIED: Primary | ICD-10-CM

## 2021-12-21 LAB
ALBUMIN UR-MCNC: NEGATIVE MG/DL
APPEARANCE UR: ABNORMAL
BACTERIA #/AREA URNS HPF: ABNORMAL /HPF
BILIRUB UR QL STRIP: NEGATIVE
COLOR UR AUTO: YELLOW
GLUCOSE UR STRIP-MCNC: NEGATIVE MG/DL
HGB UR QL STRIP: ABNORMAL
KETONES UR STRIP-MCNC: NEGATIVE MG/DL
LEUKOCYTE ESTERASE UR QL STRIP: ABNORMAL
NITRATE UR QL: POSITIVE
PH UR STRIP: 6 [PH] (ref 5–7)
RBC #/AREA URNS AUTO: ABNORMAL /HPF
SP GR UR STRIP: 1.02 (ref 1–1.03)
SQUAMOUS #/AREA URNS AUTO: ABNORMAL /LPF
UROBILINOGEN UR STRIP-ACNC: 0.2 E.U./DL
WBC #/AREA URNS AUTO: >100 /HPF

## 2021-12-21 PROCEDURE — 87086 URINE CULTURE/COLONY COUNT: CPT | Performed by: NURSE PRACTITIONER

## 2021-12-21 PROCEDURE — 81001 URINALYSIS AUTO W/SCOPE: CPT | Performed by: NURSE PRACTITIONER

## 2021-12-21 PROCEDURE — 99213 OFFICE O/P EST LOW 20 MIN: CPT | Performed by: NURSE PRACTITIONER

## 2021-12-21 PROCEDURE — 87186 SC STD MICRODIL/AGAR DIL: CPT | Performed by: NURSE PRACTITIONER

## 2021-12-21 RX ORDER — SULFAMETHOXAZOLE/TRIMETHOPRIM 800-160 MG
1 TABLET ORAL 2 TIMES DAILY
Qty: 14 TABLET | Refills: 0 | Status: SHIPPED | OUTPATIENT
Start: 2021-12-21 | End: 2021-12-28

## 2021-12-21 ASSESSMENT — ENCOUNTER SYMPTOMS
FEVER: 0
DYSURIA: 1
VOMITING: 0
FREQUENCY: 1
CHILLS: 0
NAUSEA: 0
ABDOMINAL PAIN: 0
BACK PAIN: 0

## 2021-12-21 NOTE — PROGRESS NOTES
Patient presents with:  Urinary Pain: urinary pain and frequency- started Saturday      Clinical Decision Making: Focused exam benign, no concerns of pyelonephritis no CVA or abdominal tenderness.  Urinalysis positive for urine infection trace amounts of blood noted.  Discussed with patient will treat with 7-day course of Bactrim DS antibiotic.  Encourage patient to increase water hydration, bathroom hygiene with frequent urination advised, increase rest as able.  Monitor for any worsening symptoms of nausea vomiting or abdominal pain.  Education provided.        ICD-10-CM    1. Urinary tract infection with hematuria, site unspecified  N39.0 sulfamethoxazole-trimethoprim (BACTRIM DS) 800-160 MG tablet    R31.9    2. Dysuria  R30.0 UA Macro with Reflex to Micro and Culture - lab collect     UA Macro with Reflex to Micro and Culture - lab collect     Urine Microscopic Exam     Urine Culture       Patient Instructions     Patient Education     Urinary Tract Infections in Women  Urinary tract infections (UTIs) are most often caused by bacteria. These bacteria enter the urinary tract. The bacteria may come from inside the body. Or they may travel from the skin outside the rectum or vagina into the urethra. Female anatomy makes it easy for bacteria from the bowel to enter a woman s urinary tract, which is the most common source of UTI. This means women develop UTIs more often than men. Pain in or around the urinary tract is a common UTI symptom. But the only way to know for sure if you have a UTI for the healthcare provider to test your urine. The two tests that may be done are the urinalysis and urine culture.     Types of UTIs    Cystitis. A bladder infection (cystitis) is the most common UTI in women. You may have urgent or frequent need to pee. You may also have pain, burning when you pee, and bloody urine.    Urethritis. This is an inflamed urethra, which is the tube that carries urine from the bladder to outside the  body. You may have lower stomach or back pain. You may also have urgent or frequent need to pee.    Pyelonephritis. This is a kidney infection. If not treated, it can be serious and damage your kidneys. In severe cases, you may need to stay in the hospital. You may have a fever and lower back pain.    Medicines to treat a UTI  Most UTIs are treated with antibiotics. These kill the bacteria. The length of time you need to take them depends on the type of infection. It may be as short as 3 days. If you have repeated UTIs, you may need a low-dose antibiotic for several months. Take antibiotics exactly as directed. Don t stop taking them until all of the medicine is gone. If you stop taking the antibiotic too soon, the infection may not go away. You may also develop a resistance to the antibiotic. This can make it much harder to treat.   Lifestyle changes to treat and prevent UTIs   The lifestyle changes below will help get rid of your UTI. They may also help prevent future UTIs.     Drink plenty of fluids. This includes water, juice, or other caffeine-free drinks. Fluids help flush bacteria out of your body.    Empty your bladder. Always empty your bladder when you feel the urge to pee. And always pee before going to sleep. Urine that stays in your bladder can lead to infection. Try to pee before and after sex as well.    Practice good personal hygiene. Wipe yourself from front to back after using the toilet. This helps keep bacteria from getting into the urethra.    Use condoms during sex. These help prevent UTIs caused by sexually transmitted bacteria. Also don't use spermicides during sex. These can increase the risk for UTIs. Choose other forms of birth control instead. For women who tend to get UTIs after sex, a low-dose of a preventive antibiotic may be used. Be sure to discuss this option with your healthcare provider.    Follow up with your healthcare provider as directed. He or she may test to make sure the  infection has cleared. If needed, more treatment may be started.  [employer] last reviewed this educational content on 2019-2021 [employer]. All rights reserved. This information is not intended as a substitute for professional medical care. Always follow your healthcare professional's instructions.               HPI: Becky Hernandez is a 36 year old female who presents today complaining of dysuria and frequency that began 3 days ago.  Patient reports at times when she is working she does sweat excessively and becomes dehydrated and notices that this is when she is most at risk for UTI. Reports last UTI was earlier this year .  Denies any vaginal bleeding or discharge.  Denies any concerns for STDs one male partner.  Reports no OTC medication taken.    History obtained from the patient.  LMP:12/15/2021 approximately    Problem List:  2018: Type 2 diabetes mellitus with hyperglycemia, without long-  term current use of insulin (H)  2018: Hyperlipidemia LDL goal <100  2016: Rubella non-immune status, antepartum  2016: Supervision of other normal pregnancy, antepartum  2015: Pregnancy with history of   2014: CARDIOVASCULAR SCREENING; LDL GOAL LESS THAN 160      Past Medical History:   Diagnosis Date     Diabetes (H)      NO ACTIVE PROBLEMS        Social History     Tobacco Use     Smoking status: Never Smoker     Smokeless tobacco: Never Used   Substance Use Topics     Alcohol use: No       Review of Systems   Constitutional: Negative for chills and fever.   Gastrointestinal: Negative for abdominal pain, nausea and vomiting.   Genitourinary: Positive for dysuria and frequency. Negative for vaginal bleeding and vaginal discharge.   Musculoskeletal: Negative for back pain.       Vitals:    21 1741   BP: 122/84   BP Location: Left arm   Patient Position: Sitting   Cuff Size: Adult Regular   Pulse: 88   Temp: 97.1  F (36.2  C)   TempSrc: Tympanic   SpO2:  97%   Weight: 73.1 kg (161 lb 3.2 oz)       Physical Exam  Constitutional:       Appearance: Normal appearance. She is not ill-appearing or diaphoretic.   Cardiovascular:      Rate and Rhythm: Normal rate and regular rhythm.      Pulses: Normal pulses.      Heart sounds: Normal heart sounds.   Pulmonary:      Effort: Pulmonary effort is normal.      Breath sounds: Normal breath sounds.   Abdominal:      General: There is no distension.      Palpations: Abdomen is soft. There is no mass.      Tenderness: There is no abdominal tenderness. There is no right CVA tenderness, left CVA tenderness, guarding or rebound.   Skin:     General: Skin is warm.      Capillary Refill: Capillary refill takes less than 2 seconds.   Neurological:      Mental Status: She is alert and oriented to person, place, and time.   Psychiatric:         Behavior: Behavior normal.         Labs:  Results for orders placed or performed in visit on 12/21/21   UA Macro with Reflex to Micro and Culture - lab collect     Status: Abnormal    Specimen: Urine, Midstream   Result Value Ref Range    Color Urine Yellow Colorless, Straw, Light Yellow, Yellow    Appearance Urine Slightly Cloudy (A) Clear    Glucose Urine Negative Negative mg/dL    Bilirubin Urine Negative Negative    Ketones Urine Negative Negative mg/dL    Specific Gravity Urine 1.025 1.003 - 1.035    Blood Urine Trace (A) Negative    pH Urine 6.0 5.0 - 7.0    Protein Albumin Urine Negative Negative mg/dL    Urobilinogen Urine 0.2 0.2, 1.0 E.U./dL    Nitrite Urine Positive (A) Negative    Leukocyte Esterase Urine Moderate (A) Negative   Urine Microscopic Exam     Status: Abnormal   Result Value Ref Range    Bacteria Urine Many (A) None Seen /HPF    RBC Urine 0-2 0-2 /HPF /HPF    WBC Urine >100 (A) 0-5 /HPF /HPF    Squamous Epithelials Urine Moderate (A) None Seen /LPF         At the end of the encounter, I discussed results, diagnosis, medications. Discussed red flags for immediate return to  clinic/ER, as well as indications for follow up if no improvement. Patient understood and agreed to plan.     WINSTON Sagastume CNP

## 2021-12-23 LAB — BACTERIA UR CULT: ABNORMAL

## 2022-01-07 ENCOUNTER — VIRTUAL VISIT (OUTPATIENT)
Dept: FAMILY MEDICINE | Facility: CLINIC | Age: 37
End: 2022-01-07
Payer: COMMERCIAL

## 2022-01-07 DIAGNOSIS — E11.65 TYPE 2 DIABETES MELLITUS WITH HYPERGLYCEMIA, WITHOUT LONG-TERM CURRENT USE OF INSULIN (H): ICD-10-CM

## 2022-01-07 DIAGNOSIS — U07.1 INFECTION DUE TO 2019 NOVEL CORONAVIRUS: Primary | ICD-10-CM

## 2022-01-07 PROCEDURE — 99214 OFFICE O/P EST MOD 30 MIN: CPT | Mod: 95 | Performed by: PREVENTIVE MEDICINE

## 2022-01-07 RX ORDER — BENZONATATE 200 MG/1
200 CAPSULE ORAL 3 TIMES DAILY PRN
Qty: 30 CAPSULE | Refills: 0 | Status: SHIPPED | OUTPATIENT
Start: 2022-01-07 | End: 2024-08-21

## 2022-01-07 NOTE — PATIENT INSTRUCTIONS
"Discharge Instructions for COVID-19 Patients  You have--or may have--COVID-19. Please follow the instructions listed below.   If you have a weakened immune system, discuss with your doctor any other actions you need to take.    During this time    Stay in your own room, even for meals. Use your own bathroom if you can.    Stay away from others in your home. No hugging, kissing or shaking hands. No visitors.    Don't go to work, school or anywhere else.    Clean \"high touch\" surfaces often (doorknobs, counters, handles). Use household cleaning spray or wipes.    You'll find a full list of  on the EPA website: www.epa.gov/pesticide-registration/list-n-disinfectants-use-against-sars-cov-2.    Cover your mouth and nose with a mask or other face covering to avoid spreading germs.    Wash your hands and face often. Use soap and water.    Caregivers in these groups are at risk for severe illness due to COVID-19:  ? People 65 years and older  ? People who live in a nursing home or long-term care facility  ? People with chronic disease (lung, heart, cancer, diabetes, kidney, liver, immunologic)  ? People who have a weakened immune system, including those who:    Are in cancer treatment    Take medicine that weakens the immune system, such as corticosteroids    Had a bone marrow or organ transplant    Have an immune deficiency    Have poorly controlled HIV or AIDS    Are obese (body mass index of 40 or higher)    Smoke regularly    Caregivers should wear gloves while washing dishes, handling laundry and cleaning bedrooms and bathrooms.    Use caution when washing and drying laundry: Don't shake dirty laundry and use the warmest water setting that you can.    For more tips on managing your health at home, go to www.cdc.gov/coronavirus/2019-ncov/downloads/10Things.pdf.  How can I take care of myself at home?  1. Get lots of rest. Drink extra fluids (unless a doctor has told you not to).  2. Take Tylenol (acetaminophen) " for fever or pain. If you have liver or kidney problems, ask your family doctor if it's okay to take Tylenol.   Adults can take either:   ? 650 mg (two 325 mg pills) every 4 to 6 hours, or   ? 1,000 mg (two 500 mg pills) every 8 hours as needed.  ? Note: Don't take more than 3,000 mg in one day. Acetaminophen is found in many medicines (both prescribed and over-the-counter medicines). Read all labels to be sure you don't take too much.    Ibuprofen 400 mg every 6-8 hours with food.    For children, check the Tylenol bottle for the right dose. The dose is based on the child's age or weight.  3. If you have other health problems (like cancer, heart failure, an organ transplant or severe kidney disease): Call your specialty clinic if you don't feel better in the next 2 days.  4. Know when to call 911. Emergency warning signs include:  ? Trouble breathing or shortness of breath  ? Pain or pressure in the chest that doesn't go away  ? Feeling confused like you haven't felt before, or not being able to wake up  ? Bluish-colored lips or face  5. Your doctor may have prescribed a blood thinner medicine. Follow their instructions.  Where can I get more information?    [facility] - About COVID-19:   [email or web address]    CDC - What to Do If You're Sick: www.cdc.gov/coronavirus/2019-ncov/about/steps-when-sick.html    CDC - Ending Home Isolation: www.cdc.gov/coronavirus/2019-ncov/hcp/disposition-in-home-patients.html    CDC - Caring for Someone: www.cdc.gov/coronavirus/2019-ncov/if-you-are-sick/care-for-someone.html    [facility] - Interim Guidance for Hospital Discharge to Home: www.health.[facility]/diseases/coronavirus/hcp/hospdischarge.pdf    Below are the COVID-19 hotlines at the [place] Department of Health ([facility]). Interpreters are available.  ? For health questions: Call 371-302-9223 or 1-487.405.8213 (7 a.m. to 7 p.m.)  ? For questions about schools and childcare: Call 433-229-4883 or  3-132-536-8280 (7 a.m. to 7 p.m.)    For informational purposes only. Not to replace the advice of your health care provider. Clinically reviewed by Dr. Franky Lilly.   Copyright   2020 [facility]. All rights reserved. [employer] [id number] - REV 01/05/21.

## 2022-01-07 NOTE — PROGRESS NOTES
"Becky is a 36 year old who is being evaluated via a billable telephone visit.      What phone number would you like to be contacted at? Home number  How would you like to obtain your AVS? MyChart    Assessment & Plan     Infection due to 2019 novel coronavirus  -symptoms since 1/5/22 and home rapid test positive on 1/6/22  - benzonatate (TESSALON) 200 MG capsule  Dispense: 30 capsule; Refill: 0  -work note provided  -new CDC guidelines for isolation reviewed  -ER precautions  -per CDC:    \"Everyone who has presumed or confirmed COVID-19 should stay home and isolate from other people for at least 5 full days (day 0 is the first day of symptoms or the date of the day of the positive viral test for asymptomatic persons). They should wear a mask when around others at home and in public for an additional 5 days.\"    Discharge Instructions for COVID-19 Patients  You have--or may have--COVID-19. Please follow the instructions listed below.   If you have a weakened immune system, discuss with your doctor any other actions you need to take.  During this time    Stay in your own room, even for meals. Use your own bathroom if you can.    Stay away from others in your home. No hugging, kissing or shaking hands. No visitors.    Don't go to work, school or anywhere else.    Clean \"high touch\" surfaces often (doorknobs, counters, handles). Use household cleaning spray or wipes.    You'll find a full list of  on the EPA website: www.epa.gov/pesticide-registration/list-n-disinfectants-use-against-sars-cov-2.    Cover your mouth and nose with a mask or other face covering to avoid spreading germs.    Wash your hands and face often. Use soap and water.    Caregivers in these groups are at risk for severe illness due to COVID-19:  ? People 65 years and older  ? People who live in a nursing home or long-term care facility  ? People with chronic disease (lung, heart, cancer, diabetes, kidney, liver, immunologic)  ? People who " have a weakened immune system, including those who:    Are in cancer treatment    Take medicine that weakens the immune system, such as corticosteroids    Had a bone marrow or organ transplant    Have an immune deficiency    Have poorly controlled HIV or AIDS    Are obese (body mass index of 40 or higher)    Smoke regularly    Caregivers should wear gloves while washing dishes, handling laundry and cleaning bedrooms and bathrooms.    Use caution when washing and drying laundry: Don't shake dirty laundry and use the warmest water setting that you can.    For more tips on managing your health at home, go to www.cdc.gov/coronavirus/2019-ncov/downloads/10Things.pdf.  How can I take care of myself at home?  1. Get lots of rest. Drink extra fluids (unless a doctor has told you not to).  2. Take Tylenol (acetaminophen) for fever or pain. If you have liver or kidney problems, ask your family doctor if it's okay to take Tylenol.   Adults can take either:   ? 650 mg (two 325 mg pills) every 4 to 6 hours, or   ? 1,000 mg (two 500 mg pills) every 8 hours as needed.  ? Note: Don't take more than 3,000 mg in one day. Acetaminophen is found in many medicines (both prescribed and over-the-counter medicines). Read all labels to be sure you don't take too much.   For children, check the Tylenol bottle for the right dose. The dose is based on the child's age or weight.  3. If you have other health problems (like cancer, heart failure, an organ transplant or severe kidney disease): Call your specialty clinic if you don't feel better in the next 2 days.  4. Know when to call 911. Emergency warning signs include:  ? Trouble breathing or shortness of breath  ? Pain or pressure in the chest that doesn't go away  ? Feeling confused like you haven't felt before, or not being able to wake up  ? Bluish-colored lips or face  5. Your doctor may have prescribed a blood thinner medicine. Follow their instructions.  Where can I get more  information?    [facility] - About COVID-19:   [email or web address]    CDC - What to Do If You're Sick: www.cdc.gov/coronavirus/2019-ncov/about/steps-when-sick.html    CDC - Ending Home Isolation: www.cdc.gov/coronavirus/2019-ncov/hcp/disposition-in-home-patients.html    CDC - Caring for Someone: www.cdc.gov/coronavirus/2019-ncov/if-you-are-sick/care-for-someone.html    [facility] - Interim Guidance for Hospital Discharge to Home: www.health.[facility].[place]./diseases/coronavirus/hcp/hospdischarge.pdf    Below are the COVID-19 hotlines at the [place] Department of Health ([facility]). Interpreters are available.  ? For health questions: Call 693-063-0247 or 1-938.451.7254 (7 a.m. to 7 p.m.)  ? For questions about schools and childcare: Call 020-498-0230 or 1-534.260.1814 (7 a.m. to 7 p.m.)    For informational purposes only. Not to replace the advice of your health care provider. Clinically reviewed by Dr. Franky Lilly.   Copyright   2020 [facility]. All rights reserved. [employer] [id number] - REV 01/05/21.      Type 2 diabetes mellitus with hyperglycemia, without long-term current use of insulin (H)  -overdue for follow up, no medication and not checking glucose     Lab Results   Component Value Date    A1C 6.6 02/11/2019    A1C 7.6 11/30/2018           Prescription drug management  25 minutes spent on the date of the encounter doing chart review, history and exam, documentation and further activities per the note      Return in about 3 days (around 1/10/2022), or if symptoms worsen or fail to improve.    Marcella Gottlieb MD MPH    [facility] IGNACIO Pena is a 36 year old who presents for the following health issues :    HPI     Covid concerns:  -symptoms started 1/5/22  -started with a cough  -body aches+  -headaches+  -subjective fevers and chills  -no emesis  -no diarrhea  -no abdominal pain  -No sick contacts  -now  also is sick  -no travel  -went to  a  home and was told others got Covid too   -Covid J and J .   -no flu vaccine   -rapid test was positive yesterday  -does not work with patients  -history of diabetes, no follow up since 2019  -Tylenol Q4 hours.     History of diabetes:  -not checking glucose and no follow up.       Review of Systems   Constitutional, HEENT, cardiovascular, pulmonary, gi and gu systems are negative, except as otherwise noted.      Objective           Vitals:  No vitals were obtained today due to virtual visit.    Physical Exam   healthy, alert and no distress  PSYCH: Alert and oriented times 3; coherent speech, normal   rate and volume, able to articulate logical thoughts, able   to abstract reason, no tangential thoughts, no hallucinations   or delusions  Her affect is normal  RESP: No cough, no audible wheezing, able to talk in full sentences  Remainder of exam unable to be completed due to telephone visits        Phone call duration: 10 minutes

## 2022-01-07 NOTE — LETTER
January 7, 2022      Becky Hernandez  [address]   [place] [address]        To Whom It May Concern:    Becky Hernandez was evaluated on 1/7/22. Please excuse her from work through 1/11/22 with return to work on 1/12/22. Important to wear a mask for an additional 5 days.    Please let me know if any questions.         Sincerely,      Marcella Gottlieb MD MPH

## 2022-01-09 ENCOUNTER — HEALTH MAINTENANCE LETTER (OUTPATIENT)
Age: 37
End: 2022-01-09

## 2022-08-21 ENCOUNTER — HEALTH MAINTENANCE LETTER (OUTPATIENT)
Age: 37
End: 2022-08-21

## 2022-11-21 ENCOUNTER — HEALTH MAINTENANCE LETTER (OUTPATIENT)
Age: 37
End: 2022-11-21

## 2023-04-16 ENCOUNTER — HEALTH MAINTENANCE LETTER (OUTPATIENT)
Age: 38
End: 2023-04-16

## 2023-05-31 LAB
ABO/RH(D): NORMAL
ANTIBODY SCREEN: NEGATIVE
SPECIMEN EXPIRATION DATE: NORMAL

## 2023-06-01 ENCOUNTER — PRENATAL OFFICE VISIT (OUTPATIENT)
Dept: OBGYN | Facility: CLINIC | Age: 38
End: 2023-06-01
Payer: COMMERCIAL

## 2023-06-01 VITALS
OXYGEN SATURATION: 97 % | SYSTOLIC BLOOD PRESSURE: 109 MMHG | BODY MASS INDEX: 32.87 KG/M2 | WEIGHT: 160 LBS | DIASTOLIC BLOOD PRESSURE: 76 MMHG | HEART RATE: 89 BPM

## 2023-06-01 DIAGNOSIS — N89.8 VAGINAL DISCHARGE: ICD-10-CM

## 2023-06-01 DIAGNOSIS — N89.8 VAGINAL ITCHING: ICD-10-CM

## 2023-06-01 DIAGNOSIS — Z87.898 HISTORY OF PREDIABETES: ICD-10-CM

## 2023-06-01 DIAGNOSIS — Z32.01 PREGNANCY EXAMINATION OR TEST, POSITIVE RESULT: Primary | ICD-10-CM

## 2023-06-01 DIAGNOSIS — O24.419 GESTATIONAL DIABETES MELLITUS (GDM), ANTEPARTUM, GESTATIONAL DIABETES METHOD OF CONTROL UNSPECIFIED: ICD-10-CM

## 2023-06-01 LAB
ALBUMIN UR-MCNC: NEGATIVE MG/DL
APPEARANCE UR: CLEAR
BASOPHILS # BLD AUTO: 0 10E3/UL (ref 0–0.2)
BASOPHILS NFR BLD AUTO: 0 %
BILIRUB UR QL STRIP: NEGATIVE
CLUE CELLS: ABNORMAL
COLOR UR AUTO: YELLOW
EOSINOPHIL # BLD AUTO: 0.1 10E3/UL (ref 0–0.7)
EOSINOPHIL NFR BLD AUTO: 1 %
ERYTHROCYTE [DISTWIDTH] IN BLOOD BY AUTOMATED COUNT: 12.3 % (ref 10–15)
GLUCOSE UR STRIP-MCNC: 500 MG/DL
HCT VFR BLD AUTO: 38.9 % (ref 35–47)
HGB BLD-MCNC: 13 G/DL (ref 11.7–15.7)
HGB UR QL STRIP: NEGATIVE
IMM GRANULOCYTES # BLD: 0 10E3/UL
IMM GRANULOCYTES NFR BLD: 0 %
KETONES UR STRIP-MCNC: ABNORMAL MG/DL
LEUKOCYTE ESTERASE UR QL STRIP: NEGATIVE
LYMPHOCYTES # BLD AUTO: 2.6 10E3/UL (ref 0.8–5.3)
LYMPHOCYTES NFR BLD AUTO: 24 %
MCH RBC QN AUTO: 30.2 PG (ref 26.5–33)
MCHC RBC AUTO-ENTMCNC: 33.4 G/DL (ref 31.5–36.5)
MCV RBC AUTO: 90 FL (ref 78–100)
MONOCYTES # BLD AUTO: 0.5 10E3/UL (ref 0–1.3)
MONOCYTES NFR BLD AUTO: 5 %
NEUTROPHILS # BLD AUTO: 7.6 10E3/UL (ref 1.6–8.3)
NEUTROPHILS NFR BLD AUTO: 70 %
NITRATE UR QL: NEGATIVE
PH UR STRIP: 5.5 [PH] (ref 5–7)
PLATELET # BLD AUTO: 275 10E3/UL (ref 150–450)
RBC # BLD AUTO: 4.31 10E6/UL (ref 3.8–5.2)
SP GR UR STRIP: >=1.03 (ref 1–1.03)
TRICHOMONAS, WET PREP: ABNORMAL
UROBILINOGEN UR STRIP-ACNC: 0.2 E.U./DL
WBC # BLD AUTO: 10.9 10E3/UL (ref 4–11)
WBC'S/HIGH POWER FIELD, WET PREP: ABNORMAL
YEAST, WET PREP: ABNORMAL

## 2023-06-01 PROCEDURE — 86780 TREPONEMA PALLIDUM: CPT

## 2023-06-01 PROCEDURE — 87491 CHLMYD TRACH DNA AMP PROBE: CPT

## 2023-06-01 PROCEDURE — 86762 RUBELLA ANTIBODY: CPT

## 2023-06-01 PROCEDURE — 86901 BLOOD TYPING SEROLOGIC RH(D): CPT

## 2023-06-01 PROCEDURE — 99207 PR FIRST OB VISIT: CPT

## 2023-06-01 PROCEDURE — 87389 HIV-1 AG W/HIV-1&-2 AB AG IA: CPT

## 2023-06-01 PROCEDURE — 86850 RBC ANTIBODY SCREEN: CPT

## 2023-06-01 PROCEDURE — 87624 HPV HI-RISK TYP POOLED RSLT: CPT

## 2023-06-01 PROCEDURE — 81003 URINALYSIS AUTO W/O SCOPE: CPT

## 2023-06-01 PROCEDURE — 85025 COMPLETE CBC W/AUTO DIFF WBC: CPT

## 2023-06-01 PROCEDURE — 87210 SMEAR WET MOUNT SALINE/INK: CPT

## 2023-06-01 PROCEDURE — 87591 N.GONORRHOEAE DNA AMP PROB: CPT

## 2023-06-01 PROCEDURE — 36415 COLL VENOUS BLD VENIPUNCTURE: CPT

## 2023-06-01 PROCEDURE — 86900 BLOOD TYPING SEROLOGIC ABO: CPT

## 2023-06-01 PROCEDURE — G0145 SCR C/V CYTO,THINLAYER,RESCR: HCPCS

## 2023-06-01 PROCEDURE — 86803 HEPATITIS C AB TEST: CPT

## 2023-06-01 RX ORDER — NYSTATIN 100000 U/G
CREAM TOPICAL DAILY PRN
Qty: 15 G | Refills: 0 | Status: SHIPPED | OUTPATIENT
Start: 2023-06-01 | End: 2023-06-04

## 2023-06-01 RX ORDER — MICONAZOLE NITRATE 2 %
1 CREAM WITH APPLICATOR VAGINAL AT BEDTIME
Qty: 0.7 G | Refills: 0 | Status: SHIPPED | OUTPATIENT
Start: 2023-06-01 | End: 2024-08-21

## 2023-06-01 ASSESSMENT — PATIENT HEALTH QUESTIONNAIRE - PHQ9: SUM OF ALL RESPONSES TO PHQ QUESTIONS 1-9: 3

## 2023-06-01 NOTE — PATIENT INSTRUCTIONS
If you have any questions regarding your visit, Please contact your care team.     [employer]: [phone]  To Schedule an Appointment 24/7  Call: [phone]PHONE [facility]   Madelin Jimy, DNP, APRN, NP-BC    Maryse - Surgery Scheduler  Gabrielle - Surgery Scheduler    TERESITA Hadley, TERESITA Shi RN   Monday- [place]  8:00 am - 12:00 pm    Tuesday- [place]  8:00 am - 4:30 pm    Wednesday- [place]  8:00 am - 4:30 pm    Thursday- [place]  8:00 am - 4:30 pm    Friday- [place]  8:00 am - 4:30 pm [facility]  [address] [address] [address]  Phone: [phone]   Fax: [phone]     Imaging Scheduling-All Locations [phone]    [facility]  [address] [place] [address]     Urgent Care locations:  [facility] Monday-Friday   10 am - 8 pm  Saturday and Sunday   9 am - 5 pm [phone]   [facility]:  [phone]    If you need a medication refill, please contact your pharmacy. Please allow 3 business days for your refill to be completed.  As always, Thank you for trusting us with your healthcare needs!  see additional instructions from your care team below

## 2023-06-01 NOTE — PROGRESS NOTES
SUBJECTIVE:     HPI:    This is a 38 year old female patient,  who presents for her first obstetrical visit.    MIGUE:  1/3/2024 based on her LMP of 2023. She has not yet had an early OB US.  She is 9w1d weeks.    Her cycles are regular.    Her last menstrual period was normal.     Since her LMP, she has experienced  nausea and fatigue).   She denies nausea and vaginal bleeding.    Patient reports 3-4 weeks of vaginal itching irritation and thick white clumpy vaginal discharge.    Additional History:    x 5  OB History    Para Term  AB Living   6 5 5 0 1 5   SAB IAB Ectopic Multiple Live Births   0 0 0 0 5      # Outcome Date GA Lbr Rikki/2nd Weight Sex Delivery Anes PTL Lv   6 Term 17 38w5d 03:00 / 00:10 3.402 kg (7 lb 8 oz) F  None  BELEM      Name: Sierra      Apgar1: 8  Apgar5: 9   5 Term 10/02/15 40w0d  3.629 kg (8 lb) M    BELEM      Name: Winston   4 AB 10/23/14 9w0d    AB, MISSED         Birth Comments: D&C   3 Term /07    M   N BELEM   2 Term 02/15/04    M   Y BELEM   1 Term 00    M   N BELEM     Past Medical History:   Diagnosis Date     Diabetes (H)      NO ACTIVE PROBLEMS          Lab Results   Component Value Date    PAP NIL 2018      Next PAP due: 2023     Father of baby: no health issues    Have you travelled during the pregnancy?No  Have your sexual partner(s) travelled during the pregnancy?No    Planned Pregnancy: No  Marital Status: Single  Occupation: Med tech - [employer]  Living in Household: Spouse and Children    Patient Active Problem List   Diagnosis     Type 2 diabetes mellitus with hyperglycemia, without long-term current use of insulin (H)     Hyperlipidemia LDL goal <100     Past Surgical History:   Procedure Laterality Date     DILATION AND CURETTAGE SUCTION N/A 10/23/2014    Procedure: DILATION AND CURETTAGE SUCTION;  Surgeon: Ulisses Edwards MD;  Location: [facility] OR     HC INSERTION INTRAUTERINE DEVICE      Mirena      HC REMOVE INTRAUTERINE DEVICE  2014      Social History     Tobacco Use     Smoking status: Never     Smokeless tobacco: Never   Vaping Use     Vaping status: Not on file   Substance Use Topics     Alcohol use: No         Negative family history of: Asthma, C.A.D., Diabetes, Hypertension, Cerebrovascular Disease, Breast Cancer, Cancer - colorectal, Prostate Cancer            Current Outpatient Medications   Medication Sig     acetaminophen (TYLENOL) 500 MG tablet Take 500-1,000 mg by mouth every 6 hours as needed for mild pain     benzonatate (TESSALON) 200 MG capsule Take 1 capsule (200 mg) by mouth 3 times daily as needed for cough     No current facility-administered medications for this visit.     No Known Allergies      ROS: 10 point review of systems completed with no abnormals except as noted in HPI.      OBJECTIVE:     EXAM:  There were no vitals taken for this visit.   There is no height or weight on file to calculate BMI.    GENERAL: healthy, alert and no distress  EYES: Eyes grossly normal to inspection, PERRL and conjunctivae and sclerae normal  NECK: no adenopathy, no asymmetry, masses, or scars and thyroid normal to palpation  RESP: lungs clear to auscultation - no rales, rhonchi or wheezes  BREAST: normal without masses, tenderness or nipple discharge and no palpable axillary masses or adenopathy  CV: regular rate and rhythm, normal S1 S2, no S3 or S4, no murmur, click or rub, no peripheral edema and peripheral pulses strong  ABDOMEN: soft, nontender, no hepatosplenomegaly, no masses and bowel sounds normal   (female): normal female external genitalia, normal urethral meatus, vaginal mucosa pink, moist, well rugated, and normal cervix/adnexa/uterus without masses POSITIVE for erythema of the vaginal introitus and moderate amounts of thick, white, clumping vaginal discharge  MS: no gross musculoskeletal defects noted, no edema  SKIN: no suspicious lesions or rashes  NEURO: Normal strength and  tone, mentation intact and speech normal  PSYCH: mentation appears normal, affect normal/bright  LYMPH: no cervical, supraclavicular, axillary, or inguinal adenopathy    ASSESSMENT/PLAN:     Becky is a 38 year old year old  at Unknown who presents today for her initial OB visit.    (Z32.01) Pregnancy examination or test, positive result  (primary encounter diagnosis)  Plan: ABO/Rh type and screen,    CBC with Platelets & Differential,    Chlamydia trachomatis PCR,    HIV Antigen Antibody Combo,    Neisseria gonorrhoeae PCR,    Rubella Antibody IgG,    Pap screen with HPV - recommended age 30 - 65 years,    UA Macroscopic with reflex to Microscopic and Culture,    Hepatitis C Screen Reflex to HCV RNA Quant and Genotype,    Treponema Abs w Reflex to RPR and Titer,    US OB <14 Weeks w Transvaginal Single  PNV  NOB Labs today  Discussed routine prenatal care, quad screen, GCT, anatomy scan at ~19 weeks, frequency of visits.  Discussed first trimester screen and she will consider   Delivery hospital: [facility] [place]  Follow up in 4-6 weeks for return OB visit.  Recommended weight gain for pregnancy: 15-25 lbs.    (N89.8) Vaginal itching  (N89.8) Vaginal discharge  Comment: Erythema of the introitus and moderate amounts of thick, clumpy white discharge   Plan: Wet prep - Clinic Collect,   miconazole (MICATIN) 2 % cream, nystatin (MYCOSTATIN) [id number] UNIT/GM external cream PRN x 3 days for itching   miconazole (MICATIN) 2 % cream intravaginal x 7 days    (Z87.898) History of prediabetes  Plan: Glucose tolerance, gest screen, 1 hour    Discussed physician coverage, tertiary support, diet, exercise, weight gain, schedule of visits, routine and indicated ultrasounds, and childbirth education.    Options for  testing for chromosomal and birth defects were discussed with the patient. Diagnostic tests include CVS and Amniocentesis. We discussed that these tests are definitive but invasive and do carry a risk of fetal loss.    Screening tests include nuchal translucency/blood marker testing in the first trimester and quad screening in the second trimester. We discussed that these are screening tests and not diagnostic tests and that false positives and negatives are a distinct possibility.     Discussed aspirin use in pregnancy.  Low-dose aspirin prophylaxis can be beneficial in women at high risk of developing preeclampsia.  I generally recommend we begin aspirin at about 12-13 weeks gestation and continue until at least 36 weeks.    Women with at least one of the following conditions are considered high risk for developing preeclampsia: Previous pregnancy with preeclampsia,  multifetal-gestation, chronic hypertension, diabetes mellitus, chronic kidney disease, autoimmune disease, and IVF.    Women with more than 1 of the following conditions may also consider low-dose aspirin prophylaxis in pregnancy: Nulliparity, BMI greater than 30, family history of preeclampsia (mother or sister), AMA, socio-demographic characteristics, personal risk factors.    Patient has been diagnosed with prediabetes.    Patient DOES meet the above criteria.  Discussed risks and benefits of low dose Asprin therapy and she elects to proceed.     30 minutes was spent face to face with the patient today discussing her history, diagnosis, and follow-up plan as noted above.        Madelin Romero, WINSTON CNP

## 2023-06-02 LAB
C TRACH DNA SPEC QL NAA+PROBE: NEGATIVE
HCV AB SERPL QL IA: NONREACTIVE
HIV 1+2 AB+HIV1 P24 AG SERPL QL IA: NONREACTIVE
N GONORRHOEA DNA SPEC QL NAA+PROBE: NEGATIVE
RUBV IGG SERPL QL IA: 0.65 INDEX
RUBV IGG SERPL QL IA: NORMAL
T PALLIDUM AB SER QL: NONREACTIVE

## 2023-06-05 ENCOUNTER — APPOINTMENT (OUTPATIENT)
Dept: LAB | Facility: CLINIC | Age: 38
End: 2023-06-05
Payer: COMMERCIAL

## 2023-06-05 LAB — GLUCOSE 1H P 50 G GLC PO SERPL-MCNC: 276 MG/DL (ref 70–129)

## 2023-06-05 PROCEDURE — 82950 GLUCOSE TEST: CPT

## 2023-06-05 PROCEDURE — 36415 COLL VENOUS BLD VENIPUNCTURE: CPT

## 2023-06-06 LAB
BKR LAB AP GYN ADEQUACY: NORMAL
BKR LAB AP GYN INTERPRETATION: NORMAL
BKR LAB AP HPV REFLEX: NORMAL
BKR LAB AP PREVIOUS ABNORMAL: NORMAL
PATH REPORT.COMMENTS IMP SPEC: NORMAL
PATH REPORT.COMMENTS IMP SPEC: NORMAL
PATH REPORT.RELEVANT HX SPEC: NORMAL

## 2023-06-07 LAB
HUMAN PAPILLOMA VIRUS 16 DNA: NEGATIVE
HUMAN PAPILLOMA VIRUS 18 DNA: NEGATIVE
HUMAN PAPILLOMA VIRUS FINAL DIAGNOSIS: NORMAL
HUMAN PAPILLOMA VIRUS OTHER HR: NEGATIVE

## 2023-06-29 ENCOUNTER — ANCILLARY PROCEDURE (OUTPATIENT)
Dept: ULTRASOUND IMAGING | Facility: CLINIC | Age: 38
End: 2023-06-29
Payer: COMMERCIAL

## 2023-06-29 DIAGNOSIS — Z32.01 PREGNANCY EXAMINATION OR TEST, POSITIVE RESULT: ICD-10-CM

## 2023-06-29 PROCEDURE — 76801 OB US < 14 WKS SINGLE FETUS: CPT | Performed by: STUDENT IN AN ORGANIZED HEALTH CARE EDUCATION/TRAINING PROGRAM

## 2023-06-29 PROCEDURE — 76817 TRANSVAGINAL US OBSTETRIC: CPT | Performed by: STUDENT IN AN ORGANIZED HEALTH CARE EDUCATION/TRAINING PROGRAM

## 2023-07-05 NOTE — PATIENT INSTRUCTIONS
Healthy Eating Habits During Pregnancy  It s important to develop healthy eating habits while you are pregnant, for you as well as for your baby. Here are some ways to stay healthy.   Aim for a healthy weight  A slow, steady rate of weight gain is often best. After the first trimester, you may gain about a pound a week. If you were overweight before pregnancy, you need to gain fewer pounds. Your healthcare provider can give you a healthy weight goal for your pregnancy.   Don t diet  Now is not the time to diet. You may not get enough of the nutrients you and your baby need. Instead, learn how to be a healthy eater. Start by doing it for your baby. Soon, you may do it for yourself.   Vitamins and supplements  Talk with your healthcare provider about taking these and other prenatal vitamins and supplements.   Iron makes the extra blood you need now.  Calcium and vitamin D help build and keep strong bones.  Folic acid helps prevent certain birth defects.  Iodine helps the thyroid work right.  Some vitamins may not be safe to take. Your healthcare provider will tell you which ones to avoid.  Fluids    Drink at least 8 to 10 cups of fluid daily. Your baby needs fluids. Fluids also decrease constipation, flush out toxins and waste, limit swelling, and help prevent bladder infections. Water is best. Other good choices are:   Water or seltzer water with a slice of lemon or lime. (These can also help ease an upset stomach.)  Clear soups that are low in salt  Low-fat or fat-free milk, soy or rice milk with calcium added  Popsicles or gelatin  Things to avoid  Some things might harm your growing baby. Don t eat or drink:   Alcohol  Unpasteurized dairy foods and juices  Raw or undercooked meat, poultry, fish, or eggs  Unwashed fruits and vegetables  Prepared meats, like deli meats or hot dogs, unless heated until steaming hot  Fish that are high in mercury, like shark, swordfish, david mackerel, tilefish, and albacore  tuna  Things to limit  Ask your healthcare provider whether it s safe to eat or drink:   Caffeine  Artificial sweeteners  Organ meats  Certain types of fish  Fish and shellfish that contain mercury in lower amounts, like shrimp, canned light tuna, salmon, pollock, and catfish  StayWell last reviewed this educational content on 7/1/2021 2000-2023 [employer]. All rights reserved. This information is not intended as a substitute for professional medical care. Always follow your healthcare professional's instructions.          Pregnancy: Your Weight  Being a healthy weight is important for both you and your baby. The weight you gain now is not just extra fat. It is also the weight of your baby. And it is the increased blood and fluids to support the baby. A slow, steady rate of gain is best. How much you should gain depends on your weight before getting pregnant. Check with your healthcare provider to find out what is right for you.      Your weight will be checked regularly by your healthcare provider.     Talk to your healthcare provider if you have any questions.   If you gain too much  Gaining too much weight might cause you to feel tired, or you could have a harder pregnancy or birth. If you and your healthcare provider decide you re gaining too much:   Eat fewer fats and sugars. Instead, eat fruit, vegetables, and whole-grain foods.  Drink plenty of water between meals.  Get at least 20 minutes of light exercise, like walking, each day.  Don t diet. You might not get enough of the nutrients you and your baby need.  Keep a food diary to help you gauge what and how much you are eating.  If you're not gaining enough  If you don t gain enough, your baby could be too small or have health problems. Women tend to gain most of their weight in the second and third trimesters. For now:   Eat many types of foods. Make sure you get enough calcium, protein, and carbohydrates.  Don t skip meals.  Eat healthy  snacks.  Pick nutrient-dense, high-calorie healthy food like trail mix or protein shakes.  See a dietitian for help.  Talk to your healthcare provider if you have had an eating disorder or problems with certain foods.  The following are ways to get more calories:  Eat breakfast every day. Peanut butter or a slice of cheese on toast can give you an extra protein boost.  Snack between meals. Yogurt and dried fruits can provide protein, calcium, and minerals.  Try to eat more foods that are high in good fats, like nuts, fatty fish, avocados, and olive oil.  Drink juices made from real fruit that are high in vitamin C or beta-carotene, like grapefruit juice, orange juice, papaya nectar, apricot nectar, and carrot juice.  Don't eat junk food, like foods high in sugar.  [employer] last reviewed this educational content on 7/1/2021 2000-2023 [employer]Well [employer]. All rights reserved. This information is not intended as a substitute for professional medical care. Always follow your healthcare professional's instructions.        You have been provided the CDC Warning Signs in Pregnancy document.    Additional copies can be found here: [email or web address]        If you have any questions regarding your visit, Please contact your care team.     [facility]: [phone]  To Schedule an Appointment 24/7  Call: [phone] TE[facility]ONE NUM[facility]   Madelin Ahn, HOMAR, APRN, WHAMOS-BC    Maryse - Surgery Scheduler  Gabrielle - Surgery Scheduler    TERESITA Hadley RN Kylie, RN   Monday- [place]  8:00 am - 12:00 pm    [facility]  [facility]:00 [facility] - 4:30 pm    [facility]  8:00 am - 4:30 pm    Thursday[facility]  8:00 am - 4:30 pm    [facility]  8:00 am - 4:30 pm [facility]  [address] [place] [address]  Phone: [phone]   Fax: [phone]     Imaging Scheduling-All Locations [phone]    [place] [address]  [place] [address]     Urgent Care locations:  [facility] Monday-Friday   10 am - 8 pm  Saturday and Sunday   9 am - 5 pm [phone]   [facility] Labor and Delivery:  [phone]    If you need a medication refill, please contact your pharmacy. Please allow 3 business days for your refill to be completed.  As always, Thank you for trusting us with your healthcare needs!  see additional instructions from your care team below    Call Diabetic Education at [phone]

## 2023-07-06 ENCOUNTER — PRENATAL OFFICE VISIT (OUTPATIENT)
Dept: OBGYN | Facility: CLINIC | Age: 38
End: 2023-07-06
Payer: COMMERCIAL

## 2023-07-06 ENCOUNTER — TRANSCRIBE ORDERS (OUTPATIENT)
Dept: MATERNAL FETAL MEDICINE | Facility: CLINIC | Age: 38
End: 2023-07-06

## 2023-07-06 VITALS
HEIGHT: 58 IN | OXYGEN SATURATION: 98 % | HEART RATE: 85 BPM | WEIGHT: 163.8 LBS | SYSTOLIC BLOOD PRESSURE: 108 MMHG | DIASTOLIC BLOOD PRESSURE: 73 MMHG | BODY MASS INDEX: 34.38 KG/M2

## 2023-07-06 DIAGNOSIS — O26.90 PREGNANCY RELATED CONDITION, ANTEPARTUM: Primary | ICD-10-CM

## 2023-07-06 DIAGNOSIS — Z34.82 PRENATAL CARE, SUBSEQUENT PREGNANCY IN SECOND TRIMESTER: Primary | ICD-10-CM

## 2023-07-06 DIAGNOSIS — E11.65 TYPE 2 DIABETES MELLITUS WITH HYPERGLYCEMIA, WITHOUT LONG-TERM CURRENT USE OF INSULIN (H): ICD-10-CM

## 2023-07-06 PROCEDURE — 99207 PR PRENATAL VISIT: CPT

## 2023-07-06 RX ORDER — PRENATAL VIT/IRON FUM/FOLIC AC 27MG-0.8MG
1 TABLET ORAL DAILY
Qty: 90 TABLET | Refills: 3 | Status: SHIPPED | OUTPATIENT
Start: 2023-07-06 | End: 2024-08-21

## 2023-07-06 RX ORDER — ASPIRIN 81 MG/1
81 TABLET ORAL DAILY
Qty: 90 TABLET | Refills: 2 | Status: SHIPPED | OUTPATIENT
Start: 2023-07-06 | End: 2024-08-21

## 2023-07-06 NOTE — PROGRESS NOTES
Becky is a 38 year old   at 14w1d gestation who presents to the clinic for a routine prenatal visit.    Concerns at this visit:  Needs Rx for PNV. This was sent.    Patient denies vaginal bleeding, leaking of fluid from the vagina, or uterine contractions.      Vulvovaginal Candida (VVC: Vaginal itching and irritation and thick white clumping discharge. Treated for VVC at initial OB visit. Becky reports that symptoms completely resolved following treatment.    AMA / BMI 34 /GDM: Type II DM at baseline though she has not been managing this for quite some time. Early 50 gm/ 1 hour GCT result was significantly elevated at 276. Diabetic Education/Management referral placed. They have been unsuccessful in reaching patient.  - Patient provided with DM Ed contact number and strongly encouraged to f/u. Reviewed risks of DM in pregnancy for mother and baby. Patient agrees to call them to schedule appointment.  - ASA Pre-E prophylaxis ordered and will begin  - MFM referral ordered     Discussed genetic screening options. Patient declines..  Will schedule anatomy ultrasound.  RTC 4 weeks.    WINSTON Donnelly CNP

## 2023-07-17 ENCOUNTER — VIRTUAL VISIT (OUTPATIENT)
Dept: EDUCATION SERVICES | Facility: CLINIC | Age: 38
End: 2023-07-17
Payer: COMMERCIAL

## 2023-07-17 ENCOUNTER — TELEPHONE (OUTPATIENT)
Dept: EDUCATION SERVICES | Facility: CLINIC | Age: 38
End: 2023-07-17

## 2023-07-17 DIAGNOSIS — Z34.82 PRENATAL CARE, SUBSEQUENT PREGNANCY IN SECOND TRIMESTER: ICD-10-CM

## 2023-07-17 DIAGNOSIS — E11.65 TYPE 2 DIABETES MELLITUS WITH HYPERGLYCEMIA, WITHOUT LONG-TERM CURRENT USE OF INSULIN (H): Primary | ICD-10-CM

## 2023-07-17 DIAGNOSIS — O24.419 GESTATIONAL DIABETES MELLITUS (GDM), ANTEPARTUM, GESTATIONAL DIABETES METHOD OF CONTROL UNSPECIFIED: ICD-10-CM

## 2023-07-17 DIAGNOSIS — O24.419 GESTATIONAL DIABETES: Primary | ICD-10-CM

## 2023-07-17 PROCEDURE — G0109 DIAB MANAGE TRN IND/GROUP: HCPCS | Mod: 95

## 2023-07-17 RX ORDER — LANCETS
EACH MISCELLANEOUS
Qty: 100 EACH | Refills: 6 | Status: SHIPPED | OUTPATIENT
Start: 2023-07-17 | End: 2024-08-21

## 2023-07-17 NOTE — TELEPHONE ENCOUNTER
Spoke with Becky this morning about this afternoon's GDM class - explained that the class is for patients with GDM, not specific to T2DM in pregnancy. She asked what the numbers would be for T2DM and I explained her previous A1C results form 2018 & 2019 showed that she had diabetes. Briefly reviewed risk for complications is much greater with pre-existing DM. We do not have sooner access for diabetes ed than this afternoon so I recommended she attend the Zoom class today so she can get started with testing blood sugars and adjusting her diet. Then we moved up her follow up appointment for an in person visit next week, Monday, at [facility] where we can review insulin dosing (if needed), CGM options, and get her plugged in with Endocrinology ASAP. Dr. Pringle does not have availability for a few weeks but I will place an Endocrinology referral after seeing her. I did mention to her that we will want her to work with Endocrinology, a diabetes specialist, during this pregnancy and she was agreeable.     Hazel Madrigal, ARELY, LD, [facility]

## 2023-07-17 NOTE — GROUP NOTE
Diabetes Diabetes Self-Management Education & Support  7/17/2023  [facility]    Virtual Group class via Medical Zoom    Start and End Time  Start Time: 1300  End Time: 1411    SUBJECTIVE / OBJECTIVE  Cultural Influences/Ethnic Background:  Not  or     Estimated Date of Delivery: Tyshawn 3, 2024    1 hour OGTT  Lab Results   Component Value Date    GLU1 276 (H) 06/05/2023       3 hour OGTT    Fasting  Lab Results   Component Value Date    GLF 76 02/18/2017       1 hour  Lab Results   Component Value Date    GL1 159 02/18/2017       2 hour  Lab Results   Component Value Date    GL2 145 02/18/2017       3 hour  Lab Results   Component Value Date    GL3 148 (H) 02/18/2017       Gestational Diabetes Self-Management Education & Support    Educational topics covered today:  GDM diagnosis, pathophysiology, Risks and Complications of GDM, Means of controlling GDM, Using a Blood Glucose Monitor, Blood Glucose Goals, Logging and Interpreting Glucose Results, Ketone Testing, When to Call a Diabetes Educator or OB Provider, Healthy Eating During Pregnancy, Counting Carbohydrates, Meal Planning for GDM, and Physical Activity    Educational materials provided today:   [place] Understanding Gestational Diabetes  GDM Log Book  Sharps Disposal  Care After Delivery  Pt verbalized understanding of concepts discussed and recommendations provided today.     PLAN:  Check glucose 4 times daily, before breakfast and 1 hour after each meal.     Check Ketones daily for one week, if negative, reduce testing to once a week.     Follow consistent CHO meal plan, eat CHO and protein/fat at all meals/snacks.    Call/e-mail/[employer] message diabetes educator if 3 or more blood sugars are above the goal in 1 week or if ketones are positive or with questions/concerns.        Diabetes Educator:  Rupali Hung RD

## 2023-07-19 ENCOUNTER — TELEPHONE (OUTPATIENT)
Dept: ENDOCRINOLOGY | Facility: CLINIC | Age: 38
End: 2023-07-19
Payer: COMMERCIAL

## 2023-07-19 NOTE — TELEPHONE ENCOUNTER
Communication Summary: LVM and sent MyC    Appointment type: New Diabetes  Provider: Anjana Ferrer  Return date: See below  Speciality phone number: [phone]  Additional appointment(s) needed: See below  Additional notes: Patient needs monthly MD or PA appts per GDM protocol. Contacted pt to schedule follow up in August.     Joseph Shah on 7/19/2023 at 9:13 AM

## 2023-07-20 NOTE — CONFIDENTIAL NOTE
RECORDS RECEIVED FROM: internal    DATE RECEIVED: 7.31.23    NOTES (FOR ALL VISITS) STATUS DETAILS   OFFICE NOTES from referring provider internal  Madelin Romero APRN CNP   OFFICE NOTES from other specialist internal  7/17/23 Educ   7.6.23 Heather   1/7/22 Bull      MEDICATION LIST internal     IMAGING        LABS     DIABETES: HBGA1C, CREATININE, FASTING LIPIDS, MICROALBUMIN URINE, POTASSIUM, TSH, T4    THYROID: TSH, T4, CBC, THYRODLONULIN, TOTAL T3, FREE T4, CALCITONIN, CEA internal  Glucose tolera- 6.5.23   Cbc- 6.1.23

## 2023-07-24 ENCOUNTER — ALLIED HEALTH/NURSE VISIT (OUTPATIENT)
Dept: EDUCATION SERVICES | Facility: CLINIC | Age: 38
End: 2023-07-24
Payer: COMMERCIAL

## 2023-07-24 DIAGNOSIS — E11.65 TYPE 2 DIABETES MELLITUS WITH HYPERGLYCEMIA, WITHOUT LONG-TERM CURRENT USE OF INSULIN (H): Primary | ICD-10-CM

## 2023-07-24 DIAGNOSIS — Z34.82 PRENATAL CARE, SUBSEQUENT PREGNANCY IN SECOND TRIMESTER: ICD-10-CM

## 2023-07-24 PROCEDURE — G0108 DIAB MANAGE TRN  PER INDIV: HCPCS | Performed by: DIETITIAN, REGISTERED

## 2023-07-24 RX ORDER — ACYCLOVIR 400 MG/1
1 TABLET ORAL
Qty: 3 EACH | Refills: 5 | Status: SHIPPED | OUTPATIENT
Start: 2023-07-24 | End: 2024-08-21

## 2023-07-24 NOTE — LETTER
7/24/2023         RE: Becky Hernandez  [address]  [place] [address]        Dear Colleague,    Thank you for referring your patient, Becky Hernandez, to the [facility]. Please see a copy of my visit note below.    Diabetes Self-Management Education & Support    Presents for: Follow-up    ASSESSMENT:  Becky attended GDM class last week, although was called p/t class to explain that she has pre-existing DM, needs endocrinology support and more intenstive follow up. A meter was sent to pharmacy but she only just picked it up and hasn't yet started testing her blood sugars. She is concerned about adjusting her diet, after class. We reviewed this in detail today as well as blood sugar testing schedule and use of continuous glucose monitor. She is agreeable to using this, feels more confident in diet adjustments. We did review risks for her and baby with pre-existing DM during pregnancy.      Opportunities for ongoing education and support in diabetes-self management were discussed.    Pt verbalized understanding of concepts discussed and recommendations provided today.       INTERVENTION:  Patient presented for Type 2 Diabetes. Advised to check glucose at least 6 times a day, before each meal and 1 hour after the start of each meal., Advised to check urine ketones daily in the morning until negative for 7 days in a row, then reduce to checking once every 7 days., and Reviewed carbohydrate counting, label reading, and meal plan of 30-45g grams carb at breakfast, 45-60g grams of carb at lunch, 45-60g grams of carb at dinner, and 45-60g grams of carb at snacks. Encouraged balanced meals, including protein and fat with carb at all meals and snacks.    We watched [employer]'s video on insulin dosing using a pen and discussed in detail likelihood that she will need some insulin during this pregnancy.     Referral to care team: Endocrinologist - referral placed last week and she has  appointment on 7/31 with Dr. Pringle      Education Materials Provided:  GDM packet & 2 GDM logbooks      PLAN:  Check glucose 4 times daily, before breakfast and 1 hour after each meal.      Check Ketones daily for one week, if negative, reduce testing to once a week.      Follow consistent CHO meal plan, eat CHO and protein/fat at all meals/snacks.    Start Dexcom G7 once available and continue to track blood sugars fasting & 1 or 2-hr post prandial     See Care Plan for co-developed, patient-stated behavior change goals.    Follow up with Endocrinology in 1 week, CDCES in 2 weeks     AVS printed and provided to patient today.    SUBJECTIVE/OBJECTIVE:  Presents for: Follow-up  Accompanied by: Self  Diabetes education in the past 24mo: Yes  Focus of Visit: Patient Unsure  Diabetes type: Type 2  Date of diagnosis: 2018  Disease course: Other  How confident are you filling out medical forms by yourself:: Not Assessed  Diabetes management related comments/concerns: Not really sure how to adjust my diet  Transportation concerns: No  Difficulty affording diabetes medication?: No  Difficulty affording diabetes testing supplies?: No  Other concerns:: English as a second language  Cultural Influences/Ethnic Background:  Not  or       Diabetes Symptoms & Complications     Complications assessed today?: No    Patient Problem List and Family Medical History reviewed for relevant medical history, current medical status, and diabetes risk factors.    Vitals:  LMP 03/29/2023 (Exact Date)     Pre pregnancy weight: not assessed #      Estimated Date of Delivery: Tyshawn 3, 2024    Labs:  Lab Results   Component Value Date    A1C 6.6 02/11/2019     Lab Results   Component Value Date     11/30/2018     Lab Results   Component Value Date    LDL 74 02/11/2019     HDL Cholesterol   Date Value Ref Range Status   02/11/2019 40 (L) >49 mg/dL Final   ]  GFR Estimate   Date Value Ref Range Status   11/30/2018 >90 >60  mL/min/1.7m2 Final     Comment:     Non  GFR Calc     GFR Estimate If Black   Date Value Ref Range Status   11/30/2018 >90 >60 mL/min/1.7m2 Final     Comment:      GFR Calc     Lab Results   Component Value Date    CR 0.72 11/30/2018     No results found for: MICROALBUMIN    Last 3 BP:   BP Readings from Last 3 Encounters:   07/06/23 108/73   06/01/23 109/76   12/21/21 122/84       History   Smoking Status     Never   Smokeless Tobacco     Never       Healthy Eating  Healthy Eating Assessed Today: Yes  Cultural/Taoist diet restrictions?: No  Meal planning/habits: None  Meals include: Breakfast, Morning Snack, Lunch, Dinner, Evening Snack  Breakfast: rice & meat, water  Lunch: Ramen noodles  Dinner: rice & meat  Snacks: AM - peach or apple OR banana; HS - alfreda OR banana  Beverages: Water, Milk  Has patient met with a dietitian in the past?: Yes      Being Active  Being Active Assessed Today: Yes  Exercise:: Yes  How intense was your typical exercise? : Light (like stretching or slow walking) (some walking)  Barrier to exercise: None    Monitoring  Monitoring Assessed Today: Yes  Did patient bring glucose meter to appointment? : No  Blood Glucose Meter: Unknown  Times checking blood sugar at home (number): Never    NOT ASSESSED     Checking for urine ketones? no    Taking Medications      Any changes to above medications since becoming pregnant: NOT ASSESSED     Taking Medication Assessed Today: Yes  Current Treatments: None    Problem Solving  Problem Solving Assessed Today: No  Is the patient at risk for hypoglycemia?: No  Is the patient at risk for DKA?: No  Does patient have severe weather/disaster plan for diabetes management?: No  Does patient have sick day plan for diabetes management?: No              Reducing Risks  Reducing Risks Assessed Today: No    Healthy Coping  Healthy Coping Assessed Today: Yes  Emotional response to diabetes: Ready to learn, Concern for health and  well-being  Stage of change: PREPARATION (Decided to change - considering how)    Patient Activation Measure Survey Score:      11/30/2018     3:47 PM   ELISE Score (Last Two)   ELISE Raw Score 32   Activation Score 62.6   ELISE Level 3       Care Plan and Education Provided:  Insulin administration technique taught today. Patient verbalized understanding.    Hazel Madrigal RD, LD, [facility]     Time Spent: 45 minutes  Encounter Type: Individual    Any diabetes medication dose changes were made via the CDE Protocol per the patient's referring provider. A copy of this encounter was shared with the provider.

## 2023-07-24 NOTE — PATIENT INSTRUCTIONS
You are seeing Dr. Pringle, Endocrinology next week on 7/31 at 10:45a. Plan for sending a [employer] message to me every Monday with a picture of blood sugar results.     You should hear from your pharmacy when the Dexcom G7 is ready to be picked up. Visit www.dexcom.com to watch videos and get instruction on how to get started. You will need to download the Dexcom G7 and the Dexcom Clarity apps to track those blood sugars and I can give you instruction on how to link your results to our practice in the future.     1. Check blood sugar 4 times a day, before breakfast and 1 hour after the start of each meal.     2. Check urine ketones when you wake up every morning for 7 days. If negative everyday, reduce testing to once a week.    3. Follow the recommended meal plan: eat something every 2-3 hours, include protein/fat and carbohydrate at every meal and snack, have 30-45 carbs at breakfast, 45-60g carbs at lunch, 45-60g carbs at supper, 15-30g carbs at 3 snacks per day.     4. Add activity to every day, try walking or being active after each meal to help control blood sugar levels.    5.Call or send a [employer] message to your educator if 3 or more blood sugars are above goal in 1 week, you have an elevated ketone result (trace or higher), or with questions or concerns.    [place] [facility] for the [facility]:  For Your Diabetes Education or Nutrition Appointments Call:  [phone]   For Diabetes Education and Nutrition Related Questions:   Phone: [phone]  Send [employer] Message   If you need a medication refill please contact your pharmacy. Please allow 3 business days for your refills to be completed.

## 2023-07-24 NOTE — PROGRESS NOTES
Outcome for 07/24/23 10:59 AM: [employer] message sent  Kym Gomez MA  Outcome for 07/27/23 2:01 PM: Per patient, will send BG readings via [employer]- pt states she will send on 7/30/23  Kym Gomez MA  Outcome for 07/31/23 6:29 AM: Glucose readings sent via [employer]  Kym Gomez MA  Outcome for 07/31/23 6:30 AM: Data obtained via Dexcom website  Kym Gomez MA

## 2023-07-24 NOTE — PROGRESS NOTES
Diabetes Self-Management Education & Support    Presents for: Follow-up    ASSESSMENT:  Becky attended GDM class last week, although was called p/t class to explain that she has pre-existing DM, needs endocrinology support and more intenstive follow up. A meter was sent to pharmacy but she only just picked it up and hasn't yet started testing her blood sugars. She is concerned about adjusting her diet, after class. We reviewed this in detail today as well as blood sugar testing schedule and use of continuous glucose monitor. She is agreeable to using this, feels more confident in diet adjustments. We did review risks for her and baby with pre-existing DM during pregnancy.      Opportunities for ongoing education and support in diabetes-self management were discussed.    Pt verbalized understanding of concepts discussed and recommendations provided today.       INTERVENTION:  Patient presented for Type 2 Diabetes. Advised to check glucose at least 6 times a day, before each meal and 1 hour after the start of each meal., Advised to check urine ketones daily in the morning until negative for 7 days in a row, then reduce to checking once every 7 days., and Reviewed carbohydrate counting, label reading, and meal plan of 30-45g grams carb at breakfast, 45-60g grams of carb at lunch, 45-60g grams of carb at dinner, and 45-60g grams of carb at snacks. Encouraged balanced meals, including protein and fat with carb at all meals and snacks.    We watched [employer]'s video on insulin dosing using a pen and discussed in detail likelihood that she will need some insulin during this pregnancy.     Referral to care team: Endocrinologist - referral placed last week and she has appointment on 7/31 with Dr. Pringle      Education Materials Provided:  GDM packet & 2 GDM logbooks      PLAN:  Check glucose 4 times daily, before breakfast and 1 hour after each meal.      Check Ketones daily for one week, if negative, reduce testing to  once a week.      Follow consistent CHO meal plan, eat CHO and protein/fat at all meals/snacks.    Start Dexcom G7 once available and continue to track blood sugars fasting & 1 or 2-hr post prandial     See Care Plan for co-developed, patient-stated behavior change goals.    Follow up with Endocrinology in 1 week, [facility] in 2 weeks     AVS printed and provided to patient today.    SUBJECTIVE/OBJECTIVE:  Presents for: Follow-up  Accompanied by: Self  Diabetes education in the past 24mo: Yes  Focus of Visit: Patient Unsure  Diabetes type: Type 2  Date of diagnosis: 2018  Disease course: Other  How confident are you filling out medical forms by yourself:: Not Assessed  Diabetes management related comments/concerns: Not really sure how to adjust my diet  Transportation concerns: No  Difficulty affording diabetes medication?: No  Difficulty affording diabetes testing supplies?: No  Other concerns:: English as a second language  Cultural Influences/Ethnic Background:  Not  or       Diabetes Symptoms & Complications     Complications assessed today?: No    Patient Problem List and Family Medical History reviewed for relevant medical history, current medical status, and diabetes risk factors.    Vitals:  LMP 03/29/2023 (Exact Date)     Pre pregnancy weight: not assessed #      Estimated Date of Delivery: Tyshawn 3, 2024    Labs:  Lab Results   Component Value Date    A1C 6.6 02/11/2019     Lab Results   Component Value Date     11/30/2018     Lab Results   Component Value Date    LDL 74 02/11/2019     HDL Cholesterol   Date Value Ref Range Status   02/11/2019 40 (L) >49 mg/dL Final   ]  GFR Estimate   Date Value Ref Range Status   11/30/2018 >90 >60 mL/min/1.7m2 Final     Comment:     Non  GFR Calc     GFR Estimate If Black   Date Value Ref Range Status   11/30/2018 >90 >60 mL/min/1.7m2 Final     Comment:      GFR Calc     Lab Results   Component Value Date    CR 0.72  11/30/2018     No results found for: MICROALBUMIN    Last 3 BP:   BP Readings from Last 3 Encounters:   07/06/23 108/73   06/01/23 109/76   12/21/21 122/84       History   Smoking Status    Never   Smokeless Tobacco    Never       Healthy Eating  Healthy Eating Assessed Today: Yes  Cultural/Restoration diet restrictions?: No  Meal planning/habits: None  Meals include: Breakfast, Morning Snack, Lunch, Dinner, Evening Snack  Breakfast: rice & meat, water  Lunch: Ramen noodles  Dinner: rice & meat  Snacks: AM - peach or apple OR banana; HS - alfreda OR banana  Beverages: Water, Milk  Has patient met with a dietitian in the past?: Yes      Being Active  Being Active Assessed Today: Yes  Exercise:: Yes  How intense was your typical exercise? : Light (like stretching or slow walking) (some walking)  Barrier to exercise: None    Monitoring  Monitoring Assessed Today: Yes  Did patient bring glucose meter to appointment? : No  Blood Glucose Meter: Unknown  Times checking blood sugar at home (number): Never    NOT ASSESSED     Checking for urine ketones? no    Taking Medications      Any changes to above medications since becoming pregnant: NOT ASSESSED     Taking Medication Assessed Today: Yes  Current Treatments: None    Problem Solving  Problem Solving Assessed Today: No  Is the patient at risk for hypoglycemia?: No  Is the patient at risk for DKA?: No  Does patient have severe weather/disaster plan for diabetes management?: No  Does patient have sick day plan for diabetes management?: No              Reducing Risks  Reducing Risks Assessed Today: No    Healthy Coping  Healthy Coping Assessed Today: Yes  Emotional response to diabetes: Ready to learn, Concern for health and well-being  Stage of change: PREPARATION (Decided to change - considering how)    Patient Activation Measure Survey Score:      11/30/2018     3:47 PM   ELISE Score (Last Two)   ELISE Raw Score 32   Activation Score 62.6   ELISE Level 3       Care Plan and  Education Provided:  Insulin administration technique taught today. Patient verbalized understanding.    Hazel Madrigal RD, LD, [facility]     Time Spent: 45 minutes  Encounter Type: Individual    Any diabetes medication dose changes were made via the CDE Protocol per the patient's referring provider. A copy of this encounter was shared with the provider.

## 2023-07-27 ENCOUNTER — TELEPHONE (OUTPATIENT)
Dept: ENDOCRINOLOGY | Facility: CLINIC | Age: 38
End: 2023-07-27

## 2023-07-27 NOTE — TELEPHONE ENCOUNTER
Spoke with patient. Pt states she has not started dexcom yet. Pt will send BG readings via [employer] on 7/30/23 prior to her 7/31/23 appointment. Kym Gomez CMA on 7/27/2023 at 2:03 PM

## 2023-07-31 ENCOUNTER — VIRTUAL VISIT (OUTPATIENT)
Dept: ENDOCRINOLOGY | Facility: CLINIC | Age: 38
End: 2023-07-31
Payer: COMMERCIAL

## 2023-07-31 ENCOUNTER — PRE VISIT (OUTPATIENT)
Dept: MATERNAL FETAL MEDICINE | Facility: CLINIC | Age: 38
End: 2023-07-31

## 2023-07-31 ENCOUNTER — PRE VISIT (OUTPATIENT)
Dept: ENDOCRINOLOGY | Facility: CLINIC | Age: 38
End: 2023-07-31

## 2023-07-31 DIAGNOSIS — E11.65 TYPE 2 DIABETES MELLITUS WITH HYPERGLYCEMIA, WITHOUT LONG-TERM CURRENT USE OF INSULIN (H): Primary | ICD-10-CM

## 2023-07-31 DIAGNOSIS — O24.112 PRE-EXISTING TYPE 2 DIABETES MELLITUS DURING PREGNANCY IN SECOND TRIMESTER: ICD-10-CM

## 2023-07-31 DIAGNOSIS — Z34.82 PRENATAL CARE, SUBSEQUENT PREGNANCY IN SECOND TRIMESTER: ICD-10-CM

## 2023-07-31 PROCEDURE — 99205 OFFICE O/P NEW HI 60 MIN: CPT | Mod: VID | Performed by: INTERNAL MEDICINE

## 2023-07-31 RX ORDER — BLOOD PRESSURE TEST KIT
KIT MISCELLANEOUS
Qty: 100 EACH | Refills: 11 | Status: SHIPPED | OUTPATIENT
Start: 2023-07-31 | End: 2024-08-21

## 2023-07-31 RX ORDER — CONTAINER,EMPTY
1 EACH MISCELLANEOUS ONCE
Qty: 1 EACH | Refills: 0 | Status: SHIPPED | OUTPATIENT
Start: 2023-07-31 | End: 2023-07-31

## 2023-07-31 NOTE — Clinical Note
Met with her-- started levemir. Meets with you Monday, I noted she likely will need prandial insulin coming up but she is new to insulin and very nervous, so we'll start with one injection and go from there

## 2023-07-31 NOTE — PROGRESS NOTES
"  Video-Visit Details    Type of service:  Video Visit  Video Start Time: 11:02  Video End Time:11:45  Originating Location (pt. Location): Home, [place]  Distant Location (provider location):  Home  Platform used for Video Visit: Daily Pringle MD    Becky Hernandez is a 38 year old year old female here for evaluation of diabetes mellitus in pregnancy via a billable video visit.    Currently: 17w5d  Estimated Date of Delivery: Tyshawn 3, 2024  Baby:       1) Diabetes Mellitus in pregnancy    Diabetes History:  Diagnosis: Pre-diabetes prior to pregnancy,   5 previous pregnancies--   Following 5th pregnancy (2017), noted \"body was different\" -- felt hungry and very tired, requested testing and diagnosed with DM 18-19 weeks post partm  5th pregnancy- positive 1hr screen, neg 3 hr  Started on metformin postpartum, took for a while (approx 1 year) but felt GI upset/diarrhea/fatigue--    Overall this pregnancy is going well, day to day feels well, eating ok. Feels some nausea in AM but not terrible    Hospitalizations:   Previous Regimens: metformin   Current Regimen: none, lifestyle only    INTERVAL HISTORY:    Diet:  24hr Recall: 3 meals per day  Woke up around 7a  Breakfast- brown rice with chicken stirfry with vegetable,  Snack- cucumber  Nap  Lunch- Tish noodles at bday party, drank water  Snack- cup corn  Dinner- none  Snacks-  Beverages- water,     Exercise: works Monday-Friday, walks for exercise-  for work  Weekends at home- go to [place]       BG check- Dexcom G7  Trends-   Writes down trends, write down 1 hr, star indicates 2hr  (see note from additional note this encounter)      BP Readings from Last 3 Encounters:   07/06/23 108/73   06/01/23 109/76   12/21/21 122/84       Lab Results   Component Value Date    A1C 6.6 02/11/2019    A1C 7.6 11/30/2018       Wt Readings from Last 3 Encounters:   07/06/23 74.3 kg (163 lb 12.8 oz)   06/01/23 72.6 kg (160 lb)   12/21/21 73.1 kg (161 lb 3.2 oz) "       Current Outpatient Medications   Medication Sig Dispense Refill     acetone urine (KETOSTIX) test strip Use 1 strip/day with first morning urine until ketones are negative for 7 days in a row, then use 1 strip/week. 50 strip 1     aspirin 81 MG EC tablet Take 1 tablet (81 mg) by mouth daily 90 tablet 2     blood glucose (NO BRAND SPECIFIED) test strip Use to test blood sugar 4 times daily or as directed. To accompany: Blood Glucose Monitor Brands: per insurance. 100 strip 6     blood glucose monitoring (NO BRAND SPECIFIED) meter device kit Use to test blood sugar 4 times daily or as directed. Preferred blood glucose meter OR supplies to accompany: Blood Glucose Monitor Brands: per insurance. 1 kit 0     Continuous Blood Gluc Sensor (DEXCOM G7 SENSOR) MISC 1 each every 10 days Change sensor every 10 days 3 each 5     Prenatal Vit-Fe Fumarate-FA (PRENATAL MULTIVITAMIN W/IRON) 27-0.8 MG tablet Take 1 tablet by mouth daily 90 tablet 3     thin (NO BRAND SPECIFIED) lancets Use with lanceting device. To accompany: Blood Glucose Monitor Brands: per insurance. 100 each 6     acetaminophen (TYLENOL) 500 MG tablet Take 500-1,000 mg by mouth every 6 hours as needed for mild pain (Patient not taking: Reported on 6/1/2023)       benzonatate (TESSALON) 200 MG capsule Take 1 capsule (200 mg) by mouth 3 times daily as needed for cough (Patient not taking: Reported on 6/1/2023) 30 capsule 0          REVIEW OF SYSTEMS:   ROS: 10 point ROS neg other than the symptoms noted above in the HPI.      EXAM:  Vitals: [facility] 03/29/2023 (Exact Date)     BMIE= There is no height or weight on file to calculate BMI.  Exam:  Constitutional: healthy, alert, no acute distress  Head: Normocephalic. No masses, lesions, no exophthalmos/proptosis  ENT: normal thyroid, no palpable nodules, no cervical lymph nodes  Respiratory: nonlabored  Gastrointestinal: Abdomen soft, non-tender.  Musculoskeletal: extremities normal- no gross deformities noted, gait  normal and normal muscle tone  Skin: no suspicious lesions or rashes  Neurologic: Gait normal. sensation grossly intact  Psychiatric: mentation appears normal, calm    ASSESSMENT/PLAN:  No diagnosis found.    1) Diabetes Mellitus in pregnancy  - Glucose Control- NOT at goal, continue current regimen.    - long discussion today about pathophysiology of diabetes in pregnancy, counterregulatory hormones, insulin demand/mismatch supply. We discussed we use lifestyle changes (diet/exercise) as a modifiable factor, but sometimes these are not sufficient to control blood sugars and should not be seen as a personal failure. She was very hard on herself that she has been unable to meet target glucoses with lifestyle changes. I also noted, that as pregnancy progresses, we only see rise in insulin demand and she will not make it another 20 weeks without insulin.    - Given this discussion, she is open and receptive to starting insulin therapy, although understandably nervous about this.    - I have elected to start one dose of insulin per day-- basal insulin with levemir (will start with this instead of NPH as anticipate need of MDI in the next few weeks)   - Start 0.2u/kg--> 15u nightly, RX sent   - Reviewed rule of 15 for treatment of hypoglycemia   - Will meet with CDE next week, and noted may need to start mealtime insulin then as well   - She will notify me with hypoglycemia, etc over the next week    - Reviewed blood sugar goals in pregnancy. Glucose goals during pregnancy according to the American Diabetes Association:  Fasting glucose 70-95 mg/dL AND  One-hour postprandial glucose 110-140 mg/dL or  Two-hour postprandial glucose 100-120 mg/dL  - Encouraged at least 30min of activity daily.  - Baseline labs for initial pregnancy visit: Basic Metabolic Panel, BUN/Cr, TSH, LFT s, CBC, UA, Spot Urine for Creatinine, A1C  - We reviewed risks of uncontrolled hyperglycemia during pregnancy, including but not limited to:  gestational hypertension/preeclampsia, increased cesarian section rate, macrosomia, shoulder dystocia, still birth, and long term risk of development of diabetes in grown child    RTC- every 4 weeks until delivery. Follow weekly with diabetes educator.      A total of 60 minutes were spent today 07/31/23 on this visit including chart review, history and counseling, documentation and other activities as detailed above.         Answers for HPI/ROS submitted by the patient on 7/31/2023  General Symptoms: No  Skin Symptoms: No  HENT Symptoms: No  EYE SYMPTOMS: No  HEART SYMPTOMS: No  LUNG SYMPTOMS: No  INTESTINAL SYMPTOMS: No  URINARY SYMPTOMS: No  GYNECOLOGIC SYMPTOMS: No  BREAST SYMPTOMS: No  SKELETAL SYMPTOMS: No  BLOOD SYMPTOMS: No  NERVOUS SYSTEM SYMPTOMS: No  MENTAL HEALTH SYMPTOMS: No

## 2023-07-31 NOTE — NURSING NOTE
Is the patient currently in the state of [place]? YES    Visit mode:VIDEO    If the visit is dropped, the patient can be reconnected by: VIDEO VISIT: Text to cell phone: [phone]    Will anyone else be joining the visit? NO      How would you like to obtain your AVS? MyChart    Are changes needed to the allergy or medication list? NO    Reason for visit: RECHECK and Video Visit

## 2023-07-31 NOTE — LETTER
"7/31/2023       RE: Becky Hernandez  [address] [facility] [place] [address]     Dear Colleague,    Thank you for referring your patient, Becky Hernandez, to the [facility] [place] at [facility]. Please see a copy of my visit note below.    Outcome for 07/24/23 10:59 AM: [employer] message sent  Kym Gomez MA  Outcome for 07/27/23 2:01 PM: Per patient, will send BG readings via [employer]- pt states she will send on 7/30/23  Kym Gomez MA  Outcome for 07/31/23 6:29 AM: Glucose readings sent via [employer]  Kym Gomez MA  Outcome for 07/31/23 6:30 AM: Data obtained via Dexcom website  Kym Gomez MA          Video-Visit Details    Type of service:  Video Visit  Video Start Time: 11:02  Video End Time:11:45  Originating Location (pt. Location): [place], [place]  Distant Location (provider location):  Home  Platform used for Video Visit: Daily Pringle MD    Becky Hernandez is a 38 year old year old female here for evaluation of diabetes mellitus in pregnancy via a billable video visit.    Currently: 17w5d  Estimated Date of Delivery: Tyshawn 3, 2024  Baby:       1) Diabetes Mellitus in pregnancy    Diabetes History:  Diagnosis: Pre-diabetes prior to pregnancy,   5 previous pregnancies--   Following 5th pregnancy (2017), noted \"body was different\" -- felt hungry and very tired, requested testing and diagnosed with DM 18-19 weeks post partm  5th pregnancy- positive 1hr screen, neg 3 hr  Started on metformin postpartum, took for a while (approx 1 year) but felt GI upset/diarrhea/fatigue--    Overall this pregnancy is going well, day to day feels well, eating ok. Feels some nausea in AM but not terrible    Hospitalizations:   Previous Regimens: metformin   Current Regimen: none, lifestyle only    INTERVAL HISTORY:    Diet:  24hr Recall: 3 meals per day  Woke up around 7a  Breakfast- brown rice with chicken stirfry with " vegetable,  Snack- cucumber  Nap  Lunch- Tish noodles at bday party, drank water  Snack- cup corn  Dinner- none  Snacks-  Beverages- water,     Exercise: works Monday-Friday, walks for exercise-  for work  Weekends at home- go to [place]       BG check- Dexcom G7  Trends-   Writes down trends, write down 1 hr, star indicates 2hr  (see note from additional note this encounter)      BP Readings from Last 3 Encounters:   07/06/23 108/73   06/01/23 109/76   12/21/21 122/84       Lab Results   Component Value Date    A1C 6.6 02/11/2019    A1C 7.6 11/30/2018       Wt Readings from Last 3 Encounters:   07/06/23 74.3 kg (163 lb 12.8 oz)   06/01/23 72.6 kg (160 lb)   12/21/21 73.1 kg (161 lb 3.2 oz)       Current Outpatient Medications   Medication Sig Dispense Refill    acetone urine (KETOSTIX) test strip Use 1 strip/day with first morning urine until ketones are negative for 7 days in a row, then use 1 strip/week. 50 strip 1    aspirin 81 MG EC tablet Take 1 tablet (81 mg) by mouth daily 90 tablet 2    blood glucose (NO BRAND SPECIFIED) test strip Use to test blood sugar 4 times daily or as directed. To accompany: Blood Glucose Monitor Brands: per insurance. 100 strip 6    blood glucose monitoring (NO BRAND SPECIFIED) meter device kit Use to test blood sugar 4 times daily or as directed. Preferred blood glucose meter OR supplies to accompany: Blood Glucose Monitor Brands: per insurance. 1 kit 0    Continuous Blood Gluc Sensor (DEXCOM G7 SENSOR) MISC 1 each every 10 days Change sensor every 10 days 3 each 5    Prenatal Vit-Fe Fumarate-FA (PRENATAL MULTIVITAMIN W/IRON) 27-0.8 MG tablet Take 1 tablet by mouth daily 90 tablet 3    thin (NO BRAND SPECIFIED) lancets Use with lanceting device. To accompany: Blood Glucose Monitor Brands: per insurance. 100 each 6    acetaminophen (TYLENOL) 500 MG tablet Take 500-1,000 mg by mouth every 6 hours as needed for mild pain (Patient not taking: Reported on 6/1/2023)       benzonatate (TESSALON) 200 MG capsule Take 1 capsule (200 mg) by mouth 3 times daily as needed for cough (Patient not taking: Reported on 6/1/2023) 30 capsule 0          REVIEW OF SYSTEMS:   ROS: 10 point ROS neg other than the symptoms noted above in the HPI.      EXAM:  Vitals: LMP 03/29/2023 (Exact Date)     BMIE= There is no height or weight on file to calculate BMI.  Exam:  Constitutional: healthy, alert, no acute distress  Head: Normocephalic. No masses, lesions, no exophthalmos/proptosis  ENT: normal thyroid, no palpable nodules, no cervical lymph nodes  Respiratory: nonlabored  Gastrointestinal: Abdomen soft, non-tender.  Musculoskeletal: extremities normal- no gross deformities noted, gait normal and normal muscle tone  Skin: no suspicious lesions or rashes  Neurologic: Gait normal. sensation grossly intact  Psychiatric: mentation appears normal, calm    ASSESSMENT/PLAN:  No diagnosis found.    1) Diabetes Mellitus in pregnancy  - Glucose Control- NOT at goal, continue current regimen.    - long discussion today about pathophysiology of diabetes in pregnancy, counterregulatory hormones, insulin demand/mismatch supply. We discussed we use lifestyle changes (diet/exercise) as a modifiable factor, but sometimes these are not sufficient to control blood sugars and should not be seen as a personal failure. She was very hard on herself that she has been unable to meet target glucoses with lifestyle changes. I also noted, that as pregnancy progresses, we only see rise in insulin demand and she will not make it another 20 weeks without insulin.    - Given this discussion, she is open and receptive to starting insulin therapy, although understandably nervous about this.    - I have elected to start one dose of insulin per day-- basal insulin with levemir (will start with this instead of NPH as anticipate need of MDI in the next few weeks)   - Start 0.2u/kg--> 15u nightly, RX sent   - Reviewed rule of 15 for  treatment of hypoglycemia   - Will meet with CDE next week, and noted may need to start mealtime insulin then as well   - She will notify me with hypoglycemia, etc over the next week    - Reviewed blood sugar goals in pregnancy. Glucose goals during pregnancy according to the American Diabetes Association:  Fasting glucose 70-95 mg/dL AND  One-hour postprandial glucose 110-140 mg/dL or  Two-hour postprandial glucose 100-120 mg/dL  - Encouraged at least 30min of activity daily.  - Baseline labs for initial pregnancy visit: Basic Metabolic Panel, BUN/Cr, TSH, LFT s, CBC, UA, Spot Urine for Creatinine, A1C  - We reviewed risks of uncontrolled hyperglycemia during pregnancy, including but not limited to: gestational hypertension/preeclampsia, increased cesarian section rate, macrosomia, shoulder dystocia, still birth, and long term risk of development of diabetes in grown child    RTC- every 4 weeks until delivery. Follow weekly with diabetes educator.      A total of 60 minutes were spent today 07/31/23 on this visit including chart review, history and counseling, documentation and other activities as detailed above.         Answers for HPI/ROS submitted by the patient on 7/31/2023  General Symptoms: No  Skin Symptoms: No  HENT Symptoms: No  EYE SYMPTOMS: No  HEART SYMPTOMS: No  LUNG SYMPTOMS: No  INTESTINAL SYMPTOMS: No  URINARY SYMPTOMS: No  GYNECOLOGIC SYMPTOMS: No  BREAST SYMPTOMS: No  SKELETAL SYMPTOMS: No  BLOOD SYMPTOMS: No  NERVOUS SYSTEM SYMPTOMS: No  MENTAL HEALTH SYMPTOMS: No

## 2023-08-03 ENCOUNTER — PRENATAL OFFICE VISIT (OUTPATIENT)
Dept: OBGYN | Facility: CLINIC | Age: 38
End: 2023-08-03
Attending: OBSTETRICS & GYNECOLOGY
Payer: COMMERCIAL

## 2023-08-03 VITALS
HEART RATE: 89 BPM | OXYGEN SATURATION: 94 % | WEIGHT: 165.4 LBS | BODY MASS INDEX: 34.57 KG/M2 | SYSTOLIC BLOOD PRESSURE: 99 MMHG | DIASTOLIC BLOOD PRESSURE: 67 MMHG

## 2023-08-03 DIAGNOSIS — O09.522 MULTIGRAVIDA OF ADVANCED MATERNAL AGE IN SECOND TRIMESTER: ICD-10-CM

## 2023-08-03 DIAGNOSIS — Z34.82 PRENATAL CARE, SUBSEQUENT PREGNANCY IN SECOND TRIMESTER: Primary | ICD-10-CM

## 2023-08-03 DIAGNOSIS — O24.112 PREGNANCY WITH TYPE 2 DIABETES MELLITUS IN SECOND TRIMESTER: ICD-10-CM

## 2023-08-03 PROCEDURE — 99207 PR PRENATAL VISIT: CPT | Performed by: OBSTETRICS & GYNECOLOGY

## 2023-08-03 RX ORDER — BLOOD-GLUCOSE METER
EACH MISCELLANEOUS
COMMUNITY
Start: 2023-07-17 | End: 2024-08-21

## 2023-08-03 NOTE — PROGRESS NOTES
18w1d.  Her glucoses have range from about 100 to up to 300.  She changed her rice to brown rice, but the glucoses have still gone up about the same.  We discussed the diet and carbs.  She will attempt to adjust the carbs better.  She will contact the diabetic team.    She has MFM visit for diabetes and AMA in pregnancy.  Ulisses Edwards MD

## 2023-08-07 ENCOUNTER — OFFICE VISIT (OUTPATIENT)
Dept: MATERNAL FETAL MEDICINE | Facility: CLINIC | Age: 38
End: 2023-08-07
Attending: OBSTETRICS & GYNECOLOGY
Payer: COMMERCIAL

## 2023-08-07 ENCOUNTER — HOSPITAL ENCOUNTER (OUTPATIENT)
Dept: ULTRASOUND IMAGING | Facility: CLINIC | Age: 38
Discharge: HOME | End: 2023-08-07
Attending: OBSTETRICS & GYNECOLOGY
Payer: COMMERCIAL

## 2023-08-07 ENCOUNTER — MYC MEDICAL ADVICE (OUTPATIENT)
Dept: EDUCATION SERVICES | Facility: CLINIC | Age: 38
End: 2023-08-07

## 2023-08-07 ENCOUNTER — VIRTUAL VISIT (OUTPATIENT)
Dept: EDUCATION SERVICES | Facility: CLINIC | Age: 38
End: 2023-08-07
Payer: COMMERCIAL

## 2023-08-07 DIAGNOSIS — O26.90 PREGNANCY RELATED CONDITION, ANTEPARTUM: ICD-10-CM

## 2023-08-07 DIAGNOSIS — O24.112 PRE-EXISTING TYPE 2 DIABETES MELLITUS DURING PREGNANCY IN SECOND TRIMESTER: ICD-10-CM

## 2023-08-07 DIAGNOSIS — E11.65 TYPE 2 DIABETES MELLITUS WITH HYPERGLYCEMIA, WITHOUT LONG-TERM CURRENT USE OF INSULIN (H): ICD-10-CM

## 2023-08-07 DIAGNOSIS — O24.112 PRE-EXISTING TYPE 2 DIABETES MELLITUS DURING PREGNANCY IN SECOND TRIMESTER: Primary | ICD-10-CM

## 2023-08-07 DIAGNOSIS — O09.522 MULTIGRAVIDA OF ADVANCED MATERNAL AGE IN SECOND TRIMESTER: Primary | ICD-10-CM

## 2023-08-07 PROCEDURE — 76811 OB US DETAILED SNGL FETUS: CPT | Mod: 26 | Performed by: OBSTETRICS & GYNECOLOGY

## 2023-08-07 PROCEDURE — 98968 PH1 ASSMT&MGMT NQHP 21-30: CPT | Mod: 93 | Performed by: DIETITIAN, REGISTERED

## 2023-08-07 PROCEDURE — 96040 HC GENETIC COUNSELING, EACH 30 MINUTES: CPT | Performed by: GENETIC COUNSELOR, MS

## 2023-08-07 PROCEDURE — 99203 OFFICE O/P NEW LOW 30 MIN: CPT | Mod: 25 | Performed by: OBSTETRICS & GYNECOLOGY

## 2023-08-07 PROCEDURE — 76811 OB US DETAILED SNGL FETUS: CPT

## 2023-08-07 NOTE — PROGRESS NOTES
"Diabetes and Pregnancy Follow-up  Type of Service: Telephone Visit    How would patient like to obtain AVS? Not needed    Subjective/Objective:    Becky Hernandez was called for a scheduled BG review. Last date of communication was: 7/24/23, Endocrinology visit on 7/31.    Diabetes is being managed with diet, activity, and medications    Taking diabetes medications: yes:     Diabetes Medication(s)       Insulin       insulin detemir (LEVEMIR PEN) 100 UNIT/ML pen    Inject 15 Units Subcutaneous At Bedtime            Estimated Date of Delivery: Tyshawn 3, 2024    Blood Glucose/Ketone Log:        Date Ketones Fasting Post Breakfast Post Lunch Post Supper   8/5  107 240 190 133   8/6  94 187 189 112   8/7  116 250       Staying away from white rice, this AM had apple pie, iris, & GRICELDA     Assessment: since starting insulin    Ketones: NA.   Fasting blood glucoses: 33% in target.  After breakfast: 0% in target.  Before lunch: -% in target.  After lunch: 0% in target.  Before dinner: -% in target.  After dinner: 100% in target.    Becky is worried about her food choices, feeling concerned about the high numbers. Her dinner is usually smaller because she has the opportunity to snack in between but she doesn't have this opportunity at work so has a bigger breakfast and lunch. We discussed how diet is always helpful to keep numbers healthy but that during pregnancy, even a \"perfect\" diet might still results in high numbers 2/2 strong pregnancy hormones. Encouraged use of medicine to help with post-prandial hyperglycemia and she is agreeable to starting mealtime insulin to help bring the numbers down.    We reviewed follow up plan in detail. She will send messages via ebridge weekly with blood sugar log, monthly follow up with Endocrinology and we set up a phone visit check in 6 weeks from now too, at patient request.     Provided Dexcom share code via ebridge to attempt to gain access to Dexcom data today. "     Plan/Response:  Recommend that patient begin Novolog insulin - 5 units with breakfast & lunch (0.2 unit(s)/kg/meals TID), will hold off on dinner dose today since post-prandial blood sugars are currently in range   Recommend increase to insulin - levemir 0-0-0-15 --> 0-0-0-17.  Follow-up in 3-4 days, [facility] and every Thursday moving forward    Hazel Madrigal RD, LD, [facility]   Time Spent: 25 minutes    Any diabetes medication dose changes were made via the CDE Protocol and Collaborative Practice Agreement with the patient's endocrinology provider. A copy of this encounter was shared with the provider.

## 2023-08-07 NOTE — PROGRESS NOTES
Please see full imaging report from ViewPoint program under imaging tab.    Thank-you for referring your patient for a comprehensive ultrasound due to pre-gestational diabetes.    I discussed the findings on today's ultrasound with the patient. I reviewed the limitations of ultrasound both in detecting aneuploidy and structural abnormalities.  Ultrasound can routinely detect 80-90% of structural abnormalities.  She has not had genetic screening this pregnancy, genetic screening/testing options were reviewed which she is not interested in today.    Fetal echocardiogram will be scheduled with pediatric cardiology. The patient would prefer that all additional follow up be done in [place] if at all possible. Surveillance should include serial growth every 4 weeks starting at 26 weeks and twice weekly BPP at 32 weeks. She will have her next growth/follow up  comp done in [place] at the time of her echo but will need to talk with her OB team after that to see where her growth US will be done.     Return to primary provider for continued prenatal care.    If you have questions regarding today's evaluation or if we can be of further service, please contact the [facility].    **Fetal anomalies may be present but not detected**    I spent a total of 20 minutes on the date of this encounter including preparing to see the patient (reviewing medical records/tests), counseling and discussing the plan of care, documenting the visit in the electronic medical record, and communicating with other health care professionals and/or care coordination.    Nirali Monterroso MD  Maternal Fetal Medicine     yes

## 2023-08-07 NOTE — TELEPHONE ENCOUNTER
Blood sugar log reviewed in today's Diabetes Ed telephone encounter. See note for more detail.     Hazel Madrigal RD, LD, [facility]

## 2023-08-07 NOTE — PROGRESS NOTES
"[facility]  Genetic Counseling Consult    Patient:  Becky Hernandez YOB: 1985   Date of Service:  23   MRN: [id number]    Becky was seen at the [facility] [place] for genetic counseling consultation due to advanced maternal age to discuss the options for testing for fetal chromosome abnormalities. The patient was unaccompanied to today's visit.     IMPRESSION/ PLAN   1. Becky has not had screening in this pregnancy. We reviewed availability of screening and diagnostic testing options. She had declined these at this time, however is aware these remain available.     2. Becky had a comprehensive (level II) ultrasound today.  Please see the ultrasound report for further details.    3. We reviewed plan for fetal echocardiogram for maternal preexisting diabetes.     PREGNANCY HISTORY   /Parity:    Age at Delivery: 38 year old  Gestational Age: 18w5d   MIGUE: 1/3/2024, by Last Menstrual Period             No significant complications or exposures were reported in the current pregnancy. She reports taking a baby ASA.   Becky's pregnancy history is significant for:   Two SAB's and five term deliveries    MEDICAL HISTORY   Becky has a history of type 2 diabetes and is taking insulin. We discussed that maternal diabetes increases the chance for birth defects especially when poorly controlled in the first trimester. These birth defects can include spinal cord defects (spina bifida), heart defects, skeletal defects, and defects in the urinary, reproductive, and digestive systems. Diabetes in the pregnancy can also lead to complications such as pre-eclampsia, polyhydramnios, and  delivery. We reviewed plan for fetal echocardiogram for maternal preexisting diabetes.        FAMILY HISTORY   A three-generation pedigree was obtained today and is scanned under the \"Media\" tab in Epic.     The following significant " findings were reported today:   Ni's paternal uncle was reported to have colon cancer in his 40's and this uncle's son was reported to have colon cancer in his 50's. We discussed how most cancer seen in families occurs sporadically, but about 5-10% may be due to an underlying genetic etiology. We reviewed that Ni should be encouraged to share this family history information with his primary care providers to ensure appropriate screening.     Otherwise, the reported family history is unremarkable for multiple miscarriages, stillbirths, birth defects, intellectual disabilities, known genetic conditions, and consanguinity.       CARRIER SCREENING   Expanded carrier screening is available to screen for autosomal recessive conditions and X-linked conditions in a large list of genes. Autosomal recessive conditions happen when a mutation has been inherited from the egg and sperm and include conditions like cystic fibrosis, thalassemia, hearing loss, spinal muscular atrophy, and more. X-linked conditions happen when a mutation has been inherited from the egg and include conditions like fragile X syndrome. Blodgett screening was also reviewed.    The patient did not elect to pursue the carrier screening options discussed today, however is aware these remain available.        RISK ASSESSMENT FOR CHROMOSOME CONDITIONS   We explained that the risk for fetal chromosome abnormalities increases with maternal age. We discussed specific features of common chromosome abnormalities, including Down syndrome, trisomy 13, trisomy 18, and sex chromosome trisomies.    At age 38 at midtrimester, the risk to have a baby with Down syndrome is 1 in 129.   At age 38 at midtrimester, the risk to have a baby with any chromosome abnormality is 1 in 65.     GENETIC TESTING OPTIONS   Genetic testing during a pregnancy includes screening and diagnostic procedures.      We discussed the following screening options:   Non-invasive prenatal testing  (NIPT)  Also called cell-free DNA screening because it detects chromosomes from the placenta in the pregnant person's blood  Can be done any time after 10 weeks gestation  Screens for trisomy 21, trisomy 18, trisomy 13, and sex chromosome aneuploidies  Cannot screen for open neural tube defects, maternal serum AFP after 15 weeks is recommended    We discussed the following ultrasound options:  Comprehensive level II ultrasound (Fetal Anatomy Ultrasound)  Ultrasound done between 18-20 weeks gestation  Screens for major birth defects and markers for aneuploidy (like trisomy 21 and trisomy 18)  Includes looking at the fetus/baby's growth, heart, organs (stomach, kidneys), placenta, and amniotic fluid    Fetal Echocardiogram  Ultrasound done between 22-24 weeks gestation  Screen for heart defects  Recommended if there are concerns about the heart or other indications like IVF pregnancy or maternal diabetes    We discussed the following diagnostic options:   Amniocentesis  Invasive diagnostic procedure done after 15 weeks gestation  The procedure collects a small sample of amniotic fluid for the purpose of chromosomal testing and/or other genetic testing  Diagnostic result; more than 99% sensitivity for fetal chromosome abnormalities  Testing for AFP in the amniotic fluid can test for open neural tube defects    The benefits and limitations of noninvasive screening were reviewed. Screening tests provide a risk assessment (chance) specific to the pregnancy for certain fetal chromosome abnormalities but cannot definitively diagnose or exclude a fetal chromosome abnormality. Follow-up genetic counseling and consideration of diagnostic testing is recommended with any abnormal screening result. Diagnostic testing during a pregnancy is more certain and can test for more conditions. However, the tests do have a risk of miscarriage that requires careful consideration. These tests can detect fetal chromosome abnormalities with  greater than 99% certainty. Results can be compromised by maternal cell contamination or mosaicism and are limited by the resolution of current genetic testing technology. There is no screening or diagnostic test that detects all forms of birth defects or intellectual disability.     It was a pleasure to be involved with Becky s care. Face-to-face time of the meeting was 18 minutes.    Maria Del Carmen Zuniga MS, [facility]  Licensed Genetic Counselor  [facility]  Maternal Fetal Medicine  Ph: [phone]  [email or web address]

## 2023-08-07 NOTE — NURSING NOTE
Patient presents to M for L2. Positive fetal movement. Denies LOF, vaginal bleeding or cramping/contractions. SBAR given to MFJONI MD, see their note in Epic.

## 2023-08-07 NOTE — LETTER
"    8/7/2023         RE: Becky Hernandez  [address]  [place] [address]        Dear Colleague,    Thank you for referring your patient, Becky Hernandez, to the [facility]. Please see a copy of my visit note below.    Diabetes and Pregnancy Follow-up  Type of Service: Telephone Visit    How would patient like to obtain AVS? Not needed    Subjective/Objective:    Becky Hernandez was called for a scheduled BG review. Last date of communication was: 7/24/23, Endocrinology visit on 7/31.    Diabetes is being managed with diet, activity, and medications    Taking diabetes medications: yes:     Diabetes Medication(s)       Insulin       insulin detemir (LEVEMIR PEN) 100 UNIT/ML pen    Inject 15 Units Subcutaneous At Bedtime            Estimated Date of Delivery: Tyshawn 3, 2024    Blood Glucose/Ketone Log:        Date Ketones Fasting Post Breakfast Post Lunch Post Supper   8/5  107 240 190 133   8/6  94 187 189 112   8/7  116 250       Staying away from white rice, this AM had apple pie, iris, & GRICELDA     Assessment: since starting insulin    Ketones: NA.   Fasting blood glucoses: 33% in target.  After breakfast: 0% in target.  Before lunch: -% in target.  After lunch: 0% in target.  Before dinner: -% in target.  After dinner: 100% in target.    Becky is worried about her food choices, feeling concerned about the high numbers. Her dinner is usually smaller because she has the opportunity to snack in between but she doesn't have this opportunity at work so has a bigger breakfast and lunch. We discussed how diet is always helpful to keep numbers healthy but that during pregnancy, even a \"perfect\" diet might still results in high numbers 2/2 strong pregnancy hormones. Encouraged use of medicine to help with post-prandial hyperglycemia and she is agreeable to starting mealtime insulin to help bring the numbers down.    We reviewed follow up plan in detail. She will send messages via [employer] weekly with " blood sugar log, monthly follow up with Endocrinology and we set up a phone visit check in 6 weeks from now too, at patient request.     Provided Dexcom share code via [email or web address] to attempt to gain access to Dexcom data today.     Plan/Response:  Recommend that patient begin Novolog insulin - 5 units with breakfast & lunch (0.2 unit(s)/kg/meals TID), will hold off on dinner dose today since post-prandial blood sugars are currently in range   Recommend increase to insulin - levemir 0-0-0-15 --> 0-0-0-17.  Follow-up in 3-4 days, patient to [email or web address] this Thursday and every Thursday moving forward    Hazel Madrigal, RD, LD, [facility]   Time Spent: 25 minutes    Any diabetes medication dose changes were made via the CDE Protocol and Collaborative Practice Agreement with the patient's endocrinology provider. A copy of this encounter was shared with the provider.

## 2023-08-07 NOTE — TELEPHONE ENCOUNTER
Dr. Pringle,   See today's phone encounter for blood sugar log - patient would benefit from Novolog 5 units with meals, starting with just breakfast & lunch for now (0.2 unit(s)/kg/3 meals). I've pended orders. Please sign off if you agree or provide an alternative plan.   Thank you,   Hazel Madrigal, RD, LD, [facility]

## 2023-08-10 ENCOUNTER — MYC MEDICAL ADVICE (OUTPATIENT)
Dept: EDUCATION SERVICES | Facility: CLINIC | Age: 38
End: 2023-08-10
Payer: COMMERCIAL

## 2023-08-10 DIAGNOSIS — E11.65 TYPE 2 DIABETES MELLITUS WITH HYPERGLYCEMIA, WITHOUT LONG-TERM CURRENT USE OF INSULIN (H): ICD-10-CM

## 2023-08-10 DIAGNOSIS — O24.112 PRE-EXISTING TYPE 2 DIABETES MELLITUS DURING PREGNANCY IN SECOND TRIMESTER: ICD-10-CM

## 2023-08-11 NOTE — TELEPHONE ENCOUNTER
Gestational Diabetes Follow-up    Subjective/Objective:    Becky Hernandez sent in blood glucose log for review. Last date of communication was: 8/8/23.    Gestational diabetes is being managed with diet, activity, and medications    Taking diabetes medications: yes:     Diabetes Medication(s)       Insulin       insulin aspart (NOVOLOG PEN) 100 UNIT/ML pen    Inject 5 Units Subcutaneous daily (with breakfast) AND 5 Units daily (with lunch).     insulin detemir (LEVEMIR PEN) 100 UNIT/ML pen    Inject 17 Units Subcutaneous At Bedtime            Estimated Date of Delivery: Tyshawn 3, 2024, 19w2d    BG/Food Log:         Assessment:  Blood sugars remain elevated.  Check in with patient to determine if they were able to start Novolog, then add in dinner recommendations given post meal highs.     Called [facility] regarding Novolog prescription- unclear what happened pharmacy confirmed novolog is covered and adjusted the dose.    Ketones: none reported.   Fasting blood glucoses: 20% in target.  After breakfast: 25% in target.  Before lunch: -% in target.  After lunch: 25% in target.  Before dinner: -% in target.  After dinner: 0% in target.    Plan/Response:  Recommend that patient begin Novolog insulin, but recommend adding Novolog to dinner with significant post meal highs.  Novolog 5-5-0-0 --> 5-5-5-0    Follow up Tuesday.    Dot Soriano MS, RD, LD, CDE    Any diabetes medication dose changes were made via the CDE Protocol and Collaborative Practice Agreement with the patient's OB/GYN provider. A copy of this encounter was shared with the provider.

## 2023-08-18 ENCOUNTER — MYC MEDICAL ADVICE (OUTPATIENT)
Dept: EDUCATION SERVICES | Facility: CLINIC | Age: 38
End: 2023-08-18
Payer: COMMERCIAL

## 2023-08-18 DIAGNOSIS — E11.65 TYPE 2 DIABETES MELLITUS WITH HYPERGLYCEMIA, WITHOUT LONG-TERM CURRENT USE OF INSULIN (H): ICD-10-CM

## 2023-08-18 DIAGNOSIS — O24.112 PRE-EXISTING TYPE 2 DIABETES MELLITUS DURING PREGNANCY IN SECOND TRIMESTER: ICD-10-CM

## 2023-08-18 NOTE — TELEPHONE ENCOUNTER
Gestational Diabetes Follow-up    Subjective/Objective:    Becky Hernandez sent in blood glucose log for review. Last date of communication was: 8/10/23.    Gestational diabetes is being managed with diet, activity, and medications    Taking diabetes medications: yes:     Diabetes Medication(s)       Insulin       insulin aspart (NOVOLOG PEN) 100 UNIT/ML pen    Inject 5 Units Subcutaneous daily (with breakfast) AND 5 Units daily (with lunch) AND 5 Units daily (before supper).     insulin detemir (LEVEMIR PEN) 100 UNIT/ML pen    Inject 17 Units Subcutaneous At Bedtime            Estimated Date of Delivery: Tyshawn 3, 2024, 20w2d    BG/Food Lo/12-            Assessment:  Blood sugars remain elevated.  Some improvement to pre dinner readings.  Recommend increase to mealtime insulin given significant elevations. With post dinner readings reaching 200 request above protocol increase from Endocrinology.  Of note patient did not follow up Tuesday as requested.    Ketones: none reported.   Fasting blood glucoses: 0% in target.  After breakfast: 0% in target.  Before lunch: 0% in target.  After lunch: 0% in target.  Before dinner: 0% in target.  After dinner: 0% in target.    Plan/Response:  Recommend increase to insulin - Novolog 5-5-5-0 --> 88-10. Orders routed to endo for approval (34% increase).    Follow up Monday.    Dot Soriano MS, RD, LD, CDE      Any diabetes medication dose changes were made via the CDE Protocol and Collaborative Practice Agreement with the patient's endocrinology provider. A copy of this encounter was shared with the provider.

## 2023-08-22 ENCOUNTER — MYC MEDICAL ADVICE (OUTPATIENT)
Dept: EDUCATION SERVICES | Facility: CLINIC | Age: 38
End: 2023-08-22
Payer: COMMERCIAL

## 2023-08-22 DIAGNOSIS — E11.65 TYPE 2 DIABETES MELLITUS WITH HYPERGLYCEMIA, WITHOUT LONG-TERM CURRENT USE OF INSULIN (H): ICD-10-CM

## 2023-08-22 DIAGNOSIS — O24.112 PRE-EXISTING TYPE 2 DIABETES MELLITUS DURING PREGNANCY IN SECOND TRIMESTER: ICD-10-CM

## 2023-08-22 NOTE — TELEPHONE ENCOUNTER
Type 2 Diabetes + Pregnancy Follow-up    Subjective/Objective:    Becky Hernandez sent in blood glucose log for review. Last date of communication was: 8/18/23.    Gestational diabetes is being managed with diet, activity, and medications    Taking diabetes medications: yes:     Diabetes Medication(s)       Insulin       insulin aspart (NOVOLOG PEN) 100 UNIT/ML pen    Inject 8 Units Subcutaneous daily (with breakfast) AND 8 Units daily (with lunch) AND 10 Units daily (before supper).     insulin detemir (LEVEMIR PEN) 100 UNIT/ML pen    Inject 17 Units Subcutaneous At Bedtime            Estimated Date of Delivery: Tyshawn 3, 2024    BG/Food Log:                 Assessment: since 8/18, starting 8/19    Ketones: NA.   Fasting blood glucoses: 25% in target.  After breakfast: 0% in target.  Before lunch: -% in target.  After lunch: 0% in target.  Before dinner: -% in target.  After dinner: 50% in target.      Plan/Response:  Recommend increase to insulin -   Levemir 0-0-0-17 --> 0-0-0-20  Novolog 5-5-5-0 --> 6-6-6-0  See [employer] message for patient communications.     Hazel Madrigal RD, LD, [facility]     Any diabetes medication dose changes were made via the CDE Protocol and Collaborative Practice Agreement with the patient's endocrinology provider. A copy of this encounter was shared with the provider.

## 2023-08-29 ENCOUNTER — MYC MEDICAL ADVICE (OUTPATIENT)
Dept: EDUCATION SERVICES | Facility: CLINIC | Age: 38
End: 2023-08-29
Payer: COMMERCIAL

## 2023-08-29 DIAGNOSIS — E11.65 TYPE 2 DIABETES MELLITUS WITH HYPERGLYCEMIA, WITHOUT LONG-TERM CURRENT USE OF INSULIN (H): ICD-10-CM

## 2023-08-29 DIAGNOSIS — O24.112 PRE-EXISTING TYPE 2 DIABETES MELLITUS DURING PREGNANCY IN SECOND TRIMESTER: ICD-10-CM

## 2023-08-29 NOTE — TELEPHONE ENCOUNTER
Type 2 Diabetes + Pregnancy Follow-up    Subjective/Objective:    Becky Hernandez sent in blood glucose log for review. Last date of communication was: 8/22/23.    Diabetes is being managed with diet, activity, and medications    Taking diabetes medications: yes:     Diabetes Medication(s)       Insulin       insulin aspart (NOVOLOG PEN) 100 UNIT/ML pen    Inject 8 Units Subcutaneous daily (with breakfast) AND 8 Units daily (with lunch) AND 10 Units daily (before supper).     insulin detemir (LEVEMIR PEN) 100 UNIT/ML pen    Inject 20 Units Subcutaneous At Bedtime            Estimated Date of Delivery: Tyshawn 3, 2024    BG/Food Log:                     Assessment:    Ketones: -.   Fasting blood glucoses: 25% in target.  After breakfast: 14% in target.  Before lunch: -% in target.  After lunch: 17% in target.  Before dinner: -% in target.  After dinner: 17% in target.    Plan/Response:  Recommend increase to insulin -   Levemir 0-0-0-20 --> 0-0-0-24  Novolog 6-6-6-0 -->7-7-7-0.  Follow-up in 1 week.  See [employer] message for patient communications.       Hazel Madrigal, RD, LD, [facility]     Any diabetes medication dose changes were made via the CDE Protocol and Collaborative Practice Agreement with the patient's endocrinology provider. A copy of this encounter was shared with the provider.

## 2023-08-30 ENCOUNTER — VIRTUAL VISIT (OUTPATIENT)
Dept: ENDOCRINOLOGY | Facility: CLINIC | Age: 38
End: 2023-08-30
Payer: COMMERCIAL

## 2023-08-30 DIAGNOSIS — E11.65 TYPE 2 DIABETES MELLITUS WITH HYPERGLYCEMIA, WITHOUT LONG-TERM CURRENT USE OF INSULIN (H): Primary | ICD-10-CM

## 2023-08-30 PROCEDURE — 99213 OFFICE O/P EST LOW 20 MIN: CPT | Mod: VID | Performed by: INTERNAL MEDICINE

## 2023-08-30 NOTE — LETTER
"    8/30/2023         RE: Becky Hernandez  [address]  [place] [address]        Dear Colleague,    Thank you for referring your patient, Becky Hernandez, to the [facility] [place]. Please see a copy of my visit note below.      Video-Visit Details    Type of service:  Video Visit  Video Start Time: 2:40  Video End Time: 3:00  Originating Location (pt. Location): [place], [place]  Distant Location (provider location):  Home  Platform used for Video Visit: Daily Pringle MD    Becky Hernandez is a 38 year old year old female here for follow-up of diabetes mellitus in pregnancy via a billable video visit. Last seen by me July 2023. Following with CDE    Currently: 22w0d  Estimated Date of Delivery: Tyshawn 3, 2024  Baby: girl       1) Diabetes Mellitus in pregnancy    Diabetes History:  Diagnosis: Pre-diabetes prior to pregnancy,   5 previous pregnancies--   Following 5th pregnancy (2017), noted \"body was different\" -- felt hungry and very tired, requested testing and diagnosed with DM 18-19 weeks post partm  5th pregnancy- positive 1hr screen, neg 3 hr  Started on metformin postpartum, took for a while (approx 1 year) but felt GI upset/diarrhea/fatigue--    Hospitalizations:   Previous Regimens: metformin   Current Regimen: levemir 24u, Novolog 7 with each meal     INTERVAL HISTORY:  - Feels like overall insulin injections are going well, able to keep up with them  - Does not have backup meter, insurance wouldn't fill them  - has not changed novolog dosing    Diet:  24hr Recall: 3 meals per day  Minimal snacks, but occasionally on weekends will have extra     Exercise: works Monday-Friday, walks for exercise-  for work  Weekends at home- go to [employer]       BG check- Dexcom G7  Trends-                   BP Readings from Last 3 Encounters:   08/03/23 99/67   07/06/23 108/73   06/01/23 109/76       Lab Results   Component Value Date    A1C 6.6 02/11/2019    A1C 7.6 11/30/2018 "       Wt Readings from Last 3 Encounters:   08/03/23 75 kg (165 lb 6.4 oz)   07/06/23 74.3 kg (163 lb 12.8 oz)   06/01/23 72.6 kg (160 lb)       Current Outpatient Medications   Medication Sig Dispense Refill     aspirin 81 MG EC tablet Take 1 tablet (81 mg) by mouth daily 90 tablet 2     insulin aspart (NOVOLOG PEN) 100 UNIT/ML pen Inject 8 Units Subcutaneous daily (with breakfast) AND 8 Units daily (with lunch) AND 10 Units daily (before supper). 15 mL 3     insulin detemir (LEVEMIR PEN) 100 UNIT/ML pen Inject 24 Units Subcutaneous At Bedtime 15 mL 1     Prenatal Vit-Fe Fumarate-FA (PRENATAL MULTIVITAMIN W/IRON) 27-0.8 MG tablet Take 1 tablet by mouth daily 90 tablet 3     acetaminophen (TYLENOL) 500 MG tablet Take 500-1,000 mg by mouth every 6 hours as needed for mild pain (Patient not taking: Reported on 6/1/2023)       acetone urine (KETOSTIX) test strip Use 1 strip/day with first morning urine until ketones are negative for 7 days in a row, then use 1 strip/week. 50 strip 1     Alcohol Swabs PADS Use on skin prior to fingertip glucose testing 3x per day 100 each 11     benzonatate (TESSALON) 200 MG capsule Take 1 capsule (200 mg) by mouth 3 times daily as needed for cough (Patient not taking: Reported on 6/1/2023) 30 capsule 0     blood glucose (NO BRAND SPECIFIED) test strip Use to test blood sugar 4 times daily or as directed. To accompany: Blood Glucose Monitor Brands: per insurance. 100 strip 6     blood glucose monitoring (NO BRAND SPECIFIED) meter device kit Use to test blood sugar 4 times daily or as directed. Preferred blood glucose meter OR supplies to accompany: Blood Glucose Monitor Brands: per insurance. 1 kit 0     Blood Glucose Monitoring Suppl (ACCU-CHEK GUIDE ME) w/Device KIT TEST BLOOD SUGAR FOUR TIMES DAILY       Continuous Blood Gluc Sensor (DEXCOM G7 SENSOR) MISC 1 each every 10 days Change sensor every 10 days 3 each 5     insulin pen needle (32G X 4 MM) 32G X 4 MM miscellaneous Use 4 pen  needles daily or as directed. 150 each 1     thin (NO BRAND SPECIFIED) lancets Use with lanceting device. To accompany: Blood Glucose Monitor Brands: per insurance. 100 each 6          REVIEW OF SYSTEMS:   ROS: 10 point ROS neg other than the symptoms noted above in the HPI.      EXAM:  Vitals: LMP 03/29/2023 (Exact Date)     BMIE= There is no height or weight on file to calculate BMI.  Exam:  Constitutional: healthy, alert, no acute distress  Head: Normocephalic. No masses, lesions, no exophthalmos/proptosis  ENT: normal thyroid, no palpable nodules, no cervical lymph nodes  Respiratory: nonlabored  Gastrointestinal: Abdomen soft, non-tender.  Musculoskeletal: extremities normal- no gross deformities noted, gait normal and normal muscle tone  Skin: no suspicious lesions or rashes  Neurologic: Gait normal. sensation grossly intact  Psychiatric: mentation appears normal, calm    ASSESSMENT/PLAN:  No diagnosis found.    1) Diabetes Mellitus in pregnancy  - Glucose Control- NOT at goal, continue current regimen.    - long discussion today about pathophysiology of diabetes in pregnancy, counterregulatory hormones, insulin demand/mismatch supply. We discussed we use lifestyle changes (diet/exercise) as a modifiable factor, but sometimes these are not sufficient to control blood sugars and should not be seen as a personal failure. She was very hard on herself that she has been unable to meet target glucoses with lifestyle changes. I also noted, that as pregnancy progresses, we only see rise in insulin demand and she will not make it another 20 weeks without insulin.    - Levemir 24u nightly   - INCREASE Novolog 8-8-10-0, with 5u for snacks (has substantial snacks on weekends)  - Reviewed blood sugar goals in pregnancy. Glucose goals during pregnancy according to the American Diabetes Association:  Fasting glucose 70-95 mg/dL AND  One-hour postprandial glucose 110-140 mg/dL or  Two-hour postprandial glucose 100-120  mg/dL  - Encouraged at least 30min of activity daily.  - Baseline labs for initial pregnancy visit: Basic Metabolic Panel, BUN/Cr, TSH, LFT s, CBC, UA, Spot Urine for Creatinine, A1C  - We reviewed risks of uncontrolled hyperglycemia during pregnancy, including but not limited to: gestational hypertension/preeclampsia, increased cesarian section rate, macrosomia, shoulder dystocia, still birth, and long term risk of development of diabetes in grown child    RTC- every 4 weeks until delivery. Follow weekly with diabetes educator.  A total of 24 minutes were spent today 08/30/23 on this visit including chart review, history and counseling, documentation and other activities as detailed above.     Answers submitted by the patient for this visit:  Symptoms you have experienced in the last 30 days (Submitted on 8/30/2023)  General Symptoms: No  Skin Symptoms: No  HENT Symptoms: No  EYE SYMPTOMS: No  HEART SYMPTOMS: No  LUNG SYMPTOMS: No  INTESTINAL SYMPTOMS: No  URINARY SYMPTOMS: No  GYNECOLOGIC SYMPTOMS: No  BREAST SYMPTOMS: No  SKELETAL SYMPTOMS: No  BLOOD SYMPTOMS: No  NERVOUS SYSTEM SYMPTOMS: No  MENTAL HEALTH SYMPTOMS: No      Again, thank you for allowing me to participate in the care of your patient.        Sincerely,        Lindsey Pringle MD

## 2023-08-30 NOTE — PROGRESS NOTES
"  Video-Visit Details    Type of service:  Video Visit  Video Start Time: 2:40  Video End Time: 3:00  Originating Location (pt. Location): [place], [place]  Distant Location (provider location):  Home  Platform used for Video Visit: Daily Pringle MD    Becky Hernandez is a 38 year old year old female here for follow-up of diabetes mellitus in pregnancy via a billable video visit. Last seen by me July 2023. Following with CDE    Currently: 22w0d  Estimated Date of Delivery: Tyshawn 3, 2024  Baby: girl       1) Diabetes Mellitus in pregnancy    Diabetes History:  Diagnosis: Pre-diabetes prior to pregnancy,   5 previous pregnancies--   Following 5th pregnancy (2017), noted \"body was different\" -- felt hungry and very tired, requested testing and diagnosed with DM 18-19 weeks post partm  5th pregnancy- positive 1hr screen, neg 3 hr  Started on metformin postpartum, took for a while (approx 1 year) but felt GI upset/diarrhea/fatigue--    Hospitalizations:   Previous Regimens: metformin   Current Regimen: levemir 24u, Novolog 7 with each meal     INTERVAL HISTORY:  - Feels like overall insulin injections are going well, able to keep up with them  - Does not have backup meter, insurance wouldn't fill them  - has not changed novolog dosing    Diet:  24hr Recall: 3 meals per day  Minimal snacks, but occasionally on weekends will have extra     Exercise: works Monday-Friday, walks for exercise-  for work  Weekends at home- go to [employer]       BG check- Dexcom G7  Trends-                   BP Readings from Last 3 Encounters:   08/03/23 99/67   07/06/23 108/73   06/01/23 109/76       Lab Results   Component Value Date    A1C 6.6 02/11/2019    A1C 7.6 11/30/2018       Wt Readings from Last 3 Encounters:   08/03/23 75 kg (165 lb 6.4 oz)   07/06/23 74.3 kg (163 lb 12.8 oz)   06/01/23 72.6 kg (160 lb)       Current Outpatient Medications   Medication Sig Dispense Refill    aspirin 81 MG EC tablet Take 1 tablet (81 mg) by " mouth daily 90 tablet 2    insulin aspart (NOVOLOG PEN) 100 UNIT/ML pen Inject 8 Units Subcutaneous daily (with breakfast) AND 8 Units daily (with lunch) AND 10 Units daily (before supper). 15 mL 3    insulin detemir (LEVEMIR PEN) 100 UNIT/ML pen Inject 24 Units Subcutaneous At Bedtime 15 mL 1    Prenatal Vit-Fe Fumarate-FA (PRENATAL MULTIVITAMIN W/IRON) 27-0.8 MG tablet Take 1 tablet by mouth daily 90 tablet 3    acetaminophen (TYLENOL) 500 MG tablet Take 500-1,000 mg by mouth every 6 hours as needed for mild pain (Patient not taking: Reported on 6/1/2023)      acetone urine (KETOSTIX) test strip Use 1 strip/day with first morning urine until ketones are negative for 7 days in a row, then use 1 strip/week. 50 strip 1    Alcohol Swabs PADS Use on skin prior to fingertip glucose testing 3x per day 100 each 11    benzonatate (TESSALON) 200 MG capsule Take 1 capsule (200 mg) by mouth 3 times daily as needed for cough (Patient not taking: Reported on 6/1/2023) 30 capsule 0    blood glucose (NO BRAND SPECIFIED) test strip Use to test blood sugar 4 times daily or as directed. To accompany: Blood Glucose Monitor Brands: per insurance. 100 strip 6    blood glucose monitoring (NO BRAND SPECIFIED) meter device kit Use to test blood sugar 4 times daily or as directed. Preferred blood glucose meter OR supplies to accompany: Blood Glucose Monitor Brands: per insurance. 1 kit 0    Blood Glucose Monitoring Suppl (ACCU-CHEK GUIDE ME) w/Device KIT TEST BLOOD SUGAR FOUR TIMES DAILY      Continuous Blood Gluc Sensor (DEXCOM G7 SENSOR) MISC 1 each every 10 days Change sensor every 10 days 3 each 5    insulin pen needle (32G X 4 MM) 32G X 4 MM miscellaneous Use 4 pen needles daily or as directed. 150 each 1    thin (NO BRAND SPECIFIED) lancets Use with lanceting device. To accompany: Blood Glucose Monitor Brands: per insurance. 100 each 6          REVIEW OF SYSTEMS:   ROS: 10 point ROS neg other than the symptoms noted above in the  HPI.      EXAM:  Vitals: LMP 03/29/2023 (Exact Date)     BMIE= There is no height or weight on file to calculate BMI.  Exam:  Constitutional: healthy, alert, no acute distress  Head: Normocephalic. No masses, lesions, no exophthalmos/proptosis  ENT: normal thyroid, no palpable nodules, no cervical lymph nodes  Respiratory: nonlabored  Gastrointestinal: Abdomen soft, non-tender.  Musculoskeletal: extremities normal- no gross deformities noted, gait normal and normal muscle tone  Skin: no suspicious lesions or rashes  Neurologic: Gait normal. sensation grossly intact  Psychiatric: mentation appears normal, calm    ASSESSMENT/PLAN:  No diagnosis found.    1) Diabetes Mellitus in pregnancy  - Glucose Control- NOT at goal, continue current regimen.    - long discussion today about pathophysiology of diabetes in pregnancy, counterregulatory hormones, insulin demand/mismatch supply. We discussed we use lifestyle changes (diet/exercise) as a modifiable factor, but sometimes these are not sufficient to control blood sugars and should not be seen as a personal failure. She was very hard on herself that she has been unable to meet target glucoses with lifestyle changes. I also noted, that as pregnancy progresses, we only see rise in insulin demand and she will not make it another 20 weeks without insulin.    - Levemir 24u nightly   - INCREASE Novolog 8-8-10-0, with 5u for snacks (has substantial snacks on weekends)  - Reviewed blood sugar goals in pregnancy. Glucose goals during pregnancy according to the American Diabetes Association:  Fasting glucose 70-95 mg/dL AND  One-hour postprandial glucose 110-140 mg/dL or  Two-hour postprandial glucose 100-120 mg/dL  - Encouraged at least 30min of activity daily.  - Baseline labs for initial pregnancy visit: Basic Metabolic Panel, BUN/Cr, TSH, LFT s, CBC, UA, Spot Urine for Creatinine, A1C  - We reviewed risks of uncontrolled hyperglycemia during pregnancy, including but not limited  to: gestational hypertension/preeclampsia, increased cesarian section rate, macrosomia, shoulder dystocia, still birth, and long term risk of development of diabetes in grown child    RTC- every 4 weeks until delivery. Follow weekly with diabetes educator.  A total of 24 minutes were spent today 08/30/23 on this visit including chart review, history and counseling, documentation and other activities as detailed above.     Answers submitted by the patient for this visit:  Symptoms you have experienced in the last 30 days (Submitted on 8/30/2023)  General Symptoms: No  Skin Symptoms: No  HENT Symptoms: No  EYE SYMPTOMS: No  HEART SYMPTOMS: No  LUNG SYMPTOMS: No  INTESTINAL SYMPTOMS: No  URINARY SYMPTOMS: No  GYNECOLOGIC SYMPTOMS: No  BREAST SYMPTOMS: No  SKELETAL SYMPTOMS: No  BLOOD SYMPTOMS: No  NERVOUS SYSTEM SYMPTOMS: No  MENTAL HEALTH SYMPTOMS: No

## 2023-08-31 ENCOUNTER — PRENATAL OFFICE VISIT (OUTPATIENT)
Dept: OBGYN | Facility: CLINIC | Age: 38
End: 2023-08-31
Payer: COMMERCIAL

## 2023-08-31 VITALS
SYSTOLIC BLOOD PRESSURE: 93 MMHG | HEART RATE: 80 BPM | WEIGHT: 166.9 LBS | BODY MASS INDEX: 34.88 KG/M2 | DIASTOLIC BLOOD PRESSURE: 56 MMHG | OXYGEN SATURATION: 97 %

## 2023-08-31 DIAGNOSIS — O24.112 PREGNANCY WITH TYPE 2 DIABETES MELLITUS IN SECOND TRIMESTER: Primary | ICD-10-CM

## 2023-08-31 PROCEDURE — 99207 PR PRENATAL VISIT: CPT | Performed by: OBSTETRICS & GYNECOLOGY

## 2023-08-31 NOTE — PROGRESS NOTES
22w1d.   BP 93/56 (BP Location: Right arm, Cuff Size: Adult Regular)   Pulse 80   Wt 75.7 kg (166 lb 14.4 oz)   LMP 03/29/2023 (Exact Date)   SpO2 97%   BMI 34.88 kg/m      Doing well without issues/concerns.    Glucoses reviewed and she has a fair number of the values out of goal.  She is working with the Diabetic team to address these.   She has fetal echo scheduled and after that, we will discuss about further evaluations at [facility].   RTC 4 weeks  Ulisses Edwards MD

## 2023-09-11 ENCOUNTER — MYC MEDICAL ADVICE (OUTPATIENT)
Dept: EDUCATION SERVICES | Facility: CLINIC | Age: 38
End: 2023-09-11

## 2023-09-11 ENCOUNTER — TELEPHONE (OUTPATIENT)
Dept: FAMILY MEDICINE | Facility: CLINIC | Age: 38
End: 2023-09-11

## 2023-09-11 ENCOUNTER — VIRTUAL VISIT (OUTPATIENT)
Dept: EDUCATION SERVICES | Facility: CLINIC | Age: 38
End: 2023-09-11
Payer: COMMERCIAL

## 2023-09-11 DIAGNOSIS — E11.65 TYPE 2 DIABETES MELLITUS WITH HYPERGLYCEMIA, WITHOUT LONG-TERM CURRENT USE OF INSULIN (H): ICD-10-CM

## 2023-09-11 DIAGNOSIS — O24.112 PRE-EXISTING TYPE 2 DIABETES MELLITUS DURING PREGNANCY IN SECOND TRIMESTER: ICD-10-CM

## 2023-09-11 PROCEDURE — 98966 PH1 ASSMT&MGMT NQHP 5-10: CPT | Performed by: DIETITIAN, REGISTERED

## 2023-09-11 NOTE — PROGRESS NOTES
Diabetes and Pregnancy Follow-up  Type of Service: Telephone Visit    How would patient like to obtain AVS? Not needed    Subjective/Objective:    Becky Hernandez was called for a scheduled BG review. Last date of communication was: 8/29/23.    Diabetes is being managed with diet, activity, and medications    Taking diabetes medications: yes:     Diabetes Medication(s)       Insulin       insulin aspart (NOVOLOG PEN) 100 UNIT/ML pen    Inject 8 Units Subcutaneous daily (with breakfast) AND 8 Units daily (with lunch) AND 10 Units daily (before supper).     insulin detemir (LEVEMIR PEN) 100 UNIT/ML pen    Inject 24 Units Subcutaneous At Bedtime            Estimated Date of Delivery: Tyshawn 3, 2024    Blood Glucose/Ketone Log:            Assessment: since 8/31, last Endocrinology check in    Ketones: NA.   Fasting blood glucoses: 9% in target.  After breakfast: 73% in target.  Before lunch: -% in target.  After lunch: 36% in target.  Before dinner: -% in target.  After dinner: 40% in target.    Becky is adjusting her diet to help bring the blood sugars down a bit, is happy to see that it is helping. She has not been using her Dexcom for >1 week as a sensor failed and she is awaiting replacement. She plans to get started with this again asap, for now is using a glucometer. Fasting blood sugars still quite high and overall, post-prandial numbers elevated. Increased both long- and short-acting insulins today to help bring blood sugars into target.     Plan/Response:  Recommend increase to insulin - Novolog 8 units with meals & 5 units with snacks --> 9 units with meals, no change in snack dosing  Levemir 24 units at HS --> 28 units at HS   Follow-up in 1 week via [employer], patient declines need for phone follow ups after today, is comfortable with sending in messages via [employer]      Hazel Madrigal, ARELY, LD, [facility]   Time Spent: 8 minutes    Any diabetes medication dose changes were made via the CDE Protocol and Collaborative  Practice Agreement with the patient's endocrinology provider. A copy of this encounter was shared with the provider.

## 2023-09-11 NOTE — TELEPHONE ENCOUNTER
Blood sugar log reviewed in today's telephone encounter. See this encounter for more detail.     Hazel Madrigal RD, LD, [facility]

## 2023-09-11 NOTE — LETTER
9/11/2023         RE: Becky Hernandez  [address]  [place] [address]        Dear Colleague,    Thank you for referring your patient, Becky Hernandez, to the [facility]. Please see a copy of my visit note below.    Diabetes and Pregnancy Follow-up  Type of Service: Telephone Visit    How would patient like to obtain AVS? Not needed    Subjective/Objective:    Becky Hernandez was called for a scheduled BG review. Last date of communication was: 8/29/23.    Diabetes is being managed with diet, activity, and medications    Taking diabetes medications: yes:     Diabetes Medication(s)       Insulin       insulin aspart (NOVOLOG PEN) 100 UNIT/ML pen    Inject 8 Units Subcutaneous daily (with breakfast) AND 8 Units daily (with lunch) AND 10 Units daily (before supper).     insulin detemir (LEVEMIR PEN) 100 UNIT/ML pen    Inject 24 Units Subcutaneous At Bedtime            Estimated Date of Delivery: Tyshawn 3, 2024    Blood Glucose/Ketone Log:            Assessment: since 8/31, last Endocrinology check in    Ketones: NA.   Fasting blood glucoses: 9% in target.  After breakfast: 73% in target.  Before lunch: -% in target.  After lunch: 36% in target.  Before dinner: -% in target.  After dinner: 40% in target.    Becky is adjusting her diet to help bring the blood sugars down a bit, is happy to see that it is helping. She has not been using her Dexcom for >1 week as a sensor failed and she is awaiting replacement. She plans to get started with this again asap, for now is using a glucometer. Fasting blood sugars still quite high and overall, post-prandial numbers elevated. Increased both long- and short-acting insulins today to help bring blood sugars into target.     Plan/Response:  Recommend increase to insulin - Novolog 8 units with meals & 5 units with snacks --> 9 units with meals, no change in snack dosing  Levemir 24 units at HS --> 28 units at HS   Follow-up in 1 week via [employer], patient  declines need for phone follow ups after today, is comfortable with sending in messages via [employer]      Hazel Madrigal, RD, LD, [facility]   Time Spent: 8 minutes    Any diabetes medication dose changes were made via the CDE Protocol and Collaborative Practice Agreement with the patient's endocrinology provider. A copy of this encounter was shared with the provider.

## 2023-09-11 NOTE — TELEPHONE ENCOUNTER
Patient Quality Outreach    Patient is due for the following:   Diabetes -  A1C, Microalbumin, and Foot Exam      Topic Date Due    Hepatitis B Vaccine (1 of 3 - 3-dose series) Never done    Pneumococcal Vaccine (2 - PCV) 06/14/2020    COVID-19 Vaccine (2 - Booster for Vale series) 08/07/2021    Flu Vaccine (1) 09/01/2023       Next Steps:   Schedule a Adult Preventative    Type of outreach:    Sent [employer] message.    Next Steps:  Reach out within 90 days via [employer].    Max number of attempts reached: No. Will try again in 90 days if patient still on fail list.    Questions for provider review:    None           Paula Schulz MA

## 2023-09-13 ENCOUNTER — OFFICE VISIT (OUTPATIENT)
Dept: MATERNAL FETAL MEDICINE | Facility: CLINIC | Age: 38
End: 2023-09-13
Attending: OBSTETRICS & GYNECOLOGY
Payer: COMMERCIAL

## 2023-09-13 ENCOUNTER — HOSPITAL ENCOUNTER (OUTPATIENT)
Dept: ULTRASOUND IMAGING | Facility: CLINIC | Age: 38
Discharge: HOME | End: 2023-09-13
Attending: OBSTETRICS & GYNECOLOGY
Payer: COMMERCIAL

## 2023-09-13 ENCOUNTER — HOSPITAL ENCOUNTER (OUTPATIENT)
Dept: CARDIOLOGY | Facility: CLINIC | Age: 38
Discharge: HOME | End: 2023-09-13
Attending: OBSTETRICS & GYNECOLOGY
Payer: COMMERCIAL

## 2023-09-13 DIAGNOSIS — O24.112 PRE-EXISTING TYPE 2 DIABETES MELLITUS DURING PREGNANCY IN SECOND TRIMESTER: ICD-10-CM

## 2023-09-13 DIAGNOSIS — O24.112 PRE-EXISTING TYPE 2 DIABETES MELLITUS DURING PREGNANCY IN SECOND TRIMESTER: Primary | ICD-10-CM

## 2023-09-13 PROCEDURE — 76827 ECHO EXAM OF FETAL HEART: CPT | Mod: 26 | Performed by: PEDIATRICS

## 2023-09-13 PROCEDURE — 76825 ECHO EXAM OF FETAL HEART: CPT | Mod: 26 | Performed by: PEDIATRICS

## 2023-09-13 PROCEDURE — 76816 OB US FOLLOW-UP PER FETUS: CPT | Mod: 26 | Performed by: OBSTETRICS & GYNECOLOGY

## 2023-09-13 PROCEDURE — 93325 DOPPLER ECHO COLOR FLOW MAPG: CPT | Mod: 26 | Performed by: PEDIATRICS

## 2023-09-13 PROCEDURE — 93325 DOPPLER ECHO COLOR FLOW MAPG: CPT

## 2023-09-13 PROCEDURE — 76816 OB US FOLLOW-UP PER FETUS: CPT

## 2023-09-13 NOTE — NURSING NOTE
Patient reports positive fetal movement, no pain, no contractions, leaking of fluid, or bleeding.  Reports blood sugar values fasting 101 and 2 hr post prandial 120-130.  Patient has been working on her diet, working with diabetes to review her log and insulin has been increased. Patient denies headache, visual changes, nausea/vomiting, epigastric pain related to preeclampsia.  Education provided to patient on growth and counting your baby movements.  SBAR given to [facility]ILB[facility], see their note in Epic.   Jaycee Harp RN

## 2023-09-20 ENCOUNTER — MYC MEDICAL ADVICE (OUTPATIENT)
Dept: EDUCATION SERVICES | Facility: CLINIC | Age: 38
End: 2023-09-20
Payer: COMMERCIAL

## 2023-09-20 DIAGNOSIS — O24.112 PRE-EXISTING TYPE 2 DIABETES MELLITUS DURING PREGNANCY IN SECOND TRIMESTER: ICD-10-CM

## 2023-09-20 DIAGNOSIS — E11.65 TYPE 2 DIABETES MELLITUS WITH HYPERGLYCEMIA, WITHOUT LONG-TERM CURRENT USE OF INSULIN (H): ICD-10-CM

## 2023-09-20 NOTE — TELEPHONE ENCOUNTER
Diabetes and Pregnancy Follow-up  [facility]    How would patient like to obtain AVS? Not needed    Subjective/Objective:    Becky Hernandez was called for a scheduled BG review. Last date of communication was: .    Gestational diabetes is being managed with diet, activity, and medications    Taking diabetes medications: yes:     Diabetes Medication(s)       Insulin       insulin aspart (NOVOLOG PEN) 100 UNIT/ML pen    Inject 9 Units Subcutaneous daily (with breakfast) AND 9 Units daily (with lunch) AND 9 Units daily (before supper).     insulin detemir (LEVEMIR PEN) 100 UNIT/ML pen    Inject 28 Units Subcutaneous At Bedtime          Morning!   Here is the updated of gestational data.   -28 units (Levemir) at night.      -9 units before breakfast and lunch (Novolog)/ sometimes I do 10 units if I have bigger meals during breakfast or lunch.      -5 units snacks (Novolog)      -10 units before supper. Supper usually a big meals night. (Novolog)     Estimated Date of Delivery: Tyshawn 3, 2024    Blood Glucose/Ketone Log:          Assessment:    Ketones: .   Fasting blood glucoses: 30% in target.  After breakfast: 22% in target.  Before lunch: x% in target.  After lunch: 25% in target.  Before dinner: x% in target.  After dinner: 12% in target.    Plan/Response:  Recommend increase to insulin -   Levemir: 0-0-0-28 --> 0-0-0-30    Novolo-9-10-0 --> 11-11-12-0     Rupali Hung RD [facility] [place]  Triage: [phone]  [phone]      Any diabetes medication dose changes were made via the CDE Protocol and Collaborative Practice Agreement with the patient's referring provider. A copy of this encounter was shared with the provider.

## 2023-09-21 ENCOUNTER — PRENATAL OFFICE VISIT (OUTPATIENT)
Dept: OBGYN | Facility: CLINIC | Age: 38
End: 2023-09-21
Payer: COMMERCIAL

## 2023-09-21 VITALS
DIASTOLIC BLOOD PRESSURE: 66 MMHG | SYSTOLIC BLOOD PRESSURE: 99 MMHG | HEART RATE: 88 BPM | BODY MASS INDEX: 35.49 KG/M2 | WEIGHT: 169.8 LBS | OXYGEN SATURATION: 97 %

## 2023-09-21 DIAGNOSIS — O24.112 PRE-EXISTING TYPE 2 DIABETES MELLITUS DURING PREGNANCY IN SECOND TRIMESTER: ICD-10-CM

## 2023-09-21 DIAGNOSIS — O24.112 PREGNANCY WITH TYPE 2 DIABETES MELLITUS IN SECOND TRIMESTER: Primary | ICD-10-CM

## 2023-09-21 DIAGNOSIS — E11.65 TYPE 2 DIABETES MELLITUS WITH HYPERGLYCEMIA, WITHOUT LONG-TERM CURRENT USE OF INSULIN (H): ICD-10-CM

## 2023-09-21 LAB
ALBUMIN SERPL BCG-MCNC: 3.7 G/DL (ref 3.5–5.2)
ALP SERPL-CCNC: 57 U/L (ref 35–104)
ALT SERPL W P-5'-P-CCNC: 8 U/L (ref 0–50)
ANION GAP SERPL CALCULATED.3IONS-SCNC: 11 MMOL/L (ref 7–15)
AST SERPL W P-5'-P-CCNC: 18 U/L (ref 0–45)
BILIRUB SERPL-MCNC: 0.3 MG/DL
BUN SERPL-MCNC: 6.7 MG/DL (ref 6–20)
CALCIUM SERPL-MCNC: 9.3 MG/DL (ref 8.6–10)
CHLORIDE SERPL-SCNC: 106 MMOL/L (ref 98–107)
CREAT SERPL-MCNC: 0.6 MG/DL (ref 0.51–0.95)
CREAT UR-MCNC: 142 MG/DL
DEPRECATED HCO3 PLAS-SCNC: 22 MMOL/L (ref 22–29)
EGFRCR SERPLBLD CKD-EPI 2021: >90 ML/MIN/1.73M2
GLUCOSE SERPL-MCNC: 112 MG/DL (ref 70–99)
HBA1C MFR BLD: 6.5 % (ref 0–5.6)
HGB BLD-MCNC: 10.8 G/DL (ref 11.7–15.7)
MICROALBUMIN UR-MCNC: <12 MG/L
MICROALBUMIN/CREAT UR: NORMAL MG/G{CREAT}
POTASSIUM SERPL-SCNC: 3.7 MMOL/L (ref 3.4–5.3)
PROT SERPL-MCNC: 6.5 G/DL (ref 6.4–8.3)
SODIUM SERPL-SCNC: 139 MMOL/L (ref 136–145)
T PALLIDUM AB SER QL: NONREACTIVE
TSH SERPL DL<=0.005 MIU/L-ACNC: 1.27 UIU/ML (ref 0.3–4.2)

## 2023-09-21 PROCEDURE — 80053 COMPREHEN METABOLIC PANEL: CPT | Performed by: OBSTETRICS & GYNECOLOGY

## 2023-09-21 PROCEDURE — 84443 ASSAY THYROID STIM HORMONE: CPT | Performed by: OBSTETRICS & GYNECOLOGY

## 2023-09-21 PROCEDURE — 82570 ASSAY OF URINE CREATININE: CPT | Performed by: OBSTETRICS & GYNECOLOGY

## 2023-09-21 PROCEDURE — 83036 HEMOGLOBIN GLYCOSYLATED A1C: CPT | Performed by: OBSTETRICS & GYNECOLOGY

## 2023-09-21 PROCEDURE — 86780 TREPONEMA PALLIDUM: CPT | Performed by: OBSTETRICS & GYNECOLOGY

## 2023-09-21 PROCEDURE — 85018 HEMOGLOBIN: CPT | Performed by: OBSTETRICS & GYNECOLOGY

## 2023-09-21 PROCEDURE — 99207 PR PRENATAL VISIT: CPT | Performed by: OBSTETRICS & GYNECOLOGY

## 2023-09-21 PROCEDURE — 82043 UR ALBUMIN QUANTITATIVE: CPT | Performed by: OBSTETRICS & GYNECOLOGY

## 2023-09-21 PROCEDURE — 82306 VITAMIN D 25 HYDROXY: CPT | Performed by: OBSTETRICS & GYNECOLOGY

## 2023-09-21 PROCEDURE — 36415 COLL VENOUS BLD VENIPUNCTURE: CPT | Performed by: OBSTETRICS & GYNECOLOGY

## 2023-09-22 LAB — DEPRECATED CALCIDIOL+CALCIFEROL SERPL-MC: 25 UG/L (ref 20–75)

## 2023-09-27 ENCOUNTER — VIRTUAL VISIT (OUTPATIENT)
Dept: ENDOCRINOLOGY | Facility: CLINIC | Age: 38
End: 2023-09-27
Payer: COMMERCIAL

## 2023-09-27 DIAGNOSIS — E11.65 TYPE 2 DIABETES MELLITUS WITH HYPERGLYCEMIA, WITHOUT LONG-TERM CURRENT USE OF INSULIN (H): Primary | ICD-10-CM

## 2023-09-27 DIAGNOSIS — O24.112 PRE-EXISTING TYPE 2 DIABETES MELLITUS DURING PREGNANCY IN SECOND TRIMESTER: ICD-10-CM

## 2023-09-27 PROCEDURE — 99213 OFFICE O/P EST LOW 20 MIN: CPT | Mod: VID | Performed by: INTERNAL MEDICINE

## 2023-09-27 NOTE — LETTER
"    9/27/2023         RE: Becky Hernandez  [address]  [place] [address]        Dear Colleague,    Thank you for referring your patient, Becky Hernandez, to the [facility] [place]. Please see a copy of my visit note below.      Video-Visit Details    Type of service:  Video Visit  Video Start Time: 2:45  Video End Time: 3:05  Originating Location (pt. Location): [place], [place]  Distant Location (provider location):  Home  Platform used for Video Visit: Daily Pringle MD    Becky Hernandez is a 38 year old year old female here for follow-up of diabetes mellitus in pregnancy via a billable video visit. Last seen by me Aug 2023. Following with CDE    Currently: 26w0d  Estimated Date of Delivery: Tyshawn 3, 2024  Baby: girl       1) Diabetes Mellitus in pregnancy    Diabetes History:  Diagnosis: Pre-diabetes prior to pregnancy,   5 previous pregnancies--   Following 5th pregnancy (2017), noted \"body was different\" -- felt hungry and very tired, requested testing and diagnosed with DM 18-19 weeks post partm  5th pregnancy- positive 1hr screen, neg 3 hr  Started on metformin postpartum, took for a while (approx 1 year) but felt GI upset/diarrhea/fatigue--    Hospitalizations: none  Previous Regimens: metformin   Current Regimen: levemir 30u, Novolog 11-11-13-0, with 5u     INTERVAL HISTORY:  - Doesn't eat many snacks so not using many snack dosing   Feels like overall insulin injections are going well, able to keep up with them  - Dosing before meals about 10 min  - Feels like blood sugars should be better, wondering if we've made any progress    Diet:  24hr Recall: 3 meals per day  Minimal snacks, but occasionally on weekends will have extra     Exercise: works Monday-Friday, walks for exercise-  for work  Weekends at home- go to [place]         BG check- Dexcom G7  Trends-   Elevated AM fasting-- mostly >100  Spikes with meals, sylvain breakfast and dinner, will come down within a few " "hours  No lows  Persistent rise early afternoon-- this is when she comes home \"and just sits\"                BP Readings from Last 3 Encounters:   09/21/23 99/66   08/31/23 93/56   08/03/23 99/67       Lab Results   Component Value Date    A1C 6.6 02/11/2019    A1C 7.6 11/30/2018       Wt Readings from Last 3 Encounters:   09/21/23 77 kg (169 lb 12.8 oz)   08/31/23 75.7 kg (166 lb 14.4 oz)   08/03/23 75 kg (165 lb 6.4 oz)       Current Outpatient Medications   Medication Sig Dispense Refill     aspirin 81 MG EC tablet Take 1 tablet (81 mg) by mouth daily 90 tablet 2     Continuous Blood Gluc Sensor (DEXCOM G7 SENSOR) MISC 1 each every 10 days Change sensor every 10 days 3 each 5     insulin aspart (NOVOLOG PEN) 100 UNIT/ML pen Inject 11 Units Subcutaneous daily (with breakfast) AND 11 Units daily (with lunch) AND 13 Units daily (before supper). Plus 5 for snacks. TDD: Up to 50 units 15 mL 3     insulin detemir (LEVEMIR PEN) 100 UNIT/ML pen Inject 30 Units Subcutaneous At Bedtime 15 mL 1     Prenatal Vit-Fe Fumarate-FA (PRENATAL MULTIVITAMIN W/IRON) 27-0.8 MG tablet Take 1 tablet by mouth daily 90 tablet 3     acetaminophen (TYLENOL) 500 MG tablet Take 500-1,000 mg by mouth every 6 hours as needed for mild pain       acetone urine (KETOSTIX) test strip Use 1 strip/day with first morning urine until ketones are negative for 7 days in a row, then use 1 strip/week. 50 strip 1     Alcohol Swabs PADS Use on skin prior to fingertip glucose testing 3x per day 100 each 11     benzonatate (TESSALON) 200 MG capsule Take 1 capsule (200 mg) by mouth 3 times daily as needed for cough (Patient not taking: Reported on 9/27/2023) 30 capsule 0     blood glucose (NO BRAND SPECIFIED) test strip Use to test blood sugar 4 times daily or as directed. To accompany: Blood Glucose Monitor Brands: per insurance. 100 strip 6     blood glucose monitoring (NO BRAND SPECIFIED) meter device kit Use to test blood sugar 4 times daily or as directed. " Preferred blood glucose meter OR supplies to accompany: Blood Glucose Monitor Brands: per insurance. 1 kit 0     Blood Glucose Monitoring Suppl (ACCU-CHEK GUIDE ME) w/Device KIT TEST BLOOD SUGAR FOUR TIMES DAILY       insulin pen needle (32G X 4 MM) 32G X 4 MM miscellaneous Use 4 pen needles daily or as directed. 150 each 1     thin (NO BRAND SPECIFIED) lancets Use with lanceting device. To accompany: Blood Glucose Monitor Brands: per insurance. 100 each 6          REVIEW OF SYSTEMS:   ROS: 10 point ROS neg other than the symptoms noted above in the HPI.      EXAM:  Vitals: LMP 03/29/2023 (Exact Date)     BMIE= There is no height or weight on file to calculate BMI.  Physical Exam (visual exam)  VS:  no vital signs taken for video visit  CONSTITUTIONAL: healthy, alert and NAD, responding appropriately  ENT: normocephalic, no visual evidence of trauma, normal nose and oral mucosa  EYES: conjunctivae and sclerae normal, no exophthalmos or proptosis  THYROID:  no visualized nodules or goiter  LUNGS: no audible wheeze, cough or visible cyanosis, no visible retractions or increased work of breathing  EXTREMITIES: no hand tremors  NEUROLOGY: cranial nerves grossly intact with no obvious deficit.  SKIN:  no visualized skin lesions or rash, no edema visualized  PSYCH: mentation appears normal, normal judgement      ASSESSMENT/PLAN:  No diagnosis found.    1) Diabetes Mellitus in pregnancy  - Glucose Control- NOT at goal,   - INCREASE Levemir 36u nightly   - INCREASE Novolog 15-15-17-0, with 5u for snacks (has substantial snacks on weekends)  - Reviewed blood sugar goals in pregnancy. Glucose goals during pregnancy according to the American Diabetes Association:  Fasting glucose 70-95 mg/dL AND  One-hour postprandial glucose 110-140 mg/dL or  Two-hour postprandial glucose 100-120 mg/dL  - Encouraged at least 30min of activity daily.  - We reviewed risks of uncontrolled hyperglycemia during pregnancy, including but not limited  to: gestational hypertension/preeclampsia, increased cesarian section rate, macrosomia, shoulder dystocia, still birth, and long term risk of development of diabetes in grown child    RTC- every 4 weeks until delivery. Follow weekly with diabetes educator.      A total of 20 minutes were spent today 09/27/23 on this visit including chart review, history and counseling, documentation and other activities as detailed above.       Again, thank you for allowing me to participate in the care of your patient.        Sincerely,        Lindsey Pringle MD

## 2023-09-27 NOTE — PROGRESS NOTES
"  Video-Visit Details    Type of service:  Video Visit  Video Start Time: 2:45  Video End Time: 3:05  Originating Location (pt. Location): Home, [place]  Distant Location (provider location):  Home  Platform used for Video Visit: Daily Pringle MD    Becky Hernandez is a 38 year old year old female here for follow-up of diabetes mellitus in pregnancy via a billable video visit. Last seen by me Aug 2023. Following with CDE    Currently: 26w0d  Estimated Date of Delivery: Tyshawn 3, 2024  Baby: girl       1) Diabetes Mellitus in pregnancy    Diabetes History:  Diagnosis: Pre-diabetes prior to pregnancy,   5 previous pregnancies--   Following 5th pregnancy (2017), noted \"body was different\" -- felt hungry and very tired, requested testing and diagnosed with DM 18-19 weeks post partm  5th pregnancy- positive 1hr screen, neg 3 hr  Started on metformin postpartum, took for a while (approx 1 year) but felt GI upset/diarrhea/fatigue--    Hospitalizations: none  Previous Regimens: metformin   Current Regimen: levemir 30u, Novolog 11-11-13-0, with 5u     INTERVAL HISTORY:  - Doesn't eat many snacks so not using many snack dosing   Feels like overall insulin injections are going well, able to keep up with them  - Dosing before meals about 10 min  - Feels like blood sugars should be better, wondering if we've made any progress    Diet:  24hr Recall: 3 meals per day  Minimal snacks, but occasionally on weekends will have extra     Exercise: works Monday-Friday, walks for exercise-  for work  Weekends at home- go to [employer]         BG check- Dexcom G7  Trends-   Elevated AM fasting-- mostly >100  Spikes with meals, sylvain breakfast and dinner, will come down within a few hours  No lows  Persistent rise early afternoon-- this is when she comes home \"and just sits\"                BP Readings from Last 3 Encounters:   09/21/23 99/66   08/31/23 93/56   08/03/23 99/67       Lab Results   Component Value Date    A1C 6.6 " 02/11/2019    A1C 7.6 11/30/2018       Wt Readings from Last 3 Encounters:   09/21/23 77 kg (169 lb 12.8 oz)   08/31/23 75.7 kg (166 lb 14.4 oz)   08/03/23 75 kg (165 lb 6.4 oz)       Current Outpatient Medications   Medication Sig Dispense Refill    aspirin 81 MG EC tablet Take 1 tablet (81 mg) by mouth daily 90 tablet 2    Continuous Blood Gluc Sensor (DEXCOM G7 SENSOR) MISC 1 each every 10 days Change sensor every 10 days 3 each 5    insulin aspart (NOVOLOG PEN) 100 UNIT/ML pen Inject 11 Units Subcutaneous daily (with breakfast) AND 11 Units daily (with lunch) AND 13 Units daily (before supper). Plus 5 for snacks. TDD: Up to 50 units 15 mL 3    insulin detemir (LEVEMIR PEN) 100 UNIT/ML pen Inject 30 Units Subcutaneous At Bedtime 15 mL 1    Prenatal Vit-Fe Fumarate-FA (PRENATAL MULTIVITAMIN W/IRON) 27-0.8 MG tablet Take 1 tablet by mouth daily 90 tablet 3    acetaminophen (TYLENOL) 500 MG tablet Take 500-1,000 mg by mouth every 6 hours as needed for mild pain      acetone urine (KETOSTIX) test strip Use 1 strip/day with first morning urine until ketones are negative for 7 days in a row, then use 1 strip/week. 50 strip 1    Alcohol Swabs PADS Use on skin prior to fingertip glucose testing 3x per day 100 each 11    benzonatate (TESSALON) 200 MG capsule Take 1 capsule (200 mg) by mouth 3 times daily as needed for cough (Patient not taking: Reported on 9/27/2023) 30 capsule 0    blood glucose (NO BRAND SPECIFIED) test strip Use to test blood sugar 4 times daily or as directed. To accompany: Blood Glucose Monitor Brands: per insurance. 100 strip 6    blood glucose monitoring (NO BRAND SPECIFIED) meter device kit Use to test blood sugar 4 times daily or as directed. Preferred blood glucose meter OR supplies to accompany: Blood Glucose Monitor Brands: per insurance. 1 kit 0    Blood Glucose Monitoring Suppl (ACCU-CHEK GUIDE ME) w/Device KIT TEST BLOOD SUGAR FOUR TIMES DAILY      insulin pen needle (32G X 4 MM) 32G X 4 MM  miscellaneous Use 4 pen needles daily or as directed. 150 each 1    thin (NO BRAND SPECIFIED) lancets Use with lanceting device. To accompany: Blood Glucose Monitor Brands: per insurance. 100 each 6          REVIEW OF SYSTEMS:   ROS: 10 point ROS neg other than the symptoms noted above in the HPI.      EXAM:  Vitals: LMP 03/29/2023 (Exact Date)     BMIE= There is no height or weight on file to calculate BMI.  Physical Exam (visual exam)  VS:  no vital signs taken for video visit  CONSTITUTIONAL: healthy, alert and NAD, responding appropriately  ENT: normocephalic, no visual evidence of trauma, normal nose and oral mucosa  EYES: conjunctivae and sclerae normal, no exophthalmos or proptosis  THYROID:  no visualized nodules or goiter  LUNGS: no audible wheeze, cough or visible cyanosis, no visible retractions or increased work of breathing  EXTREMITIES: no hand tremors  NEUROLOGY: cranial nerves grossly intact with no obvious deficit.  SKIN:  no visualized skin lesions or rash, no edema visualized  PSYCH: mentation appears normal, normal judgement      ASSESSMENT/PLAN:  No diagnosis found.    1) Diabetes Mellitus in pregnancy  - Glucose Control- NOT at goal,   - INCREASE Levemir 36u nightly   - INCREASE Novolog 15-15-17-0, with 5u for snacks (has substantial snacks on weekends)  - Reviewed blood sugar goals in pregnancy. Glucose goals during pregnancy according to the American Diabetes Association:  Fasting glucose 70-95 mg/dL AND  One-hour postprandial glucose 110-140 mg/dL or  Two-hour postprandial glucose 100-120 mg/dL  - Encouraged at least 30min of activity daily.  - We reviewed risks of uncontrolled hyperglycemia during pregnancy, including but not limited to: gestational hypertension/preeclampsia, increased cesarian section rate, macrosomia, shoulder dystocia, still birth, and long term risk of development of diabetes in grown child    RTC- every 4 weeks until delivery. Follow weekly with diabetes  educator.      A total of 20 minutes were spent today 09/27/23 on this visit including chart review, history and counseling, documentation and other activities as detailed above.

## 2023-09-27 NOTE — PATIENT INSTRUCTIONS
Levemir 36 at bedtime  Novolog 15u with breakfast and lunch   17u with dinner-- if by Saturday, still >140 at 1hr, increase to 18u

## 2023-10-06 ENCOUNTER — MYC MEDICAL ADVICE (OUTPATIENT)
Dept: EDUCATION SERVICES | Facility: CLINIC | Age: 38
End: 2023-10-06
Payer: COMMERCIAL

## 2023-10-06 DIAGNOSIS — E11.65 TYPE 2 DIABETES MELLITUS WITH HYPERGLYCEMIA, WITHOUT LONG-TERM CURRENT USE OF INSULIN (H): ICD-10-CM

## 2023-10-06 DIAGNOSIS — O24.112 PRE-EXISTING TYPE 2 DIABETES MELLITUS DURING PREGNANCY IN SECOND TRIMESTER: ICD-10-CM

## 2023-10-06 NOTE — TELEPHONE ENCOUNTER
Gestational Diabetes Follow-up    Subjective/Objective:    Becky Hernandez sent in blood glucose log for review. Last date of communication was: 23 with endo.    Gestational diabetes is being managed with diet, activity, and medications    Taking diabetes medications: yes:     Diabetes Medication(s)       Insulin       insulin aspart (NOVOLOG PEN) 100 UNIT/ML pen    Inject 11 Units Subcutaneous daily (with breakfast) AND 11 Units daily (with lunch) AND 13 Units daily (before supper). Plus 5 for snacks. TDD: Up to 50 units     insulin detemir (LEVEMIR PEN) 100 UNIT/ML pen    Inject 30 Units Subcutaneous At Bedtime            Estimated Date of Delivery: Tyshawn 3, 2024    BG/Food Lo/28- novolog: 15 units for breakfast and lunch 17 units for dinner   increase NovoLog to 18 units  10/01-10/05 Levemir: 36 units   Novolog: 15 units breakfast and lunch, 18 units dinner, 5 units for snacks.     Assessment:  Blood sugars remain elevated between meals.  Recommend increase to all doses.  Will check if patient is able to zohra meals or rapid acting doses in dexcom to track meal and snacks.      Plan/Response:  Recommend increase to insulin - Levemir 0-0-0-36 --> 0-0-0-44, Novolog 15-15-18-0 plus 5 units for snacks --> 17-17-20-0, continue 5 units for snacks.    Track rapid actin in dexcom if possible.    Dot Soriano MS, RD, LD, CDE      Any diabetes medication dose changes were made via the CDE Protocol and Collaborative Practice Agreement with the patient's endocrinology provider. A copy of this encounter was shared with the provider.

## 2023-10-12 ENCOUNTER — OFFICE VISIT (OUTPATIENT)
Dept: MATERNAL FETAL MEDICINE | Facility: CLINIC | Age: 38
End: 2023-10-12
Attending: OBSTETRICS & GYNECOLOGY
Payer: COMMERCIAL

## 2023-10-12 ENCOUNTER — HOSPITAL ENCOUNTER (OUTPATIENT)
Dept: ULTRASOUND IMAGING | Facility: CLINIC | Age: 38
Discharge: HOME | End: 2023-10-12
Attending: OBSTETRICS & GYNECOLOGY
Payer: COMMERCIAL

## 2023-10-12 VITALS — DIASTOLIC BLOOD PRESSURE: 62 MMHG | SYSTOLIC BLOOD PRESSURE: 95 MMHG

## 2023-10-12 DIAGNOSIS — O36.5990 FETAL GROWTH RESTRICTION ANTEPARTUM: Primary | ICD-10-CM

## 2023-10-12 DIAGNOSIS — O24.112 PRE-EXISTING TYPE 2 DIABETES MELLITUS DURING PREGNANCY IN SECOND TRIMESTER: ICD-10-CM

## 2023-10-12 PROCEDURE — 99213 OFFICE O/P EST LOW 20 MIN: CPT | Mod: 25 | Performed by: OBSTETRICS & GYNECOLOGY

## 2023-10-12 PROCEDURE — 59025 FETAL NON-STRESS TEST: CPT | Mod: 26 | Performed by: OBSTETRICS & GYNECOLOGY

## 2023-10-12 PROCEDURE — 59025 FETAL NON-STRESS TEST: CPT

## 2023-10-12 PROCEDURE — 76816 OB US FOLLOW-UP PER FETUS: CPT | Mod: 26 | Performed by: OBSTETRICS & GYNECOLOGY

## 2023-10-12 PROCEDURE — 76816 OB US FOLLOW-UP PER FETUS: CPT

## 2023-10-12 PROCEDURE — 76820 UMBILICAL ARTERY ECHO: CPT | Mod: 26 | Performed by: OBSTETRICS & GYNECOLOGY

## 2023-10-12 NOTE — PROGRESS NOTES
"Please see \"Imaging\" tab under \"Chart Review\" for details of today's US at the [facility].    Eliel Riggs MD  Maternal-Fetal Medicine    "

## 2023-10-12 NOTE — NURSING NOTE
NST Performed due to FGR.   reviewed efm tracing. See NST/BPP Doc Flowsheet tab.    Pt reports positive fetal movement, denies other concerns at this time.  SBAR given to MD.

## 2023-10-13 ENCOUNTER — MYC MEDICAL ADVICE (OUTPATIENT)
Dept: EDUCATION SERVICES | Facility: CLINIC | Age: 38
End: 2023-10-13
Payer: COMMERCIAL

## 2023-10-16 NOTE — TELEPHONE ENCOUNTER
Type 2 Diabetes + Pregnancy Follow-up    Subjective/Objective:    Becky Hernandez sent in blood glucose log for review. Last date of communication was: 10/6/23.    Diabetes is being managed with diet, activity, and medications    Taking diabetes medications: yes:     Diabetes Medication(s)       Insulin       insulin aspart (NOVOLOG PEN) 100 UNIT/ML pen    Inject 17 Units Subcutaneous daily (with breakfast) AND 17 Units daily (with lunch) AND 20 Units daily (before supper). Plus 5 for snacks. TDD: Up to 65 units     insulin detemir (LEVEMIR PEN) 100 UNIT/ML pen    Inject 44 Units Subcutaneous at bedtime            Estimated Date of Delivery: Tyshawn 3, 2024    BG/Food Log: No sensor worn between 10/6 & 10/14            Assessment:    Ketones: -.   Fasting blood glucoses: 71% in target.  After breakfast: 83% in target.  Before lunch: -% in target.  After lunch: 50% in target.  Before dinner: -% in target.  After dinner: 100% in target.    Plan/Response:  Recommend increase to insulin -   Levemir - no change   Novolog 17-17-20-0 + 5 with snacks --> 20-17-20-0 + 5 with snacks   Follow-up in 1 week.  See TreeRing message for patient communications.       Hazel Madrigal RD, LD, CDCES     Any diabetes medication dose changes were made via the CDE Protocol and Collaborative Practice Agreement with the patient's endocrinology provider. A copy of this encounter was shared with the provider.

## 2023-10-18 ENCOUNTER — OFFICE VISIT (OUTPATIENT)
Dept: MATERNAL FETAL MEDICINE | Facility: CLINIC | Age: 38
End: 2023-10-18
Attending: OBSTETRICS & GYNECOLOGY
Payer: COMMERCIAL

## 2023-10-18 ENCOUNTER — HOSPITAL ENCOUNTER (OUTPATIENT)
Dept: ULTRASOUND IMAGING | Facility: CLINIC | Age: 38
Discharge: HOME | End: 2023-10-18
Attending: OBSTETRICS & GYNECOLOGY
Payer: COMMERCIAL

## 2023-10-18 VITALS — SYSTOLIC BLOOD PRESSURE: 94 MMHG | DIASTOLIC BLOOD PRESSURE: 60 MMHG

## 2023-10-18 DIAGNOSIS — O36.5990 FETAL GROWTH RESTRICTION ANTEPARTUM: ICD-10-CM

## 2023-10-18 PROCEDURE — 59025 FETAL NON-STRESS TEST: CPT

## 2023-10-18 PROCEDURE — 76820 UMBILICAL ARTERY ECHO: CPT | Mod: 26 | Performed by: OBSTETRICS & GYNECOLOGY

## 2023-10-18 PROCEDURE — 76815 OB US LIMITED FETUS(S): CPT | Mod: 26 | Performed by: OBSTETRICS & GYNECOLOGY

## 2023-10-18 PROCEDURE — 76815 OB US LIMITED FETUS(S): CPT

## 2023-10-18 PROCEDURE — 59025 FETAL NON-STRESS TEST: CPT | Mod: 26 | Performed by: OBSTETRICS & GYNECOLOGY

## 2023-10-18 NOTE — NURSING NOTE
Patient reports feeling fetal movement, denies pain, denies contractions, leaking of fluid, or bleeding.  Reports blood sugar values fasting and hr post prandial are up and down, still struggling with regulating sugars and diet.  Education provided to patient on today's ultrasound and NST.  SBAR given to GILBERT MART, see their note in Epic. NST Performed due to FGR.  Dr. Monterroso reviewed efm tracing. See NST/BPP Doc Flowsheet tab.

## 2023-10-18 NOTE — PROGRESS NOTES
Please see full imaging report from ViewPoint program under imaging tab.    Nirali Monterroso MD  Maternal Fetal Medicine

## 2023-10-19 ENCOUNTER — PRENATAL OFFICE VISIT (OUTPATIENT)
Dept: OBGYN | Facility: CLINIC | Age: 38
End: 2023-10-19
Payer: COMMERCIAL

## 2023-10-19 VITALS
BODY MASS INDEX: 35.49 KG/M2 | HEART RATE: 81 BPM | WEIGHT: 169.8 LBS | DIASTOLIC BLOOD PRESSURE: 64 MMHG | OXYGEN SATURATION: 100 % | SYSTOLIC BLOOD PRESSURE: 97 MMHG

## 2023-10-19 DIAGNOSIS — O24.112 PREGNANCY WITH TYPE 2 DIABETES MELLITUS IN SECOND TRIMESTER: Primary | ICD-10-CM

## 2023-10-19 DIAGNOSIS — Z23 NEED FOR DIPHTHERIA-TETANUS-PERTUSSIS (TDAP) VACCINE: ICD-10-CM

## 2023-10-19 PROCEDURE — 90471 IMMUNIZATION ADMIN: CPT | Performed by: OBSTETRICS & GYNECOLOGY

## 2023-10-19 PROCEDURE — 99207 PR PRENATAL VISIT: CPT | Performed by: OBSTETRICS & GYNECOLOGY

## 2023-10-19 PROCEDURE — 90715 TDAP VACCINE 7 YRS/> IM: CPT | Performed by: OBSTETRICS & GYNECOLOGY

## 2023-10-19 NOTE — PROGRESS NOTES
Current Status    Updated on: 10/19/2023  2:44 PM    Name Date Status Dose VIS Date Route Site  Lot# Given By Verified By   TDAP (Adacel,Boostrix) 10/19/2023 Given 0.5 mL 08/06/2021, Given Today IM LD [employer] [id number] Aleena Hoskins CMA --   Exp. Date NDC # Product Time Location External Inventory Class Comment   9/19/2025 [id number] Adacel  2:44 PM -- -- -- --   Updated by: Aleena Hoskins CMA

## 2023-10-19 NOTE — PROGRESS NOTES
29w1d   Tired.  No HA, visual changes, N/V   She has seen MFM and there is fetal growth restriction.  We discussed the BPPs and growth ultrasounds.    She reports glucoses are better controlled with the insulin.   RTC 2 weeks.   Ulisses Edwards MD     [address] [place] [address]  Phone: [phone]  Fax: [phone],         October 1, 2018     Patient: Washington   YOB: 1993   Date of Visit: 10/1/2018       To Whom It May Concern:    [place] Garza [place] [facility] [place]. It is my medical opinion that Washington may return to work on 10/2/18. If you have any questions or concerns, please don't hesitate to call.     Sincerely,        Aletha Arriaza DO

## 2023-10-19 NOTE — NURSING NOTE
Prior to immunization administration, verified patients identity using patient s name and date of birth. Please see Immunization Activity for additional information.     Screening Questionnaire for Adult Immunization    Are you sick today?   No   Do you have allergies to medications, food, a vaccine component or latex?   No   Have you ever had a serious reaction after receiving a vaccination?   No   Do you have a long-term health problem with heart, lung, kidney, or metabolic disease (e.g., diabetes), asthma, a blood disorder, no spleen, complement component deficiency, a cochlear implant, or a spinal fluid leak?  Are you on long-term aspirin therapy?   No   Do you have cancer, leukemia, HIV/AIDS, or any other immune system problem?   No   Do you have a parent, brother, or sister with an immune system problem?   No   In the past 3 months, have you taken medications that affect  your immune system, such as prednisone, other steroids, or anticancer drugs; drugs for the treatment of rheumatoid arthritis, Crohn s disease, or psoriasis; or have you had radiation treatments?   No   Have you had a seizure, or a brain or other nervous system problem?   No   During the past year, have you received a transfusion of blood or blood    products, or been given immune (gamma) globulin or antiviral drug?   No   For women: Are you pregnant or is there a chance you could become       pregnant during the next month?   Yes   Have you received any vaccinations in the past 4 weeks?   No     Immunization questionnaire was positive for at least one answer.  Notified [facility].      Patient instructed to remain in clinic for 15 minutes afterwards, and to report any adverse reactions.     Screening performed by Aleena Hoskins CMA on 10/19/2023 at 2:45 PM.

## 2023-10-24 ENCOUNTER — HOSPITAL ENCOUNTER (OUTPATIENT)
Dept: ULTRASOUND IMAGING | Facility: CLINIC | Age: 38
Discharge: HOME | End: 2023-10-24
Attending: OBSTETRICS & GYNECOLOGY
Payer: COMMERCIAL

## 2023-10-24 ENCOUNTER — OFFICE VISIT (OUTPATIENT)
Dept: MATERNAL FETAL MEDICINE | Facility: CLINIC | Age: 38
End: 2023-10-24
Attending: OBSTETRICS & GYNECOLOGY
Payer: COMMERCIAL

## 2023-10-24 VITALS — SYSTOLIC BLOOD PRESSURE: 99 MMHG | HEART RATE: 86 BPM | RESPIRATION RATE: 16 BRPM | DIASTOLIC BLOOD PRESSURE: 61 MMHG

## 2023-10-24 DIAGNOSIS — O36.5990 FETAL GROWTH RESTRICTION ANTEPARTUM: ICD-10-CM

## 2023-10-24 DIAGNOSIS — O36.5990 FETAL GROWTH RESTRICTION ANTEPARTUM: Primary | ICD-10-CM

## 2023-10-24 PROCEDURE — 76820 UMBILICAL ARTERY ECHO: CPT

## 2023-10-24 PROCEDURE — 76815 OB US LIMITED FETUS(S): CPT | Mod: 26 | Performed by: OBSTETRICS & GYNECOLOGY

## 2023-10-24 PROCEDURE — 76820 UMBILICAL ARTERY ECHO: CPT | Mod: 26 | Performed by: OBSTETRICS & GYNECOLOGY

## 2023-10-24 PROCEDURE — 59025 FETAL NON-STRESS TEST: CPT

## 2023-10-24 PROCEDURE — 59025 FETAL NON-STRESS TEST: CPT | Mod: 26 | Performed by: OBSTETRICS & GYNECOLOGY

## 2023-10-24 NOTE — PROGRESS NOTES
The patient was seen for an ultrasound in the M[facility]etal [facility] Center at the [facility] today.  For a detailed report of the ultrasound examination, please see the ultrasound report which can be found under the imaging tab.    If you have questions regarding today's evaluation or if we can be of further service, please contact the Maternal-Fetal Medicine Center.    Hanh Brooks MD  , OB/GYN  Maternal-Fetal Medicine  [phone] (Pager)

## 2023-10-24 NOTE — NURSING NOTE
Patient presents to [facility] for Limited/UAR at 29w6d due to fetal growth restriction. Positive fetal movement. Denies LOF, vaginal bleeding or cramping/contractions. Patient states that BS levels are largely within normal limits. SBAR given to MFM MD, see their note in Epic. NSTperformed.  reviewed efm tracing. See NST/BPP Doc Flowsheet tab. Continue weekly surveillance with M.

## 2023-10-25 ENCOUNTER — VIRTUAL VISIT (OUTPATIENT)
Dept: ENDOCRINOLOGY | Facility: CLINIC | Age: 38
End: 2023-10-25
Payer: COMMERCIAL

## 2023-10-25 DIAGNOSIS — O24.112 PRE-EXISTING TYPE 2 DIABETES MELLITUS DURING PREGNANCY IN SECOND TRIMESTER: ICD-10-CM

## 2023-10-25 DIAGNOSIS — E11.65 TYPE 2 DIABETES MELLITUS WITH HYPERGLYCEMIA, WITHOUT LONG-TERM CURRENT USE OF INSULIN (H): ICD-10-CM

## 2023-10-25 PROCEDURE — 99213 OFFICE O/P EST LOW 20 MIN: CPT | Mod: VID | Performed by: INTERNAL MEDICINE

## 2023-10-25 NOTE — LETTER
"    10/25/2023         RE: Becky Hernandez  [address]  [place] [address]        Dear Colleague,    Thank you for referring your patient, Becky Hernandez, to the [facility] [place]. Please see a copy of my visit note below.      Video-Visit Details    Type of service:  Video Visit  Video Start Time: 2:34  Video End Time: 2:56  Originating Location (pt. Location): Home, [place]  Distant Location (provider location):  On site, [facility]   Platform used for Video Visit: Daily Pringle MD    Becky Hernandez is a 38 year old year old female here for follow-up of diabetes mellitus in pregnancy via a billable video visit. Last seen by me Aug 2023. Following with CDE    Currently: 30w0d  Estimated Date of Delivery: Tyshawn 3, 2024  Baby: girl       1) Diabetes Mellitus in pregnancy    Diabetes History:  Diagnosis: Pre-diabetes prior to pregnancy,   5 previous pregnancies--   Following 5th pregnancy (2017), noted \"body was different\" -- felt hungry and very tired, requested testing and diagnosed with DM 18-19 weeks post partm  5th pregnancy- positive 1hr screen, neg 3 hr  First pregnancy 5lbs (not preme) preg 2-3 6lbs and 4-5 were 7 and 8 lbs  Started on metformin postpartum, took for a while (approx 1 year) but felt GI upset/diarrhea/fatigue--    Hospitalizations: none  Previous Regimens: metformin   Current Regimen: levemir 44u, Novolog 20-17-20-0, with 5u snacks      INTERVAL HISTORY:  - Doesn't eat many snacks so not using many snack dosing   - notes occasional lows if doesn't snack between meals, so may occasionally eat some fruit  - FGR, measuring at 8th percentile, planning for growth ultrasound next week  - Lows before next meal, usually doesn't treat will just eat next meal, typically >4hr after meal.    Diet:  24hr Recall: 3 meals per day  Minimal snacks, but occasionally on weekends will have extra     Exercise: works Monday-Friday, walks for exercise-  for " "work  Weekends at home- go to [place]         BG check- Dexcom G7  Trends-   Elevated AM fasting-- mostly >100  Spikes with meals, sylvain breakfast and dinner, will come down within a few hours  No lows  Persistent rise early afternoon-- this is when she comes home \"and just sits\"                BP Readings from Last 3 Encounters:   10/24/23 99/61   10/19/23 97/64   10/18/23 94/60       Lab Results   Component Value Date    A1C 6.6 02/11/2019    A1C 7.6 11/30/2018       Wt Readings from Last 3 Encounters:   10/19/23 77 kg (169 lb 12.8 oz)   09/21/23 77 kg (169 lb 12.8 oz)   08/31/23 75.7 kg (166 lb 14.4 oz)       Current Outpatient Medications   Medication Sig Dispense Refill     acetaminophen (TYLENOL) 500 MG tablet Take 500-1,000 mg by mouth every 6 hours as needed for mild pain       acetone urine (KETOSTIX) test strip Use 1 strip/day with first morning urine until ketones are negative for 7 days in a row, then use 1 strip/week. 50 strip 1     Alcohol Swabs PADS Use on skin prior to fingertip glucose testing 3x per day 100 each 11     aspirin 81 MG EC tablet Take 1 tablet (81 mg) by mouth daily 90 tablet 2     benzonatate (TESSALON) 200 MG capsule Take 1 capsule (200 mg) by mouth 3 times daily as needed for cough (Patient not taking: Reported on 9/27/2023) 30 capsule 0     blood glucose (NO BRAND SPECIFIED) test strip Use to test blood sugar 4 times daily or as directed. To accompany: Blood Glucose Monitor Brands: per insurance. 100 strip 6     blood glucose monitoring (NO BRAND SPECIFIED) meter device kit Use to test blood sugar 4 times daily or as directed. Preferred blood glucose meter OR supplies to accompany: Blood Glucose Monitor Brands: per insurance. 1 kit 0     Blood Glucose Monitoring Suppl (ACCU-CHEK GUIDE ME) w/Device KIT TEST BLOOD SUGAR FOUR TIMES DAILY       Continuous Blood Gluc Sensor (DEXCOM G7 SENSOR) MISC 1 each every 10 days Change sensor every 10 days 3 each 5     insulin aspart (NOVOLOG PEN) 100 " UNIT/ML pen Inject 17 Units Subcutaneous daily (with breakfast) AND 17 Units daily (with lunch) AND 20 Units daily (before supper). Plus 5 for snacks. TDD: Up to 65 units 21 mL 3     insulin detemir (LEVEMIR PEN) 100 UNIT/ML pen Inject 44 Units Subcutaneous at bedtime 15 mL 1     insulin pen needle (32G X 4 MM) 32G X 4 MM miscellaneous Use 4 pen needles daily or as directed. 150 each 1     Prenatal Vit-Fe Fumarate-FA (PRENATAL MULTIVITAMIN W/IRON) 27-0.8 MG tablet Take 1 tablet by mouth daily 90 tablet 3     thin (NO BRAND SPECIFIED) lancets Use with lanceting device. To accompany: Blood Glucose Monitor Brands: per insurance. 100 each 6          REVIEW OF SYSTEMS:   ROS: 10 point ROS neg other than the symptoms noted above in the HPI.      EXAM:  Vitals: LMP 03/29/2023 (Exact Date)     BMIE= There is no height or weight on file to calculate BMI.  Physical Exam (visual exam)  VS:  no vital signs taken for video visit  CONSTITUTIONAL: healthy, alert and NAD, responding appropriately  ENT: normocephalic, no visual evidence of trauma, normal nose and oral mucosa  EYES: conjunctivae and sclerae normal, no exophthalmos or proptosis  THYROID:  no visualized nodules or goiter  LUNGS: no audible wheeze, cough or visible cyanosis, no visible retractions or increased work of breathing  EXTREMITIES: no hand tremors  NEUROLOGY: cranial nerves grossly intact with no obvious deficit.  SKIN:  no visualized skin lesions or rash, no edema visualized  PSYCH: mentation appears normal, normal judgement      ASSESSMENT/PLAN:  No diagnosis found.    1) Diabetes Mellitus in pregnancy  - Glucose Control- NOT at goal,   - Continue Levemir 44u nightly-- this may even need to be lowered if we get her evening BG in better place before bed   - INCREASE Novolog 26-22-26-0, with 5u for snacks (has substantial snacks on weekends) These are 30% increases to her doses-- large increase and she is instructed to monitor for hypoglycemia, I feel we have  not been making enough progress towards controlling her mealtime blood sugars and need to try more of an increase to get ahead of this   - Levemir I believe is the bigger culprit for daytime lows, as they are between meals/more in fasting times. These are minimal  - Reviewed blood sugar goals in pregnancy. Glucose goals during pregnancy according to the American Diabetes Association:  Fasting glucose 70-95 mg/dL AND  One-hour postprandial glucose 110-140 mg/dL or  Two-hour postprandial glucose 100-120 mg/dL  - Encouraged at least 30min of activity daily.  - We reviewed risks of uncontrolled hyperglycemia during pregnancy, including but not limited to: gestational hypertension/preeclampsia, increased cesarian section rate, macrosomia, shoulder dystocia, still birth, and long term risk of development of diabetes in grown child    RTC- every 4 weeks until delivery. Follow weekly with diabetes educator.    At next visit (34w) will set delivery plan    A total of 27 minutes were spent today 10/25/23 on this visit including chart review, history and counseling, documentation and other activities as detailed above.         Again, thank you for allowing me to participate in the care of your patient.        Sincerely,        Lindsey Pringle MD

## 2023-10-25 NOTE — PROGRESS NOTES
"  Video-Visit Details    Type of service:  Video Visit  Video Start Time: 2:34  Video End Time: 2:56  Originating Location (pt. Location): Home, [place]  Distant Location (provider location):  On site, [facility]   Platform used for Video Visit: Daily Pringle MD    Becky Hernandez is a 38 year old year old female here for follow-up of diabetes mellitus in pregnancy via a billable video visit. Last seen by me Aug 2023. Following with CDE    Currently: 30w0d  Estimated Date of Delivery: Tyshawn 3, 2024  Baby: girl       1) Diabetes Mellitus in pregnancy    Diabetes History:  Diagnosis: Pre-diabetes prior to pregnancy,   5 previous pregnancies--   Following 5th pregnancy (2017), noted \"body was different\" -- felt hungry and very tired, requested testing and diagnosed with DM 18-19 weeks post partm  5th pregnancy- positive 1hr screen, neg 3 hr  First pregnancy 5lbs (not preme) preg 2-3 6lbs and 4-5 were 7 and 8 lbs  Started on metformin postpartum, took for a while (approx 1 year) but felt GI upset/diarrhea/fatigue--    Hospitalizations: none  Previous Regimens: metformin   Current Regimen: levemir 44u, Novolog 20-17-20-0, with 5u snacks      INTERVAL HISTORY:  - Doesn't eat many snacks so not using many snack dosing   - notes occasional lows if doesn't snack between meals, so may occasionally eat some fruit  - FGR, measuring at 8th percentile, planning for growth ultrasound next week  - Lows before next meal, usually doesn't treat will just eat next meal, typically >4hr after meal.    Diet:  24hr Recall: 3 meals per day  Minimal snacks, but occasionally on weekends will have extra     Exercise: works Monday-Friday, walks for exercise-  for work  Weekends at home- go to [place]         BG check- Dexcom G7  Trends-   Elevated AM fasting-- mostly >100  Spikes with meals, sylvain breakfast and dinner, will come down within a few hours  No lows  Persistent rise early afternoon-- this is when she comes home " "\"and just sits\"                BP Readings from Last 3 Encounters:   10/24/23 99/61   10/19/23 97/64   10/18/23 94/60       Lab Results   Component Value Date    A1C 6.6 02/11/2019    A1C 7.6 11/30/2018       Wt Readings from Last 3 Encounters:   10/19/23 77 kg (169 lb 12.8 oz)   09/21/23 77 kg (169 lb 12.8 oz)   08/31/23 75.7 kg (166 lb 14.4 oz)       Current Outpatient Medications   Medication Sig Dispense Refill    acetaminophen (TYLENOL) 500 MG tablet Take 500-1,000 mg by mouth every 6 hours as needed for mild pain      acetone urine (KETOSTIX) test strip Use 1 strip/day with first morning urine until ketones are negative for 7 days in a row, then use 1 strip/week. 50 strip 1    Alcohol Swabs PADS Use on skin prior to fingertip glucose testing 3x per day 100 each 11    aspirin 81 MG EC tablet Take 1 tablet (81 mg) by mouth daily 90 tablet 2    benzonatate (TESSALON) 200 MG capsule Take 1 capsule (200 mg) by mouth 3 times daily as needed for cough (Patient not taking: Reported on 9/27/2023) 30 capsule 0    blood glucose (NO BRAND SPECIFIED) test strip Use to test blood sugar 4 times daily or as directed. To accompany: Blood Glucose Monitor Brands: per insurance. 100 strip 6    blood glucose monitoring (NO BRAND SPECIFIED) meter device kit Use to test blood sugar 4 times daily or as directed. Preferred blood glucose meter OR supplies to accompany: Blood Glucose Monitor Brands: per insurance. 1 kit 0    Blood Glucose Monitoring Suppl (ACCU-CHEK GUIDE ME) w/Device KIT TEST BLOOD SUGAR FOUR TIMES DAILY      Continuous Blood Gluc Sensor (DEXCOM G7 SENSOR) MISC 1 each every 10 days Change sensor every 10 days 3 each 5    insulin aspart (NOVOLOG PEN) 100 UNIT/ML pen Inject 17 Units Subcutaneous daily (with breakfast) AND 17 Units daily (with lunch) AND 20 Units daily (before supper). Plus 5 for snacks. TDD: Up to 65 units 21 mL 3    insulin detemir (LEVEMIR PEN) 100 UNIT/ML pen Inject 44 Units Subcutaneous at bedtime 15 " mL 1    insulin pen needle (32G X 4 MM) 32G X 4 MM miscellaneous Use 4 pen needles daily or as directed. 150 each 1    Prenatal Vit-Fe Fumarate-FA (PRENATAL MULTIVITAMIN W/IRON) 27-0.8 MG tablet Take 1 tablet by mouth daily 90 tablet 3    thin (NO BRAND SPECIFIED) lancets Use with lanceting device. To accompany: Blood Glucose Monitor Brands: per insurance. 100 each 6          REVIEW OF SYSTEMS:   ROS: 10 point ROS neg other than the symptoms noted above in the HPI.      EXAM:  Vitals: LMP 03/29/2023 (Exact Date)     BMIE= There is no height or weight on file to calculate BMI.  Physical Exam (visual exam)  VS:  no vital signs taken for video visit  CONSTITUTIONAL: healthy, alert and NAD, responding appropriately  ENT: normocephalic, no visual evidence of trauma, normal nose and oral mucosa  EYES: conjunctivae and sclerae normal, no exophthalmos or proptosis  THYROID:  no visualized nodules or goiter  LUNGS: no audible wheeze, cough or visible cyanosis, no visible retractions or increased work of breathing  EXTREMITIES: no hand tremors  NEUROLOGY: cranial nerves grossly intact with no obvious deficit.  SKIN:  no visualized skin lesions or rash, no edema visualized  PSYCH: mentation appears normal, normal judgement      ASSESSMENT/PLAN:  No diagnosis found.    1) Diabetes Mellitus in pregnancy  - Glucose Control- NOT at goal,   - Continue Levemir 44u nightly-- this may even need to be lowered if we get her evening BG in better place before bed   - INCREASE Novolog 26-22-26-0, with 5u for snacks (has substantial snacks on weekends) These are 30% increases to her doses-- large increase and she is instructed to monitor for hypoglycemia, I feel we have not been making enough progress towards controlling her mealtime blood sugars and need to try more of an increase to get ahead of this   - Levemir I believe is the bigger culprit for daytime lows, as they are between meals/more in fasting times. These are minimal  - Reviewed  blood sugar goals in pregnancy. Glucose goals during pregnancy according to the American Diabetes Association:  Fasting glucose 70-95 mg/dL AND  One-hour postprandial glucose 110-140 mg/dL or  Two-hour postprandial glucose 100-120 mg/dL  - Encouraged at least 30min of activity daily.  - We reviewed risks of uncontrolled hyperglycemia during pregnancy, including but not limited to: gestational hypertension/preeclampsia, increased cesarian section rate, macrosomia, shoulder dystocia, still birth, and long term risk of development of diabetes in grown child    RTC- every 4 weeks until delivery. Follow weekly with diabetes educator.    At next visit (34w) will set delivery plan    A total of 27 minutes were spent today 10/25/23 on this visit including chart review, history and counseling, documentation and other activities as detailed above.

## 2023-11-01 ENCOUNTER — HOSPITAL ENCOUNTER (OUTPATIENT)
Dept: ULTRASOUND IMAGING | Facility: CLINIC | Age: 38
Discharge: HOME | End: 2023-11-01
Attending: OBSTETRICS & GYNECOLOGY
Payer: COMMERCIAL

## 2023-11-01 ENCOUNTER — OFFICE VISIT (OUTPATIENT)
Dept: MATERNAL FETAL MEDICINE | Facility: CLINIC | Age: 38
End: 2023-11-01
Attending: OBSTETRICS & GYNECOLOGY
Payer: COMMERCIAL

## 2023-11-01 DIAGNOSIS — O36.5990 FETAL GROWTH RESTRICTION ANTEPARTUM: ICD-10-CM

## 2023-11-01 DIAGNOSIS — O24.113 PRE-EXISTING TYPE 2 DIABETES MELLITUS DURING PREGNANCY IN THIRD TRIMESTER: ICD-10-CM

## 2023-11-01 DIAGNOSIS — O36.5990 FETAL GROWTH RESTRICTION ANTEPARTUM: Primary | ICD-10-CM

## 2023-11-01 PROCEDURE — 76816 OB US FOLLOW-UP PER FETUS: CPT | Mod: 26 | Performed by: OBSTETRICS & GYNECOLOGY

## 2023-11-01 PROCEDURE — 76816 OB US FOLLOW-UP PER FETUS: CPT

## 2023-11-01 NOTE — NURSING NOTE
Patient reports positive fetal movement,  denies contractions, leaking of fluid, or bleeding. Patient following with diabetic ed and adjusting insulin as ordered.  SBAR given to GILBERT MART, see their note in Epic.

## 2023-11-01 NOTE — PROGRESS NOTES
"Please see \"Imaging\" tab under \"Chart Review\" for details of today's US at the [facility].    Eliel Riggs MD  Maternal-Fetal Medicine    "

## 2023-11-02 ENCOUNTER — MYC MEDICAL ADVICE (OUTPATIENT)
Dept: EDUCATION SERVICES | Facility: CLINIC | Age: 38
End: 2023-11-02

## 2023-11-02 DIAGNOSIS — O24.112 PRE-EXISTING TYPE 2 DIABETES MELLITUS DURING PREGNANCY IN SECOND TRIMESTER: ICD-10-CM

## 2023-11-02 DIAGNOSIS — E11.65 TYPE 2 DIABETES MELLITUS WITH HYPERGLYCEMIA, WITHOUT LONG-TERM CURRENT USE OF INSULIN (H): ICD-10-CM

## 2023-11-02 NOTE — TELEPHONE ENCOUNTER
Gestational Diabetes Follow-up    Subjective/Objective:    Rafaelatramainesingh DION Hernandez sent in blood glucose log for review. Last date of communication was: 10/16/23 but saw Dr. Pringle on 10/25, when insulin was increased.    Gestational diabetes is being managed with diet, activity, and medications    Taking diabetes medications: yes:     Diabetes Medication(s)       Insulin       insulin aspart (NOVOLOG PEN) 100 UNIT/ML pen    Inject 26 Units Subcutaneous daily (with breakfast) AND 22 Units daily (with lunch) AND 26 Units daily (before supper). Plus 5 for snacks. TDD: Up to 75 units     insulin detemir (LEVEMIR PEN) 100 UNIT/ML pen    Inject 44 Units Subcutaneous at bedtime          **Patient wrote she is taking 26-22-20-0 units Novolog + 5 unit(s) for snacks. This is lower than Dr. Pringle suggested for dinner of 26 units, can see if typo if looks like more insulin needed for dinner**      Estimated Date of Delivery: Tyshawn 3, 2024    BG/Food Log:           Dexcom Report:                Assessment:  Since insulin increase on 10/25:   Ketones: None available.   Fasting blood glucoses: 88% in target.  After breakfast: 100% in target.  Before lunch: -% in target.  After lunch: 100% in target.  Before dinner: -% in target.  After dinner: 88% in target.    Plan/Response:  Patient meeting time in range now >70% of the time for blood glucose  mg/dL with most recent insulin changes. She is seeing <1% low and very low blood glucose but seeing >25% time above range.     Want to verify when she typically eats meals and if eating more snacks- may need small adjustment if eating snacks. Also will verify insulin dose at dinner since different from PCP's recommendations, and if waking from any hypoglycemia at night- fluctuates so question if sensor compresses or if she is having any trouble.     MyChart reply sent.    Mimi Owens RDN, LD, [facility]     Any diabetes medication dose changes were made via the CDE Protocol and Collaborative  Practice Agreement with the patient's referring provider. A copy of this encounter was shared with the provider.

## 2023-11-02 NOTE — TELEPHONE ENCOUNTER
Patient reports having more issue with hypoglycemia and suspect treating with fruit or small meal without insulin may be causing some of her elevated blood glucose. Will have her try lowering Levemir to 40 units (4 unit(s) or 10% reduction) at bedtime to see if she feels better.    Will check on any eating after dinner. She reports eating between 8-9pm but blood glucose often rising higher after 9pm.  She may benefit from increasing Novolog to 28 unit(s) at dinner if she is not eating a bedtime snack.    Mimi Owens RDN, LD, [facility]

## 2023-11-03 NOTE — TELEPHONE ENCOUNTER
Patient not reach her [employer] message from yesterday. Was able to reach Becky by phone and told her to reduce Levemir to 40 units before bed.     She verified that some dinners are later at night- no snack before bed. Since seeing higher blood glucose after dinner, she is agreeable to trying 28 unit(s) Novolog at dinner.     Requested follow up on Monday. Pt verbalized understanding of concepts discussed and recommendations provided.       Mimi Owens RDN, LD, [facility]

## 2023-11-08 NOTE — TELEPHONE ENCOUNTER
Gestational Diabetes Follow-up    Subjective/Objective:    Becky Hernandez sent in blood glucose log for review. Last date of communication was: 11/2/23.    Gestational diabetes is being managed with diet, activity, and medications    Taking diabetes medications: yes:     Diabetes Medication(s)       Insulin       insulin aspart (NOVOLOG PEN) 100 UNIT/ML pen    Inject 26 Units Subcutaneous daily (with breakfast) AND 22 Units daily (with lunch) AND 28 Units daily (before supper). Plus 5 for snacks. TDD: Up to 75 units     insulin detemir (LEVEMIR PEN) 100 UNIT/ML pen    Inject 40 Units Subcutaneous at bedtime            Estimated Date of Delivery: Tyshawn 3, 2024    BG/Food Log:                           Assessment:  Per pt log BG numbers at goals however CGM showing 3 of the last 5 nights hypoglycemia overnight. Her current TIR is 69%, slightly below goal. Also she is spiking after meals and/or snacks. Called pt to discuss, she reports not taking insulin with snacks and notices her BG spike with fruit. Asked if she could start taking the insulin with snacks and she is agreeable to this. Plan for now will be to reduce Levemir dose, continue current mealtime and snack doses with emphasis on taking insulin at snack times.     Fasting blood glucoses: 100% in target.  After breakfast: 100% in target.  After lunch: 100% in target.  After dinner: 80% in target.    Plan/Response:  Continue current doses of mealtime and snack insulin, make sure to take snack insulin when eating to avoid BG spikes!  Recommend decrease Levemir - 36 units.  Follow-up next week.    Leila Barron RD, WENDY, [facility]      Any diabetes medication dose changes were made via the CDE Protocol and Collaborative Practice Agreement with the patient's OB/GYN provider. A copy of this encounter was shared with the provider.

## 2023-11-09 ENCOUNTER — PRENATAL OFFICE VISIT (OUTPATIENT)
Dept: OBGYN | Facility: CLINIC | Age: 38
End: 2023-11-09
Payer: COMMERCIAL

## 2023-11-09 VITALS
HEART RATE: 86 BPM | OXYGEN SATURATION: 97 % | DIASTOLIC BLOOD PRESSURE: 63 MMHG | WEIGHT: 170.6 LBS | SYSTOLIC BLOOD PRESSURE: 95 MMHG | BODY MASS INDEX: 35.66 KG/M2

## 2023-11-09 DIAGNOSIS — O36.5990 FETAL GROWTH RESTRICTION ANTEPARTUM: Primary | ICD-10-CM

## 2023-11-09 DIAGNOSIS — O09.523 MULTIGRAVIDA OF ADVANCED MATERNAL AGE IN THIRD TRIMESTER: ICD-10-CM

## 2023-11-09 DIAGNOSIS — E11.65 TYPE 2 DIABETES MELLITUS WITH HYPERGLYCEMIA, WITHOUT LONG-TERM CURRENT USE OF INSULIN (H): ICD-10-CM

## 2023-11-09 PROCEDURE — 99207 PR PRENATAL VISIT: CPT | Performed by: OBSTETRICS & GYNECOLOGY

## 2023-11-09 NOTE — PATIENT INSTRUCTIONS
If you have any questions regarding your visit, Please contact your care team.    To Schedule an Appointment 24/7  Call: [phone]  Women s Health  TELEPHONE NUMBER   Ulisses Edwards M.D.    Ale[place]-Medical Assistant    Davian Serra-Surgery Scheduler  [place]ri[place]- Tuesday-[place]                   7:30 a.m.-3:30 p.m.  Wednesday-[place]             8:00 a.m.-4:30 p.m.  Thursday-[place]     8:00 a.m.-4:00 p.m.  Friday-[place]                       7:30 a.m.-1:00 p.m. [facility]  [address] [place] [address].  [place] [address] [phone] Phone  [phone] Fax    Imaging Scheduling all locations  [phone]      [facility]  [address]  [place], [place] [address] [phone]    [facility]  [address] [place] [address] [phone] ask for Women's Clinic     **Surgeries** Our Surgery Schedulers will contact you to schedule. If you do not receive a call within 3 business days, please call [phone].    Urgent Care locations:  [facility] Monday-Friday                 10 am - 8 pm  Saturday and Sunday        9 am - 5 pm   [phone]   If you need a medication refill, please contact your pharmacy. Please allow 3 business days for your refill to be completed.  As always, Thank you for trusting us with your healthcare needs!  If you have any questions regarding your visit, Please contact your care team.    [employer]: [phone]    To Schedule an Appointment 24/7  Call: [phone]    see additional instructions from your care team below

## 2023-11-10 NOTE — PROGRESS NOTES
32w1d.   BP 95/63 (BP Location: Left arm, Patient Position: Sitting, Cuff Size: Adult Regular)   Pulse 86   Wt 77.4 kg (170 lb 9.6 oz)   LMP 03/29/2023 (Exact Date)   SpO2 97%   BMI 35.66 kg/m    Tired.  No HA, visual changes, N/V   Good FM, No ROM, No vaginal bleeding  Review of MFM notes performed.  With the FGR,, Type 2 DM, the growth ultrasounds will be changed to every 3 weeks with MFM and the twice weekly BPP with UA dopplers ordered.    RTC 2 weeks  Ulisses Edwards MD

## 2023-11-16 ENCOUNTER — ANCILLARY PROCEDURE (OUTPATIENT)
Dept: ULTRASOUND IMAGING | Facility: CLINIC | Age: 38
End: 2023-11-16
Attending: OBSTETRICS & GYNECOLOGY
Payer: COMMERCIAL

## 2023-11-16 DIAGNOSIS — O09.523 MULTIGRAVIDA OF ADVANCED MATERNAL AGE IN THIRD TRIMESTER: ICD-10-CM

## 2023-11-16 DIAGNOSIS — O36.5990 FETAL GROWTH RESTRICTION ANTEPARTUM: ICD-10-CM

## 2023-11-16 DIAGNOSIS — E11.65 TYPE 2 DIABETES MELLITUS WITH HYPERGLYCEMIA, WITHOUT LONG-TERM CURRENT USE OF INSULIN (H): ICD-10-CM

## 2023-11-16 PROCEDURE — 76819 FETAL BIOPHYS PROFIL W/O NST: CPT | Mod: TC | Performed by: RADIOLOGY

## 2023-11-20 ENCOUNTER — MYC MEDICAL ADVICE (OUTPATIENT)
Dept: EDUCATION SERVICES | Facility: CLINIC | Age: 38
End: 2023-11-20

## 2023-11-20 DIAGNOSIS — O24.112 PRE-EXISTING TYPE 2 DIABETES MELLITUS DURING PREGNANCY IN SECOND TRIMESTER: ICD-10-CM

## 2023-11-20 DIAGNOSIS — E11.65 TYPE 2 DIABETES MELLITUS WITH HYPERGLYCEMIA, WITHOUT LONG-TERM CURRENT USE OF INSULIN (H): ICD-10-CM

## 2023-11-20 NOTE — TELEPHONE ENCOUNTER
Gestational Diabetes Follow-up    Subjective/Objective:    Becky Hernandez sent in blood glucose log for review. Last date of communication was: 11/8.    Gestational diabetes is being managed with diet, activity, and medications    Taking diabetes medications: yes:     Diabetes Medication(s)       Insulin       insulin aspart (NOVOLOG PEN) 100 UNIT/ML pen    Inject 26 Units Subcutaneous daily (with breakfast) AND 22 Units daily (with lunch) AND 28 Units daily (before supper). Plus 5 for snacks. TDD: Up to 75 units     insulin detemir (LEVEMIR PEN) 100 UNIT/ML pen    Inject 36 Units Subcutaneous at bedtime            Estimated Date of Delivery: Tyshawn 3, 2024    BG/Food Log:                       Assessment:  TIR per CGM is 65%, was at 69% last week. Fingerpokes data showing BG in ranges however CGM showing spikes >140. Called Becky to discuss BG but no answer, so sent [employer] message with questions to better understand BG numbers. She reports not missing insulin doses and having spikes after meals/snacks. Will adjust insulin slightly with goals of getting TIR to 70%.     Ketones: na  Fasting blood glucoses: 80% in target.  After breakfast: 100% in target.  After lunch: 100% in target.  After dinner: 89% in target.    Plan/Response:  -Increase mealtime doses as follows:  26-22-28-0 --> 28-23-30-0  -Increase snack insulin dose  5u-->6u  -Follow up next week or sooner if 3 or more BG above goals, or unexplained low blood sugar.    Leila Barron RD, WENDY, [facility]      Any diabetes medication dose changes were made via the CDE Protocol and Collaborative Practice Agreement with the patient's OB/GYN provider. A copy of this encounter was shared with the provider.

## 2023-11-21 ENCOUNTER — PRENATAL OFFICE VISIT (OUTPATIENT)
Dept: OBGYN | Facility: CLINIC | Age: 38
End: 2023-11-21
Payer: COMMERCIAL

## 2023-11-21 VITALS
DIASTOLIC BLOOD PRESSURE: 68 MMHG | SYSTOLIC BLOOD PRESSURE: 105 MMHG | BODY MASS INDEX: 35.7 KG/M2 | WEIGHT: 170.8 LBS | OXYGEN SATURATION: 98 % | HEART RATE: 65 BPM

## 2023-11-21 DIAGNOSIS — O09.523 MULTIGRAVIDA OF ADVANCED MATERNAL AGE IN THIRD TRIMESTER: ICD-10-CM

## 2023-11-21 DIAGNOSIS — E11.65 TYPE 2 DIABETES MELLITUS WITH HYPERGLYCEMIA, UNSPECIFIED WHETHER LONG TERM INSULIN USE (H): Primary | ICD-10-CM

## 2023-11-21 DIAGNOSIS — O36.5990 FETAL GROWTH RESTRICTION ANTEPARTUM: ICD-10-CM

## 2023-11-21 PROCEDURE — 99207 PR PRENATAL VISIT: CPT | Performed by: OBSTETRICS & GYNECOLOGY

## 2023-11-21 NOTE — PROGRESS NOTES
33w6d    /68 (BP Location: Right arm, Cuff Size: Adult Regular)   Pulse 65   Wt 77.5 kg (170 lb 12.8 oz)   LMP 03/29/2023 (Exact Date)   SpO2 98%   BMI 35.70 kg/m    Tired.  No HA, visual changes, N/V    She has discussed glucoses with the diabetic team.  She has adjusted her insulin requirements.    She is interested in the tubal ligation.  She is aware it is permanent and not reversible.  There is a small failure rate.  With the fimbriated end removed, there is substantial decrease in the failure rate.  Copy of tubal papers given to her.   She has the BPP in 3 days.  The one yesterday was cancelled.   She has MFM visit next week.   RTC 1 wk  Ulisses Edwards MD

## 2023-11-21 NOTE — NURSING NOTE
Patient brought in FMLA forms 2 different ones.  I filled theses out and Dr. Edwards signed and were given to patient.  TERE Sales 11/21/2023

## 2023-11-24 ENCOUNTER — ANCILLARY PROCEDURE (OUTPATIENT)
Dept: ULTRASOUND IMAGING | Facility: CLINIC | Age: 38
End: 2023-11-24
Attending: OBSTETRICS & GYNECOLOGY
Payer: COMMERCIAL

## 2023-11-24 LAB — RADIOLOGIST FLAGS: NORMAL

## 2023-11-24 PROCEDURE — 76819 FETAL BIOPHYS PROFIL W/O NST: CPT | Mod: TC | Performed by: INTERNAL MEDICINE

## 2023-11-27 ENCOUNTER — OFFICE VISIT (OUTPATIENT)
Dept: MATERNAL FETAL MEDICINE | Facility: CLINIC | Age: 38
End: 2023-11-27
Attending: OBSTETRICS & GYNECOLOGY
Payer: COMMERCIAL

## 2023-11-27 ENCOUNTER — HOSPITAL ENCOUNTER (OUTPATIENT)
Dept: ULTRASOUND IMAGING | Facility: CLINIC | Age: 38
Discharge: HOME | End: 2023-11-27
Attending: OBSTETRICS & GYNECOLOGY
Payer: COMMERCIAL

## 2023-11-27 ENCOUNTER — TELEPHONE (OUTPATIENT)
Dept: OBGYN | Facility: CLINIC | Age: 38
End: 2023-11-27

## 2023-11-27 DIAGNOSIS — O24.113 PRE-EXISTING TYPE 2 DIABETES MELLITUS DURING PREGNANCY IN THIRD TRIMESTER: ICD-10-CM

## 2023-11-27 DIAGNOSIS — O24.113 PRE-EXISTING TYPE 2 DIABETES MELLITUS DURING PREGNANCY IN THIRD TRIMESTER: Primary | ICD-10-CM

## 2023-11-27 PROCEDURE — 76819 FETAL BIOPHYS PROFIL W/O NST: CPT | Mod: 26 | Performed by: OBSTETRICS & GYNECOLOGY

## 2023-11-27 PROCEDURE — 76819 FETAL BIOPHYS PROFIL W/O NST: CPT

## 2023-11-27 PROCEDURE — 76816 OB US FOLLOW-UP PER FETUS: CPT | Mod: 26 | Performed by: OBSTETRICS & GYNECOLOGY

## 2023-11-27 PROCEDURE — 76816 OB US FOLLOW-UP PER FETUS: CPT

## 2023-11-27 NOTE — TELEPHONE ENCOUNTER
"Pt last seen 11/21/2023 for prenatal, currently 34w5d.    BPP on 11/24/2023: \"IMPRESSION:   1.  Biophysical profile score is 8 out of 8.  2.  Possible dilated vessel versus small amount of retroplacental  fluid. Recommend attention on follow-up.     [Consider Follow Up: Possible dilated vessel versus small amount of retroplacental fluid.]\"    RN routing to provider to advise on follow up/acknowledgement of result.    Yuridia Schulz RN on 11/27/2023 at 11:21 AM    "

## 2023-11-27 NOTE — CONFIDENTIAL NOTE
[facility] Call Center    Phone Message    May a detailed message be left on voicemail: yes     Reason for Call: Iban needs incidental finding acknowledge - from ultrasound on 11/24    Action Taken: Message routed to:  Other: [facility]    Travel Screening: Not Applicable

## 2023-11-27 NOTE — PROGRESS NOTES
"Please see \"Imaging\" tab under \"Chart Review\" for details of today's US at the [facility].    Eliel Riggs MD  Maternal-Fetal Medicine    "

## 2023-11-27 NOTE — NURSING NOTE
Patient reports states fetal movement. Reports blood sugar values fasting and 2 hr post prandial wnl. Education provided to patient on today's ultrasound including BPP and kick counts.  SBAR given to GILBERT MART, see their note in Epic.

## 2023-11-29 ENCOUNTER — VIRTUAL VISIT (OUTPATIENT)
Dept: ENDOCRINOLOGY | Facility: CLINIC | Age: 38
End: 2023-11-29
Payer: COMMERCIAL

## 2023-11-29 DIAGNOSIS — O24.112 PRE-EXISTING TYPE 2 DIABETES MELLITUS DURING PREGNANCY IN SECOND TRIMESTER: Primary | ICD-10-CM

## 2023-11-29 DIAGNOSIS — E11.65 TYPE 2 DIABETES MELLITUS WITH HYPERGLYCEMIA, WITHOUT LONG-TERM CURRENT USE OF INSULIN (H): ICD-10-CM

## 2023-11-29 PROCEDURE — 99213 OFFICE O/P EST LOW 20 MIN: CPT | Mod: VID | Performed by: INTERNAL MEDICINE

## 2023-11-29 NOTE — NURSING NOTE
Chief Complaint   Patient presents with    RECHECK     Pre-existing type 2 diabetes mellitus during pregnancy in second trimester +1 more       There were no vitals filed for this visit.    There is no height or weight on file to calculate BMI.      Raoul Rothman, ICVF

## 2023-11-29 NOTE — LETTER
"    11/29/2023         RE: Becky Hernandez  [address]  [place] [address]        Dear Colleague,    Thank you for referring your patient, Becky Hernandez, to the [facility] [place]. Please see a copy of my visit note below.      Video-Visit Details    Type of service:  Video Visit  Video Start Time: 2:45  Video End Time: 3:08  Originating Location (pt. Location): Home, [place]  Distant Location (provider location):  On site, [facility]   Platform used for Video Visit: Daily Pringle MD    Becky Hernandez is a 38 year old year old female here for follow-up of diabetes mellitus in pregnancy via a billable video visit. Last seen by me Oct 2023. Following with CDE    Currently: 35w0d  Estimated Date of Delivery: Tyshawn 3, 2024  Baby: girl       1) Diabetes Mellitus in pregnancy    Diabetes History:  Diagnosis: Pre-diabetes prior to pregnancy,   5 previous pregnancies--   Following 5th pregnancy (2017), noted \"body was different\" -- felt hungry and very tired, requested testing and diagnosed with DM 18-19 weeks post partm  5th pregnancy- positive 1hr screen, neg 3 hr  First pregnancy 5lbs (not preme) preg 2-3 6lbs and 4-5 were 7 and 8 lbs  Started on metformin postpartum, took for a while (approx 1 year) but felt GI upset/diarrhea/fatigue--    Hospitalizations: none  Previous Regimens: metformin remote, not anything before pregnancy   Current Regimen: levemir 36u, Novolog 30-23-30-0, with 6u snacks      INTERVAL HISTORY:  - Some nausea with higher novolog doses, so reduced   - Doesn't eat many snacks so not using many snack dosing   - notes occasional lows if doesn't snack between meals, so may occasionally eat some fruit  - previous FGR, measuring at 8th percentile, planning for growth ultrasound next week-- current weight [id number], est 30th percentile, on track now  - Recently many family gatherings (Thanksgiving)      Diet:  24hr Recall: 3 meals per day  Minimal snacks, but occasionally " "on weekends will have extra     Exercise: works Monday-Friday, walks for exercise-  for work  Weekends at home- go to [place]     BG check- Dexcom G7  Trends-   Elevated AM fasting-- mostly >100  Spikes with meals, sylvain breakfast and dinner, will come down within a few hours  No lows  Persistent rise early afternoon-- this is when she comes home \"and just sits\"                  BP Readings from Last 3 Encounters:   11/21/23 105/68   11/09/23 95/63   10/24/23 99/61       Lab Results   Component Value Date    A1C 6.6 02/11/2019    A1C 7.6 11/30/2018       Wt Readings from Last 3 Encounters:   11/21/23 77.5 kg (170 lb 12.8 oz)   11/09/23 77.4 kg (170 lb 9.6 oz)   10/19/23 77 kg (169 lb 12.8 oz)       Current Outpatient Medications   Medication Sig Dispense Refill     acetaminophen (TYLENOL) 500 MG tablet Take 500-1,000 mg by mouth every 6 hours as needed for mild pain       acetone urine (KETOSTIX) test strip Use 1 strip/day with first morning urine until ketones are negative for 7 days in a row, then use 1 strip/week. 50 strip 1     aspirin 81 MG EC tablet Take 1 tablet (81 mg) by mouth daily 90 tablet 2     blood glucose (NO BRAND SPECIFIED) test strip Use to test blood sugar 4 times daily or as directed. To accompany: Blood Glucose Monitor Brands: per insurance. 100 strip 6     blood glucose monitoring (NO BRAND SPECIFIED) meter device kit Use to test blood sugar 4 times daily or as directed. Preferred blood glucose meter OR supplies to accompany: Blood Glucose Monitor Brands: per insurance. 1 kit 0     Blood Glucose Monitoring Suppl (ACCU-CHEK GUIDE ME) w/Device KIT TEST BLOOD SUGAR FOUR TIMES DAILY       Continuous Blood Gluc Sensor (DEXCOM G7 SENSOR) MISC 1 each every 10 days Change sensor every 10 days 3 each 5     insulin aspart (NOVOLOG PEN) 100 UNIT/ML pen Inject 28 Units Subcutaneous daily (with breakfast) AND 23 Units daily (with lunch) AND 30 Units daily (before supper). Plus 6 for snacks. " TDD: Up to 115 units 30 mL 3     insulin detemir (LEVEMIR PEN) 100 UNIT/ML pen Inject 36 Units Subcutaneous at bedtime 15 mL 1     insulin pen needle (32G X 4 MM) 32G X 4 MM miscellaneous Use 4 pen needles daily or as directed. 150 each 1     Prenatal Vit-Fe Fumarate-FA (PRENATAL MULTIVITAMIN W/IRON) 27-0.8 MG tablet Take 1 tablet by mouth daily 90 tablet 3     thin (NO BRAND SPECIFIED) lancets Use with lanceting device. To accompany: Blood Glucose Monitor Brands: per insurance. 100 each 6     Alcohol Swabs PADS Use on skin prior to fingertip glucose testing 3x per day 100 each 11     benzonatate (TESSALON) 200 MG capsule Take 1 capsule (200 mg) by mouth 3 times daily as needed for cough (Patient not taking: Reported on 9/27/2023) 30 capsule 0          REVIEW OF SYSTEMS:   ROS: 10 point ROS neg other than the symptoms noted above in the HPI.      EXAM:  Vitals: LMP 03/29/2023 (Exact Date)     BMIE= There is no height or weight on file to calculate BMI.  Physical Exam (visual exam)  VS:  no vital signs taken for video visit  CONSTITUTIONAL: healthy, alert and NAD, responding appropriately  ENT: normocephalic, no visual evidence of trauma, normal nose and oral mucosa  EYES: conjunctivae and sclerae normal, no exophthalmos or proptosis  THYROID:  no visualized nodules or goiter  LUNGS: no audible wheeze, cough or visible cyanosis, no visible retractions or increased work of breathing  EXTREMITIES: no hand tremors  NEUROLOGY: cranial nerves grossly intact with no obvious deficit.  SKIN:  no visualized skin lesions or rash, no edema visualized  PSYCH: mentation appears normal, normal judgement      ASSESSMENT/PLAN:  No diagnosis found.    1) Diabetes Mellitus in pregnancy  - Glucose Control- NOT at goal,   - INCREASE levemir 36u >40    - INCREASE Novolog 30-23-30-0> 33-25-33, with 6u for snacks (has substantial snacks on weekends) These are 10% increases to her doses-  - Reviewed blood sugar goals in pregnancy. Glucose  goals during pregnancy according to the American Diabetes Association:  Fasting glucose 70-95 mg/dL AND  One-hour postprandial glucose 110-140 mg/dL or  Two-hour postprandial glucose 100-120 mg/dL  - Encouraged at least 30min of activity daily.  - We reviewed risks of uncontrolled hyperglycemia during pregnancy, including but not limited to: gestational hypertension/preeclampsia, increased cesarian section rate, macrosomia, shoulder dystocia, still birth, and long term risk of development of diabetes in grown child      Delivery Plan  If scheduled for delivery and told to not eat after midnight, take 75% of levemir dose (at current 40u nightly this would be dose of 30). No novolog when not eating    After delivery, stop all insulins.  Continue checking blood sugars once daily at rotating times. Or with Dexcom  12-16 weeks after delivery check A1C    I will see you back after that test is complete to interpret the results and discuss next steps.   Please let me know if you have any questions in the meantime or you see elevations in your daily blood sugar checks.     Glucose goals according to the American Diabetes Association (non-pregnancy):  --Pre-meal (including fasting, before meals):  mg/dl  --2 hours after eating: <180 mg/dl, ideally 100-150 mg/dl    RTC- PRN Follow weekly with diabetes educator.      A total of 22 minutes were spent today 11/29/23 on this visit including chart review, history and counseling, documentation and other activities as detailed above.         Again, thank you for allowing me to participate in the care of your patient.        Sincerely,        Lindsey Pringle MD

## 2023-11-29 NOTE — PATIENT INSTRUCTIONS
If scheduled for delivery and told to not eat after midnight, take 75% of levemir dose (at current 40u nightly this would be dose of 30) No novolog when not eating    After delivery, stop all insulins.  Continue checking blood sugars once daily at rotating times or with dexcom  12-16 weeks after delivery check A1C. Order is in, you need to schedule lab appointment for this time period    I will see you back after that test is complete to interpret the results and discuss next steps.   Please let me know if you have any questions in the meantime or you see elevations in your daily blood sugar checks.     Glucose goals according to the American Diabetes Association (non-pregnancy):  --Pre-meal (including fasting, before meals):  mg/dl  --2 hours after eating: <180 mg/dl, ideally 100-150 mg/dl

## 2023-11-29 NOTE — PROGRESS NOTES
"  Video-Visit Details    Type of service:  Video Visit  Video Start Time: 2:45  Video End Time: 3:08  Originating Location (pt. Location): Home, [place]  Distant Location (provider location):  On site, [facility]   Platform used for Video Visit: Daily Pringle MD    Becky Hernandez is a 38 year old year old female here for follow-up of diabetes mellitus in pregnancy via a billable video visit. Last seen by me Oct 2023. Following with CDE    Currently: 35w0d  Estimated Date of Delivery: Tyshanw 3, 2024  Baby: girl       1) Diabetes Mellitus in pregnancy    Diabetes History:  Diagnosis: Pre-diabetes prior to pregnancy,   5 previous pregnancies--   Following 5th pregnancy (2017), noted \"body was different\" -- felt hungry and very tired, requested testing and diagnosed with DM 18-19 weeks post partm  5th pregnancy- positive 1hr screen, neg 3 hr  First pregnancy 5lbs (not preme) preg 2-3 6lbs and 4-5 were 7 and 8 lbs  Started on metformin postpartum, took for a while (approx 1 year) but felt GI upset/diarrhea/fatigue--    Hospitalizations: none  Previous Regimens: metformin remote, not anything before pregnancy   Current Regimen: levemir 36u, Novolog 30-23-30-0, with 6u snacks      INTERVAL HISTORY:  - Some nausea with higher novolog doses, so reduced   - Doesn't eat many snacks so not using many snack dosing   - notes occasional lows if doesn't snack between meals, so may occasionally eat some fruit  - previous FGR, measuring at 8th percentile, planning for growth ultrasound next week-- current weight [id number], est 30th percentile, on track now  - Recently many family gatherings (Thanksgiving)      Diet:  24hr Recall: 3 meals per day  Minimal snacks, but occasionally on weekends will have extra     Exercise: works Monday-Friday, walks for exercise-  for work  Weekends at home- go to [place]     BG check- Dexcom G7  Trends-   Elevated AM fasting-- mostly >100  Spikes with meals, sylvain breakfast and dinner, " "will come down within a few hours  No lows  Persistent rise early afternoon-- this is when she comes home \"and just sits\"                  BP Readings from Last 3 Encounters:   11/21/23 105/68   11/09/23 95/63   10/24/23 99/61       Lab Results   Component Value Date    A1C 6.6 02/11/2019    A1C 7.6 11/30/2018       Wt Readings from Last 3 Encounters:   11/21/23 77.5 kg (170 lb 12.8 oz)   11/09/23 77.4 kg (170 lb 9.6 oz)   10/19/23 77 kg (169 lb 12.8 oz)       Current Outpatient Medications   Medication Sig Dispense Refill    acetaminophen (TYLENOL) 500 MG tablet Take 500-1,000 mg by mouth every 6 hours as needed for mild pain      acetone urine (KETOSTIX) test strip Use 1 strip/day with first morning urine until ketones are negative for 7 days in a row, then use 1 strip/week. 50 strip 1    aspirin 81 MG EC tablet Take 1 tablet (81 mg) by mouth daily 90 tablet 2    blood glucose (NO BRAND SPECIFIED) test strip Use to test blood sugar 4 times daily or as directed. To accompany: Blood Glucose Monitor Brands: per insurance. 100 strip 6    blood glucose monitoring (NO BRAND SPECIFIED) meter device kit Use to test blood sugar 4 times daily or as directed. Preferred blood glucose meter OR supplies to accompany: Blood Glucose Monitor Brands: per insurance. 1 kit 0    Blood Glucose Monitoring Suppl (ACCU-CHEK GUIDE ME) w/Device KIT TEST BLOOD SUGAR FOUR TIMES DAILY      Continuous Blood Gluc Sensor (DEXCOM G7 SENSOR) MISC 1 each every 10 days Change sensor every 10 days 3 each 5    insulin aspart (NOVOLOG PEN) 100 UNIT/ML pen Inject 28 Units Subcutaneous daily (with breakfast) AND 23 Units daily (with lunch) AND 30 Units daily (before supper). Plus 6 for snacks. TDD: Up to 115 units 30 mL 3    insulin detemir (LEVEMIR PEN) 100 UNIT/ML pen Inject 36 Units Subcutaneous at bedtime 15 mL 1    insulin pen needle (32G X 4 MM) 32G X 4 MM miscellaneous Use 4 pen needles daily or as directed. 150 each 1    Prenatal Vit-Fe " Fumarate-FA (PRENATAL MULTIVITAMIN W/IRON) 27-0.8 MG tablet Take 1 tablet by mouth daily 90 tablet 3    thin (NO BRAND SPECIFIED) lancets Use with lanceting device. To accompany: Blood Glucose Monitor Brands: per insurance. 100 each 6    Alcohol Swabs PADS Use on skin prior to fingertip glucose testing 3x per day 100 each 11    benzonatate (TESSALON) 200 MG capsule Take 1 capsule (200 mg) by mouth 3 times daily as needed for cough (Patient not taking: Reported on 9/27/2023) 30 capsule 0          REVIEW OF SYSTEMS:   ROS: 10 point ROS neg other than the symptoms noted above in the HPI.      EXAM:  Vitals: LMP 03/29/2023 (Exact Date)     BMIE= There is no height or weight on file to calculate BMI.  Physical Exam (visual exam)  VS:  no vital signs taken for video visit  CONSTITUTIONAL: healthy, alert and NAD, responding appropriately  ENT: normocephalic, no visual evidence of trauma, normal nose and oral mucosa  EYES: conjunctivae and sclerae normal, no exophthalmos or proptosis  THYROID:  no visualized nodules or goiter  LUNGS: no audible wheeze, cough or visible cyanosis, no visible retractions or increased work of breathing  EXTREMITIES: no hand tremors  NEUROLOGY: cranial nerves grossly intact with no obvious deficit.  SKIN:  no visualized skin lesions or rash, no edema visualized  PSYCH: mentation appears normal, normal judgement      ASSESSMENT/PLAN:  No diagnosis found.    1) Diabetes Mellitus in pregnancy  - Glucose Control- NOT at goal,   - INCREASE levemir 36u >40    - INCREASE Novolog 30-23-30-0> 33-25-33, with 6u for snacks (has substantial snacks on weekends) These are 10% increases to her doses-  - Reviewed blood sugar goals in pregnancy. Glucose goals during pregnancy according to the American Diabetes Association:  Fasting glucose 70-95 mg/dL AND  One-hour postprandial glucose 110-140 mg/dL or  Two-hour postprandial glucose 100-120 mg/dL  - Encouraged at least 30min of activity daily.  - We reviewed  risks of uncontrolled hyperglycemia during pregnancy, including but not limited to: gestational hypertension/preeclampsia, increased cesarian section rate, macrosomia, shoulder dystocia, still birth, and long term risk of development of diabetes in grown child      Delivery Plan  If scheduled for delivery and told to not eat after midnight, take 75% of levemir dose (at current 40u nightly this would be dose of 30). No novolog when not eating    After delivery, stop all insulins.  Continue checking blood sugars once daily at rotating times. Or with Dexcom  12-16 weeks after delivery check A1C    I will see you back after that test is complete to interpret the results and discuss next steps.   Please let me know if you have any questions in the meantime or you see elevations in your daily blood sugar checks.     Glucose goals according to the American Diabetes Association (non-pregnancy):  --Pre-meal (including fasting, before meals):  mg/dl  --2 hours after eating: <180 mg/dl, ideally 100-150 mg/dl    RTC- PRN Follow weekly with diabetes educator.      A total of 22 minutes were spent today 11/29/23 on this visit including chart review, history and counseling, documentation and other activities as detailed above.

## 2023-11-30 ENCOUNTER — ANCILLARY PROCEDURE (OUTPATIENT)
Dept: ULTRASOUND IMAGING | Facility: CLINIC | Age: 38
End: 2023-11-30
Attending: OBSTETRICS & GYNECOLOGY
Payer: COMMERCIAL

## 2023-11-30 PROCEDURE — 76819 FETAL BIOPHYS PROFIL W/O NST: CPT | Mod: TC | Performed by: RADIOLOGY

## 2023-11-30 NOTE — TELEPHONE ENCOUNTER
BPP result reviewed.   She has also had an [facility] visit with BPP 2 days ago, and no concerning findings are noted with their scan.   She has repeat BPP tomorrow.   Ulisses Edwards MD

## 2023-12-01 ENCOUNTER — PRENATAL OFFICE VISIT (OUTPATIENT)
Dept: OBGYN | Facility: CLINIC | Age: 38
End: 2023-12-01
Payer: COMMERCIAL

## 2023-12-01 VITALS
OXYGEN SATURATION: 98 % | DIASTOLIC BLOOD PRESSURE: 65 MMHG | HEART RATE: 82 BPM | BODY MASS INDEX: 35.78 KG/M2 | SYSTOLIC BLOOD PRESSURE: 97 MMHG | WEIGHT: 171.2 LBS

## 2023-12-01 DIAGNOSIS — E11.65 TYPE 2 DIABETES MELLITUS WITH HYPERGLYCEMIA, WITHOUT LONG-TERM CURRENT USE OF INSULIN (H): Primary | ICD-10-CM

## 2023-12-01 DIAGNOSIS — O36.5990 FETAL GROWTH RESTRICTION ANTEPARTUM: ICD-10-CM

## 2023-12-01 DIAGNOSIS — O09.523 MULTIGRAVIDA OF ADVANCED MATERNAL AGE IN THIRD TRIMESTER: ICD-10-CM

## 2023-12-01 PROCEDURE — 99207 PR PRENATAL VISIT: CPT | Performed by: OBSTETRICS & GYNECOLOGY

## 2023-12-01 ASSESSMENT — PATIENT HEALTH QUESTIONNAIRE - PHQ9: SUM OF ALL RESPONSES TO PHQ QUESTIONS 1-9: 3

## 2023-12-01 NOTE — PROGRESS NOTES
35w2d    BP 97/65 (BP Location: Right arm, Cuff Size: Adult Regular)   Pulse 82   Wt 77.7 kg (171 lb 3.2 oz)   LMP 03/29/2023 (Exact Date)   SpO2 98%   BMI 35.78 kg/m    Tired.  No HA, visual changes, N/V etc. PNE classes planned/done.   She has had a small increase in the insulin dosing.  Previous S/D ratio 2.3.  Now 2.5 to 2.8  RTC 1 wk

## 2023-12-04 ENCOUNTER — NURSE TRIAGE (OUTPATIENT)
Dept: NURSING | Facility: CLINIC | Age: 38
End: 2023-12-04

## 2023-12-04 ENCOUNTER — ANCILLARY PROCEDURE (OUTPATIENT)
Dept: ULTRASOUND IMAGING | Facility: CLINIC | Age: 38
End: 2023-12-04
Attending: OBSTETRICS & GYNECOLOGY
Payer: COMMERCIAL

## 2023-12-04 PROCEDURE — 76819 FETAL BIOPHYS PROFIL W/O NST: CPT | Mod: TC | Performed by: STUDENT IN AN ORGANIZED HEALTH CARE EDUCATION/TRAINING PROGRAM

## 2023-12-05 ENCOUNTER — PRENATAL OFFICE VISIT (OUTPATIENT)
Dept: OBGYN | Facility: CLINIC | Age: 38
End: 2023-12-05
Payer: COMMERCIAL

## 2023-12-05 VITALS
DIASTOLIC BLOOD PRESSURE: 66 MMHG | HEART RATE: 81 BPM | BODY MASS INDEX: 35.99 KG/M2 | SYSTOLIC BLOOD PRESSURE: 99 MMHG | OXYGEN SATURATION: 96 % | WEIGHT: 172.2 LBS

## 2023-12-05 DIAGNOSIS — Z36.85 ENCOUNTER FOR ANTENATAL SCREENING FOR STREPTOCOCCUS B: ICD-10-CM

## 2023-12-05 DIAGNOSIS — E11.65 TYPE 2 DIABETES MELLITUS WITH HYPERGLYCEMIA, WITHOUT LONG-TERM CURRENT USE OF INSULIN (H): Primary | ICD-10-CM

## 2023-12-05 DIAGNOSIS — O36.5990 FETAL GROWTH RESTRICTION ANTEPARTUM: ICD-10-CM

## 2023-12-05 PROCEDURE — 87653 STREP B DNA AMP PROBE: CPT | Performed by: OBSTETRICS & GYNECOLOGY

## 2023-12-05 PROCEDURE — 99207 PR PRENATAL VISIT: CPT | Performed by: OBSTETRICS & GYNECOLOGY

## 2023-12-05 NOTE — PROGRESS NOTES
35w6d    BP 99/66 (BP Location: Right arm, Cuff Size: Adult Regular)   Pulse 81   Wt 78.1 kg (172 lb 3.2 oz)   LMP 03/29/2023 (Exact Date)   SpO2 96%   BMI 35.99 kg/m    Tired.  No HA, visual changes, N/V   Good FM, No ROM, No vaginal bleeding   Beta strep testing today.   Induction discussed.  Will  plan for 39 weeks, unless M recommends otherwise.   RTC 1 week  Ulisses Edwards MD

## 2023-12-05 NOTE — TELEPHONE ENCOUNTER
Received call from Paula Symwave with [facility]. She states that patient failed her biophysical profile. Received 6/8, did not pass fetal movement. She states she needs someone from OBGYN team to give patient instructions regarding this failed test. Patient is with her. Connected her to [facility] answering service to have OBGYN on-call paged to her.     Reason for Disposition   General information question, no triage required and triager able to answer question    Protocols used: Information Only Call - No Triage-A-

## 2023-12-06 ENCOUNTER — TELEPHONE (OUTPATIENT)
Dept: OBGYN | Facility: CLINIC | Age: 38
End: 2023-12-06
Payer: COMMERCIAL

## 2023-12-06 LAB — GP B STREP DNA SPEC QL NAA+PROBE: NEGATIVE

## 2023-12-06 NOTE — TELEPHONE ENCOUNTER
[facility] contacted us with an abnormal BPP of 6 out 8.  This BPP was done yesterday, 12/5, at 6:40 pm.    Called on call, Dr. Peña.  Dr. Peña states pt can either go to [facility] today for an NST or she can be seen later this week in office for an NST.  She states this is not urgent and can just be seen in office this week if she doesn't want to go to L&D.    Pt does have another BPP tomorrow, 12/7, in [place].    Pt would prefer to do the NST in clinic.  However, there are no openings in clinic this week for an NST.      Will route to Dr. Edwards and provider pool as he is not in clinic this afternoon or tomorrow, to see if anyone is able to work pt into their schedule this week for an NST due to BPP last night of 6 out of 8, 0 out of 2 for breathing.  If no one can work her in she will go to [facility] for the NST.      Leonie Reyes RN

## 2023-12-07 ENCOUNTER — TELEPHONE (OUTPATIENT)
Dept: OBGYN | Facility: CLINIC | Age: 38
End: 2023-12-07

## 2023-12-07 ENCOUNTER — ANCILLARY PROCEDURE (OUTPATIENT)
Dept: ULTRASOUND IMAGING | Facility: CLINIC | Age: 38
End: 2023-12-07
Attending: OBSTETRICS & GYNECOLOGY
Payer: COMMERCIAL

## 2023-12-07 DIAGNOSIS — E11.69 TYPE 2 DIABETES MELLITUS WITH OTHER SPECIFIED COMPLICATION, WITHOUT LONG-TERM CURRENT USE OF INSULIN (H): Primary | ICD-10-CM

## 2023-12-07 DIAGNOSIS — O36.5990 FETAL GROWTH RESTRICTION ANTEPARTUM: ICD-10-CM

## 2023-12-07 DIAGNOSIS — E11.65 TYPE 2 DIABETES MELLITUS WITH HYPERGLYCEMIA, WITHOUT LONG-TERM CURRENT USE OF INSULIN (H): ICD-10-CM

## 2023-12-07 PROCEDURE — 76819 FETAL BIOPHYS PROFIL W/O NST: CPT | Mod: TC | Performed by: RADIOLOGY

## 2023-12-08 NOTE — TELEPHONE ENCOUNTER
I received a page from Dr. Cárdenas in Radiology while on call today in regard to this patient's BPP score of 6/8 (-2 for lack of breathing).  I then tried to call this patient but each time it goes right to her voicemail.  Therefore, I left a message on her voicemail to go to L&D this evening for a NST or call the clinic tomorrow morning for a NST.  I called and gave report to Jeanette charge RN in regard to this plan in case the patient presents to L&D tonight.

## 2023-12-11 ENCOUNTER — ANCILLARY PROCEDURE (OUTPATIENT)
Dept: ULTRASOUND IMAGING | Facility: CLINIC | Age: 38
End: 2023-12-11
Attending: OBSTETRICS & GYNECOLOGY
Payer: COMMERCIAL

## 2023-12-11 DIAGNOSIS — O36.5990 FETAL GROWTH RESTRICTION ANTEPARTUM: ICD-10-CM

## 2023-12-11 DIAGNOSIS — E11.65 TYPE 2 DIABETES MELLITUS WITH HYPERGLYCEMIA, WITHOUT LONG-TERM CURRENT USE OF INSULIN (H): ICD-10-CM

## 2023-12-11 PROCEDURE — 76819 FETAL BIOPHYS PROFIL W/O NST: CPT | Mod: TC | Performed by: RADIOLOGY

## 2023-12-12 ENCOUNTER — MEDICAL CORRESPONDENCE (OUTPATIENT)
Dept: HEALTH INFORMATION MANAGEMENT | Facility: CLINIC | Age: 38
End: 2023-12-12

## 2023-12-13 DIAGNOSIS — E11.65 TYPE 2 DIABETES MELLITUS WITH HYPERGLYCEMIA, WITHOUT LONG-TERM CURRENT USE OF INSULIN (H): ICD-10-CM

## 2023-12-13 DIAGNOSIS — O24.112 PRE-EXISTING TYPE 2 DIABETES MELLITUS DURING PREGNANCY IN SECOND TRIMESTER: ICD-10-CM

## 2023-12-13 RX ORDER — PEN NEEDLE, DIABETIC 32GX 5/32"
NEEDLE, DISPOSABLE MISCELLANEOUS
Qty: 200 EACH | Refills: 1 | Status: SHIPPED | OUTPATIENT
Start: 2023-12-13 | End: 2024-08-21

## 2023-12-13 NOTE — TELEPHONE ENCOUNTER
"Last Written Prescription Date:  8/8/23  Last Fill Quantity: 150,  # refills: 1   Last office visit: 11/29/23  Future Office Visit: 12/27/23  Next 5 appointments (look out 90 days)      Dec 14, 2023  3:45 PM  Return OB Visit with Ulisses Edwards MD  [facility] ([facility] ) [address] [address] [place] [address]  [phone]     Dec 21, 2023 10:15 AM  Return OB Visit with Ulisses Edwards MD  [facility] ([facility] ) [address] [address] [address]  [phone]     Dec 28, 2023  3:45 PM  Return OB Visit with Ulisses Edwards MD  [facility] ([facility] ) [address] [place] [address]  [phone]     Jan 05, 2024  8:00 AM  Return OB Visit with Ulisses Edwards MD  [facility] ([address] [id number] [phone]                 Requested Prescriptions   Pending Prescriptions Disp Refills    BD PEN NEEDLE SAL 2ND GEN 32G X 4 MM miscellaneous [Pharmacy Med Name: B-D SAL 2ND GEN PEN NDL [id number]]       Sig: USE 4 PEN NEEDLES DAILY OR AS DIRECTED       Diabetic Supplies Protocol Passed - 12/13/2023  4:11 AM        Passed - Medication is active on med list        Passed - Patient is 18 years of age or older        Passed - Recent (6 mo) or future (30 days) visit within the authorizing provider's specialty     Patient had office visit in the last 6 months or has a visit in the next 30 days with authorizing provider.  See \"Patient Info\" tab in inbasket, or \"Choose Columns\" in Meds & Orders section of the refill encounter.               Refills sent  Ama Moreno RN    "

## 2023-12-14 ENCOUNTER — PRENATAL OFFICE VISIT (OUTPATIENT)
Dept: OBGYN | Facility: CLINIC | Age: 38
End: 2023-12-14
Payer: COMMERCIAL

## 2023-12-14 VITALS
BODY MASS INDEX: 36.41 KG/M2 | OXYGEN SATURATION: 99 % | HEART RATE: 85 BPM | DIASTOLIC BLOOD PRESSURE: 62 MMHG | WEIGHT: 174.2 LBS | SYSTOLIC BLOOD PRESSURE: 97 MMHG

## 2023-12-14 DIAGNOSIS — O09.523 MULTIGRAVIDA OF ADVANCED MATERNAL AGE IN THIRD TRIMESTER: ICD-10-CM

## 2023-12-14 DIAGNOSIS — E11.65 TYPE 2 DIABETES MELLITUS WITH HYPERGLYCEMIA, WITHOUT LONG-TERM CURRENT USE OF INSULIN (H): Primary | ICD-10-CM

## 2023-12-14 DIAGNOSIS — O36.5990 FETAL GROWTH RESTRICTION ANTEPARTUM: ICD-10-CM

## 2023-12-14 PROCEDURE — 99207 PR PRENATAL VISIT: CPT | Performed by: OBSTETRICS & GYNECOLOGY

## 2023-12-15 ENCOUNTER — ANCILLARY PROCEDURE (OUTPATIENT)
Dept: ULTRASOUND IMAGING | Facility: CLINIC | Age: 38
End: 2023-12-15
Attending: OBSTETRICS & GYNECOLOGY
Payer: COMMERCIAL

## 2023-12-15 DIAGNOSIS — O36.5990 FETAL GROWTH RESTRICTION ANTEPARTUM: ICD-10-CM

## 2023-12-15 DIAGNOSIS — E11.65 TYPE 2 DIABETES MELLITUS WITH HYPERGLYCEMIA, WITHOUT LONG-TERM CURRENT USE OF INSULIN (H): ICD-10-CM

## 2023-12-15 PROCEDURE — 76819 FETAL BIOPHYS PROFIL W/O NST: CPT | Mod: TC | Performed by: RADIOLOGY

## 2023-12-19 ENCOUNTER — ANCILLARY PROCEDURE (OUTPATIENT)
Dept: ULTRASOUND IMAGING | Facility: CLINIC | Age: 38
End: 2023-12-19
Attending: OBSTETRICS & GYNECOLOGY
Payer: COMMERCIAL

## 2023-12-19 DIAGNOSIS — E11.65 TYPE 2 DIABETES MELLITUS WITH HYPERGLYCEMIA, WITHOUT LONG-TERM CURRENT USE OF INSULIN (H): ICD-10-CM

## 2023-12-19 DIAGNOSIS — O36.5990 FETAL GROWTH RESTRICTION ANTEPARTUM: ICD-10-CM

## 2023-12-19 DIAGNOSIS — O09.523 MULTIGRAVIDA OF ADVANCED MATERNAL AGE IN THIRD TRIMESTER: ICD-10-CM

## 2023-12-19 PROCEDURE — 76819 FETAL BIOPHYS PROFIL W/O NST: CPT | Mod: TC | Performed by: RADIOLOGY

## 2023-12-21 ENCOUNTER — PRENATAL OFFICE VISIT (OUTPATIENT)
Dept: OBGYN | Facility: CLINIC | Age: 38
End: 2023-12-21
Payer: COMMERCIAL

## 2023-12-21 ENCOUNTER — ANCILLARY PROCEDURE (OUTPATIENT)
Dept: ULTRASOUND IMAGING | Facility: CLINIC | Age: 38
End: 2023-12-21
Attending: OBSTETRICS & GYNECOLOGY
Payer: COMMERCIAL

## 2023-12-21 VITALS
WEIGHT: 172.1 LBS | HEART RATE: 87 BPM | SYSTOLIC BLOOD PRESSURE: 100 MMHG | BODY MASS INDEX: 35.97 KG/M2 | DIASTOLIC BLOOD PRESSURE: 69 MMHG

## 2023-12-21 DIAGNOSIS — O09.523 MULTIGRAVIDA OF ADVANCED MATERNAL AGE IN THIRD TRIMESTER: ICD-10-CM

## 2023-12-21 DIAGNOSIS — E11.65 TYPE 2 DIABETES MELLITUS WITH HYPERGLYCEMIA, WITHOUT LONG-TERM CURRENT USE OF INSULIN (H): Primary | ICD-10-CM

## 2023-12-21 DIAGNOSIS — E11.65 TYPE 2 DIABETES MELLITUS WITH HYPERGLYCEMIA, WITHOUT LONG-TERM CURRENT USE OF INSULIN (H): ICD-10-CM

## 2023-12-21 DIAGNOSIS — O36.5990 FETAL GROWTH RESTRICTION ANTEPARTUM: ICD-10-CM

## 2023-12-21 PROCEDURE — 76819 FETAL BIOPHYS PROFIL W/O NST: CPT | Mod: TC | Performed by: RADIOLOGY

## 2023-12-21 PROCEDURE — 99207 PR PRENATAL VISIT: CPT | Performed by: OBSTETRICS & GYNECOLOGY

## 2023-12-21 NOTE — PROGRESS NOTES
38w1d    /69 (BP Location: Left arm, Cuff Size: Adult Regular)   Pulse 87   Wt 78.1 kg (172 lb 1.6 oz)   LMP 03/29/2023 (Exact Date)   BMI 35.97 kg/m    Cervix:  4/70/-2  No HA, visual changes, N/V  Labor plan and warning s/s discussed.   Induction for 39 weeks.  She is scheduled for 12/27/2023.  She desires to have the tubal ligation also, if possible.  Ulisses Edwards MD

## 2023-12-22 ENCOUNTER — TELEPHONE (OUTPATIENT)
Dept: OBGYN | Facility: CLINIC | Age: 38
End: 2023-12-22

## 2023-12-22 ENCOUNTER — TELEPHONE (OUTPATIENT)
Dept: MATERNAL FETAL MEDICINE | Facility: CLINIC | Age: 38
End: 2023-12-22

## 2023-12-22 ENCOUNTER — HOSPITAL ENCOUNTER (OUTPATIENT)
Dept: ULTRASOUND IMAGING | Facility: CLINIC | Age: 38
Discharge: HOME | End: 2023-12-22
Attending: OBSTETRICS & GYNECOLOGY
Payer: COMMERCIAL

## 2023-12-22 ENCOUNTER — OFFICE VISIT (OUTPATIENT)
Dept: MATERNAL FETAL MEDICINE | Facility: CLINIC | Age: 38
End: 2023-12-22
Attending: OBSTETRICS & GYNECOLOGY
Payer: COMMERCIAL

## 2023-12-22 ENCOUNTER — TELEPHONE (OUTPATIENT)
Dept: OBGYN | Facility: CLINIC | Age: 38
End: 2023-12-22
Payer: COMMERCIAL

## 2023-12-22 DIAGNOSIS — O24.113 PRE-EXISTING TYPE 2 DIABETES MELLITUS DURING PREGNANCY IN THIRD TRIMESTER: Primary | ICD-10-CM

## 2023-12-22 DIAGNOSIS — O24.113 PRE-EXISTING TYPE 2 DIABETES MELLITUS DURING PREGNANCY IN THIRD TRIMESTER: ICD-10-CM

## 2023-12-22 PROCEDURE — 76818 FETAL BIOPHYS PROFILE W/NST: CPT | Mod: 26 | Performed by: OBSTETRICS & GYNECOLOGY

## 2023-12-22 PROCEDURE — 99214 OFFICE O/P EST MOD 30 MIN: CPT | Mod: 25 | Performed by: OBSTETRICS & GYNECOLOGY

## 2023-12-22 PROCEDURE — 76816 OB US FOLLOW-UP PER FETUS: CPT | Mod: 26 | Performed by: OBSTETRICS & GYNECOLOGY

## 2023-12-22 PROCEDURE — 76816 OB US FOLLOW-UP PER FETUS: CPT

## 2023-12-22 NOTE — NURSING NOTE
Becky is here for growth ultrasound today at [facility]. Patient reports positive fetal movement, c/o of some lower back pain now, denies contractions, leaking of fluid, or bleeding.  Reports blood sugar values fasting and hr post prandial wnl. Insulin dosages reviewed. Patient states plan for IOL on 12/27 unless other recommendation from [facility]. Education provided to patient on today's ultrasound.  SBAR given to [facility] MD, see their note in Epic.NST Performed due to BPP 6/8.   reviewed efm tracing. See NST/BPP Doc Flowsheet tab. Patient BG on dexcom is 54 while talking with MD, given juice and granola bar and had her stay in recliner until feeling better. Blood sugar went up to 72 after snack, Becky states she feels fine and will have lunch when she gets to work. Stable and ambulating with out difficulty on her way out.

## 2023-12-22 NOTE — TELEPHONE ENCOUNTER
-Patient calling back to inform Dr. Peña she now agrees to do induction.  -Dr. Peña paged and informed.  -Called TERESITA Phillips charge nurse at [facility], Jacqueline would like patient to call her (Jacqueline OR Charge nurse that is working) at 5pm to get further instructions on when induction can be done.    - This writer called Becky back informed patient to call [facility] at [phone] at 5pm ask for TERESITA Phillips or the Charge Nurse re: induction.    La ELDRIDGE RN

## 2023-12-22 NOTE — TELEPHONE ENCOUNTER
[facility] Call Center    Phone Message    May a detailed message be left on voicemail: no     Reason for Call: Other: Keira from [facility] was calling to speak with a nurse or Dr. Edwards regarding this pt. Please call her back at [phone].     Action Taken: Message routed to:  Other: [facility]    Travel Screening: Not Applicable

## 2023-12-22 NOTE — TELEPHONE ENCOUNTER
38w2d        Called Keira at [facility] back.  Keira states Dr Santos would like to speak with provider about pt's blood sugar levels and possible induction.  Dr. CASTILLO can be reached at [phone].  On call provider paged, Dr. Peña given info above.

## 2023-12-22 NOTE — TELEPHONE ENCOUNTER
I received a page from the RN stating [facility] would like to talk to me regarding this patient.  I called and spoke to Dr. Santos regarding this patient.  It is recommended that patient be induced given her abnormal blood sugar levels.  I spoke with Dr. Edwards regarding this patient as he has seen this patient multiple times.  I called and spoke with patient.  We discussed the need for an induction and the risks of staying pregnant to the baby.  She states she cannot be induced due to a family gathering tomorrow.  I again discussed the risks asso with abnormal blood sugar control.  She will speak with her  and call me back soon.  Labor and Delivery is currently at capacity however would be able to accommodate an induction later this afternoon/evening most likely per the charge RN.  All questions answered and patient verbalizes understanding.    Aylin Peña, DO

## 2023-12-22 NOTE — TELEPHONE ENCOUNTER
Patient called to ask about future endocrinology appointments. Patient states no appointments until the end of the month scheduled (12/28). Patient informed that Dr. Santos recommends IOL today based on low blood sugars today in clinic and unknown control since last endocrine appointment. Patient states she has an event and does not want IOL today or tomorrow. Informed patient that [facility] would contact her primary OB provider and have them reach out to her to schedule induction. Patient verbalized understanding.

## 2023-12-27 ENCOUNTER — TELEPHONE (OUTPATIENT)
Dept: OBGYN | Facility: CLINIC | Age: 38
End: 2023-12-27

## 2023-12-27 NOTE — TELEPHONE ENCOUNTER
[facility] form sent asking for delivery date and start of leave. Delivered 12/24/23  Form filled out and faxed back.  TERE Sales 12/27/2023       Copy kept at [facility]

## 2023-12-28 ENCOUNTER — TRANSCRIBE ORDERS (OUTPATIENT)
Dept: OTHER | Age: 38
End: 2023-12-28

## 2023-12-28 NOTE — PROGRESS NOTES
37w1d    BP 97/62 (BP Location: Right arm, Cuff Size: Adult Regular)   Pulse 85   Wt 79 kg (174 lb 3.2 oz)   LMP 03/29/2023 (Exact Date)   SpO2 99%   BMI 36.41 kg/m    Tired.  No HA, visual changes, N/V   Labor plan and warning s/s discussed.   GBS testing negative  RTC 1 wk  Ulisses Edwards MD

## 2024-02-08 ENCOUNTER — PRENATAL OFFICE VISIT (OUTPATIENT)
Dept: OBGYN | Facility: CLINIC | Age: 39
End: 2024-02-08
Payer: COMMERCIAL

## 2024-02-08 VITALS
SYSTOLIC BLOOD PRESSURE: 106 MMHG | DIASTOLIC BLOOD PRESSURE: 74 MMHG | HEART RATE: 87 BPM | BODY MASS INDEX: 29.78 KG/M2 | OXYGEN SATURATION: 96 % | WEIGHT: 142.5 LBS

## 2024-02-08 PROCEDURE — 99207 PR POST PARTUM EXAM: CPT | Performed by: OBSTETRICS & GYNECOLOGY

## 2024-02-08 ASSESSMENT — PATIENT HEALTH QUESTIONNAIRE - PHQ9: SUM OF ALL RESPONSES TO PHQ QUESTIONS 1-9: 2

## 2024-02-08 NOTE — PATIENT INSTRUCTIONS
Week 38 of Your Pregnancy: Care Instructions  Believe it or not, your baby is almost here. You may notice how your baby responds to sounds, warmth, cold, and light. You may even know what kind of music your baby likes.    Even if you expect a vaginal birth, it's a good idea to learn about  section ().  is the delivery of a baby through a cut (incision) in your belly. It's a major surgery, so it has more risks than a vaginal birth.   During the first 2 weeks after the birth, limit visitors. Don't allow visitors who have colds or infections. Ask visitors to wash their hands before they touch your baby. And never let anyone smoke around your baby.     Know about unplanned C-sections.  Reasons for an unplanned  include labor that slows or stops, signs of distress in your baby, and high blood pressure or other problems for you.     Know about planned C-sections.  If your baby isn't in a head-down position for delivery (breech position), your doctor may plan a . Or you may have a planned  if you're having twins or more.     Get as much help as you can while you're in the hospital.  You can learn about feeding, diapering, and bathing your baby.     Talk to your doctor or midwife about how to care for the umbilical cord stump.  If your baby will be circumcised, also ask about how to care for that.     Ask friends or family for help, as you need it.  If you can, have another adult in your home for at least 2 or 3 days after the birth.     Try to nap when your baby naps.  This may be your best chance to get some sleep.     Watch for changes in your mental health.  For the first 1 to 2 weeks after birth, it's common to cry or feel sad or irritable. If these feelings last for more than 2 weeks, you may have postpartum depression. Ask your doctor for help. Postpartum depression can be treated.   Follow-up care is a key part of your treatment and safety. Be sure to make and go  "to all appointments, and call your doctor if you are having problems. It's also a good idea to know your test results and keep a list of the medicines you take.  Where can you learn more?  Go to [email or web address]  Enter B044 in the search box to learn more about \"Week 38 of Your Pregnancy: Care Instructions.\"  Current as of: July 11, 2023               Content Version: 13.8    4162-0782 [employer].   Care instructions adapted under license by your healthcare professional. If you have questions about a medical condition or this instruction, always ask your healthcare professional. [employer] disclaims any warranty or liability for your use of this information.                                                        If you have any questions regarding your visit, Please contact your care team.    To Schedule an Appointment 24/7  Call: [phone]  TELEPHONE NUMBER   DEMOND Jansen-Medical Assistant    Davian Hadley-TERESITA Serra-Surgery Scheduler  Gabrielle- Tuesday-[place]                   7:30 a.m.-3:30 p.m.  Wednesday-[place]             8:00 a.m.-4:30 p.m.  Thursday-[place]     8:00 a.m.-4:00 p.m.  Friday-[place]                       7:30 a.m.-1:00 p.m. [facility]  [address] [place] [address]  [phone] Phone  [phone] Fax    Imaging Scheduling all locations  [phone]      [facility] Labor and Delivery  [address] [place] [address]  [phone]    [facility]  [address]  [place] [id number] [phone] ask for Women's Clinic     **Surgeries** Our Surgery Schedulers will contact you to schedule. If you do not receive a call within 3 business days, please call [phone].    Urgent Care locations:  [facility] Monday-Friday                 10 am - 8 pm  Saturday and Sunday        9 am - 5 pm   [phone] " [phone]   If you need a medication refill, please contact your pharmacy. Please allow 3 business days for your refill to be completed.  As always, Thank you for trusting us with your healthcare needs!  If you have any questions regarding your visit, Please contact your care team.    [employer]: [phone]    To Schedule an Appointment 24/7  Call: [phone]    see additional instructions from your care team below

## 2024-02-08 NOTE — PROGRESS NOTES
POSTPARTUM VISIT:    Delivery Information:    Date:  12/24/2023  Route:  vaginal delivery   Sex:  female     Weight:  3300 g      Apgars:  8/9  Reviewed pregnancy and birth.  Doing well.  No significant symptoms.  Infant doing fine.  Breast feeding:  no  Bottle:  yes   Formula:  yes    Exam:  /74 (BP Location: Right arm, Patient Position: Sitting, Cuff Size: Adult Regular)   Pulse 87   Wt 64.6 kg (142 lb 8 oz)   LMP 03/29/2023 (Exact Date)   SpO2 96%   Breastfeeding No   BMI 29.78 kg/m    PHQ-9 = 2  General:  WNWD female, NAD  Alert  Oriented x 3  Gait:  Normal  Skin:  Normal skin turgor  HEENT:  NC/AT, EOMI  Abdomen:  Non-tender, non-distended.  Incision has healed well.    Pelvic exam:  not performed today.     Reviewed contraception plans.  She had the post partum tubal ligation   Pap smear due 2028  Follow up with the Diabetic team.   Continue general medical care.

## 2024-03-23 ENCOUNTER — OFFICE VISIT (OUTPATIENT)
Dept: URGENT CARE | Facility: URGENT CARE | Age: 39
End: 2024-03-23
Payer: COMMERCIAL

## 2024-03-23 VITALS
OXYGEN SATURATION: 98 % | DIASTOLIC BLOOD PRESSURE: 81 MMHG | RESPIRATION RATE: 16 BRPM | BODY MASS INDEX: 30.87 KG/M2 | SYSTOLIC BLOOD PRESSURE: 114 MMHG | WEIGHT: 147.7 LBS | TEMPERATURE: 97.7 F | HEART RATE: 84 BPM

## 2024-03-23 DIAGNOSIS — R30.0 DYSURIA: ICD-10-CM

## 2024-03-23 DIAGNOSIS — N39.0 ACUTE UTI (URINARY TRACT INFECTION): Primary | ICD-10-CM

## 2024-03-23 DIAGNOSIS — O24.112 PRE-EXISTING TYPE 2 DIABETES MELLITUS DURING PREGNANCY IN SECOND TRIMESTER: ICD-10-CM

## 2024-03-23 LAB
ALBUMIN UR-MCNC: 100 MG/DL
APPEARANCE UR: ABNORMAL
BACTERIA #/AREA URNS HPF: ABNORMAL /HPF
BILIRUB UR QL STRIP: NEGATIVE
CLUE CELLS: ABNORMAL
COLOR UR AUTO: YELLOW
GLUCOSE UR STRIP-MCNC: NEGATIVE MG/DL
HBA1C MFR BLD: 6.1 % (ref 0–5.6)
HGB UR QL STRIP: ABNORMAL
KETONES UR STRIP-MCNC: NEGATIVE MG/DL
LEUKOCYTE ESTERASE UR QL STRIP: ABNORMAL
NITRATE UR QL: NEGATIVE
PH UR STRIP: 6 [PH] (ref 5–7)
RBC #/AREA URNS AUTO: ABNORMAL /HPF
SP GR UR STRIP: >=1.03 (ref 1–1.03)
SQUAMOUS #/AREA URNS AUTO: ABNORMAL /LPF
TRICHOMONAS, WET PREP: ABNORMAL
UROBILINOGEN UR STRIP-ACNC: 0.2 E.U./DL
WBC #/AREA URNS AUTO: >100 /HPF
WBC'S/HIGH POWER FIELD, WET PREP: ABNORMAL
YEAST, WET PREP: ABNORMAL

## 2024-03-23 PROCEDURE — 87086 URINE CULTURE/COLONY COUNT: CPT | Performed by: FAMILY MEDICINE

## 2024-03-23 PROCEDURE — 36415 COLL VENOUS BLD VENIPUNCTURE: CPT | Performed by: FAMILY MEDICINE

## 2024-03-23 PROCEDURE — 83036 HEMOGLOBIN GLYCOSYLATED A1C: CPT | Performed by: FAMILY MEDICINE

## 2024-03-23 PROCEDURE — 87210 SMEAR WET MOUNT SALINE/INK: CPT | Performed by: FAMILY MEDICINE

## 2024-03-23 PROCEDURE — 87186 SC STD MICRODIL/AGAR DIL: CPT | Performed by: FAMILY MEDICINE

## 2024-03-23 PROCEDURE — 81001 URINALYSIS AUTO W/SCOPE: CPT | Performed by: FAMILY MEDICINE

## 2024-03-23 PROCEDURE — 99214 OFFICE O/P EST MOD 30 MIN: CPT | Mod: 24 | Performed by: FAMILY MEDICINE

## 2024-03-23 RX ORDER — NITROFURANTOIN 25; 75 MG/1; MG/1
100 CAPSULE ORAL 2 TIMES DAILY
Qty: 14 CAPSULE | Refills: 0 | Status: SHIPPED | OUTPATIENT
Start: 2024-03-23 | End: 2024-03-30

## 2024-03-23 NOTE — PROGRESS NOTES
Assessment & Plan     Becky was seen today for uti.    Diagnoses and all orders for this visit:    Acute UTI (urinary tract infection)  -     Treat empirically as we await urine culture: nitroFURantoin macrocrystal-monohydrate (MACROBID) 100 MG capsule; Take 1 capsule (100 mg) by mouth 2 times daily for 7 days    Dysuria  -     UA Macroscopic with reflex to Microscopic and Culture - Lab Collect; Future  -     Wet prep - lab collect; Future  -     UA Macroscopic with reflex to Microscopic and Culture - Lab Collect  -     Wet prep - lab collect  -     Urine Microscopic Exam  -     Urine Culture    Pre-existing type 2 diabetes mellitus during pregnancy in second trimester  -     Hemoglobin A1c        Patient education and Handout with home care instructions given. See AVS for details.      Return in about 1 week (around 3/30/2024), or if symptoms worsen or fail to improve.          Subjective   Becky is a 39 year old, presenting for the following health issues:  UTI (Burning and pain with urination for 1 week. )    HPI       URINARY TRACT SYMPTOMS  Onset: last week  Description:   Painful urination (Dysuria): YES           Frequency: YES  Blood in urine (Hematuria): no   Delay in urine (Hesitency): no   Intensity: moderate  Progression of Symptoms:  worsening  Accompanying Signs & Symptoms:  Fever/chills: no   Flank pain no   Nausea and vomiting: no   Any vaginal symptoms: none  Abdominal/Pelvic Pain: no   History:   History of frequent UTI's: no   History of kidney stones: no   Sexually Active: YES  Possibility of pregnancy: No  Precipitating factors:   unknown    Therapies Tried and outcome: Increase fluid intake    History of Type 2 diabetes. Diet controlled. Due for A1c- orders on file. Will release.   Has a baby 2 months ago. Not breastfeeding.       Review of Systems  Constitutional, HEENT, cardiovascular, pulmonary, gi and gu systems are negative, except as otherwise noted.      Objective    /81 (BP  Location: Left arm, Patient Position: Sitting, Cuff Size: Adult Regular)   Pulse 84   Temp 97.7  F (36.5  C) (Tympanic)   Resp 16   Wt 67 kg (147 lb 11.2 oz)   LMP 03/11/2024   SpO2 98%   BMI 30.87 kg/m    Body mass index is 30.87 kg/m .  Physical Exam   GENERAL: alert and no distress  ABDOMEN: soft, nontender, no CVA tenderness.   PSYCH: mentation appears normal, affect normal/bright    DATA  Reviewed and discussed with patient prior to discharge.  Results for orders placed or performed in visit on 03/23/24   Hemoglobin A1c     Status: Abnormal   Result Value Ref Range    Hemoglobin A1C 6.1 (H) 0.0 - 5.6 %   UA Macroscopic with reflex to Microscopic and Culture - Lab Collect     Status: Abnormal    Specimen: Urine, Midstream   Result Value Ref Range    Color Urine Yellow Colorless, Straw, Light Yellow, Yellow    Appearance Urine Cloudy (A) Clear    Glucose Urine Negative Negative mg/dL    Bilirubin Urine Negative Negative    Ketones Urine Negative Negative mg/dL    Specific Gravity Urine >=1.030 1.003 - 1.035    Blood Urine Small (A) Negative    pH Urine 6.0 5.0 - 7.0    Protein Albumin Urine 100 (A) Negative mg/dL    Urobilinogen Urine 0.2 0.2, 1.0 E.U./dL    Nitrite Urine Negative Negative    Leukocyte Esterase Urine Large (A) Negative   Urine Microscopic Exam     Status: Abnormal   Result Value Ref Range    Bacteria Urine Moderate (A) None Seen /HPF    RBC Urine 2-5 (A) 0-2 /HPF /HPF    WBC Urine >100 (A) 0-5 /HPF /HPF    Squamous Epithelials Urine Moderate (A) None Seen /LPF   Wet prep - lab collect     Status: Abnormal    Specimen: Vagina; Swab   Result Value Ref Range    Trichomonas Absent Absent    Yeast Absent Absent    Clue Cells Absent Absent    WBCs/high power field 3+ (A) None             Signed Electronically by: Kami Stanley MD

## 2024-03-25 LAB
BACTERIA UR CULT: ABNORMAL
BACTERIA UR CULT: ABNORMAL

## 2024-06-22 ENCOUNTER — HEALTH MAINTENANCE LETTER (OUTPATIENT)
Age: 39
End: 2024-06-22

## 2024-08-21 ENCOUNTER — VIRTUAL VISIT (OUTPATIENT)
Dept: FAMILY MEDICINE | Facility: CLINIC | Age: 39
End: 2024-08-21
Payer: COMMERCIAL

## 2024-08-21 ENCOUNTER — LAB (OUTPATIENT)
Dept: LAB | Facility: CLINIC | Age: 39
End: 2024-08-21
Payer: COMMERCIAL

## 2024-08-21 DIAGNOSIS — N89.8 VAGINAL ITCHING: Primary | ICD-10-CM

## 2024-08-21 DIAGNOSIS — N89.8 VAGINAL ITCHING: ICD-10-CM

## 2024-08-21 DIAGNOSIS — N89.8 VAGINAL DISCHARGE: ICD-10-CM

## 2024-08-21 LAB
CLUE CELLS: NORMAL
TRICHOMONAS, WET PREP: NORMAL
WBC'S/HIGH POWER FIELD, WET PREP: NORMAL
YEAST, WET PREP: NORMAL

## 2024-08-21 PROCEDURE — 87210 SMEAR WET MOUNT SALINE/INK: CPT

## 2024-08-21 PROCEDURE — 99441 PR PHYSICIAN TELEPHONE EVALUATION 5-10 MIN: CPT | Mod: 93 | Performed by: INTERNAL MEDICINE

## 2024-08-21 RX ORDER — MICONAZOLE NITRATE 2 %
1 CREAM WITH APPLICATOR VAGINAL AT BEDTIME
Qty: 0.7 G | Refills: 0 | Status: SHIPPED | OUTPATIENT
Start: 2024-08-21

## 2024-08-21 NOTE — PROGRESS NOTES
If patient has telephone visit, have they been educated on video visit as preferred visit method and offered to change to video visit? YES        Instructions Relayed to Patient by Virtual Roomer:     Patient instructed that provider will initiate telephone visit via phone call      Patient Confirmed they will join visit via: Provider to call patient for telephone visit   Reminded patient to ensure they were logged on to virtual visit by arrival time listed.   Asked if patient has flexibility to initiate visit sooner than arrival time: patient is unable to initiate visit earlier than arrival time     If pediatric virtual visit, ensured pediatric patient along with parent/guardian will be present for video visit.     Patient offered the website [email or web address] and/or phone number to [employer] Help line: [phone]      Becky is a 39 year old who is being evaluated via a billable telephone visit.    What phone number would you like to be contacted at? [phone]   How would you like to obtain your AVS? [employer]  Originating Location (pt. Location): Home    Distant Location (provider location):  On-site    Assessment & Plan     Becky was seen today for vaginal problem.    Diagnoses and all orders for this visit:    Vaginal itching  -     Wet prep - lab collect; Future    Other orders  -     REVIEW OF HEALTH MAINTENANCE PROTOCOL ORDERS      Further recommendation pending on blood prep test resolved.    Subjective   Becky is a 39 year old, presenting for the following health issues:  Vaginal Problem (Started Last week, possible yeast infection )        8/21/2024     8:37 AM   Additional Questions   Roomed by Laly CAMERON   Accompanied by Self     History of Present Illness       Reason for visit:  Possible Yeast infection  Symptom onset:  3-7 days ago  Symptoms include:  Itching and burning sensation in vaginal area  Symptom intensity:  Moderate  Symptom progression:  Worsening  Had these symptoms  before:  Yes  Has tried/received treatment for these symptoms:  Yes  Previous treatment was successful:  Yes  Prior treatment description:  Unsure  What makes it worse:  None  What makes it better:  Pt tried OTC Vagisil with no improvement and worsened burning sensation   She is taking medications regularly.     Vaginal Symptoms  Onset/Duration: Started last week   Description:  Vaginal Discharge: No discharge - dry    Itching (Pruritis): YES  Burning sensation:  YES  Odor: No  Accompanying Signs & Symptoms:  Urinary symptoms: No  Abdominal pain: No  Fever: No  History:   Sexually active: YES  New Partner: No  Possibility of Pregnancy:  No  Recent antibiotic use: No  Previous vaginitis issues: YES- has had similar issues when pregnant   Precipitating or alleviating factors: None  Therapies tried and outcome: Vagisil - pt states this OTC treatment was not helpful and increased burning sensation. Last used 2 days ago. One time.     No recent antibiotic use.   No vaginal discharge.   Feels itching inside, dry.   Painful intercourse.    Similar symptom during pregnancy. Her Ob gave her topical itch ointment and intravaginal itch cream. She doesn't remember the name.     Review of Systems  Constitutional, HEENT, cardiovascular, pulmonary, gi and gu systems are negative, except as otherwise noted.      Objective           Vitals:  No vitals were obtained today due to virtual visit.    Physical Exam   General: Alert and no distress //Respiratory: No audible wheeze, cough, or shortness of breath // Psychiatric:  Appropriate affect, tone, and pace of words            Phone call duration: 7 minutes  Signed Electronically by: Sanaz Montenegro MD PhD

## 2024-11-09 ENCOUNTER — HEALTH MAINTENANCE LETTER (OUTPATIENT)
Age: 39
End: 2024-11-09

## 2025-03-02 ENCOUNTER — HEALTH MAINTENANCE LETTER (OUTPATIENT)
Age: 40
End: 2025-03-02

## 2025-05-10 ENCOUNTER — HEALTH MAINTENANCE LETTER (OUTPATIENT)
Age: 40
End: 2025-05-10

## 2025-07-12 ENCOUNTER — HEALTH MAINTENANCE LETTER (OUTPATIENT)
Age: 40
End: 2025-07-12